# Patient Record
Sex: MALE | Race: WHITE | NOT HISPANIC OR LATINO | Employment: FULL TIME | URBAN - METROPOLITAN AREA
[De-identification: names, ages, dates, MRNs, and addresses within clinical notes are randomized per-mention and may not be internally consistent; named-entity substitution may affect disease eponyms.]

---

## 2024-04-22 ENCOUNTER — TRANSCRIBE ORDERS (OUTPATIENT)
Dept: ADMINISTRATIVE | Facility: HOSPITAL | Age: 54
End: 2024-04-22
Payer: COMMERCIAL

## 2024-04-22 DIAGNOSIS — E78.2 MIXED HYPERLIPIDEMIA: Primary | ICD-10-CM

## 2024-06-04 ENCOUNTER — HOSPITAL ENCOUNTER (OUTPATIENT)
Dept: CT IMAGING | Facility: HOSPITAL | Age: 54
Discharge: HOME | End: 2024-06-04

## 2024-06-04 DIAGNOSIS — E78.2 MIXED HYPERLIPIDEMIA: ICD-10-CM

## 2024-06-04 PROCEDURE — 75571 CT HRT W/O DYE W/CA TEST: CPT

## 2024-06-22 NOTE — PROGRESS NOTES
Cardiology Office New Patient Visit      Primary Care Provider:  Provider, No Known    Reason for Visit:     Eval Abnormal Coronary Calcium Scan      Subjective       History of Present Illness       Heladio Clark is a 54 y.o. male seen in cardiology clinic today for evaluation of abnormal coronary calcium scan.    Coronary calcium scan 2024 showed a total score 473.7: 281.7 LAD, 139.2 Lcx, and 52.8 RCA.  He is also noted with a 4.4 cm mid ascending thoracic aneurysm.  Recents labs 2024 as follows: Na 139, K 4.1, Cr 1.04, LFTs WNL, Hgb 14.4, Plt 296.  FLP: , , HDL 40, .    Patient tells me that coronary calcium scan testing was prompted by elevated lipids.  He denies any chest pain, shortness of breath, or dizziness.  He is active and has been able to perform activities such as walking the golf course recently.  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  Patient is unaware of any family history of aortic aneurysm or genetic conditions such as Marfan syndrome, Duke's, or Erler's Danlos.  His father did have an MI in his 40s with PCI and  of cancer at age 50.      ASSESSMENT/PLAN:        Diagnoses and all orders for this visit:    1. Coronary artery disease involving native coronary artery of native heart without angina pectoris (Primary)  -     Stress Test With Myocardial Perfusion One Day; Future  -     ECG 12 Lead    2. Aneurysm of ascending aorta without rupture  -     Adult Transthoracic Echo Complete W/ Cont if Necessary Per Protocol; Future    3. Heart murmur  -     Adult Transthoracic Echo Complete W/ Cont if Necessary Per Protocol; Future    4. Mixed hyperlipidemia    Other orders  -     atorvastatin (LIPITOR) 40 MG tablet; Take 1 tablet by mouth Daily.  Dispense: 30 tablet; Refill: 11        MEDICAL DECISION MAKING:    Records reviewed as above.  Patient's coronary calcium score is high, in which he has a high risk of  "cardiac events in the next 5 years.  He is currently asymptomatic and without ischemic EKG changes.  Will proceed with exercise nuclear stress test.  He also has an ascending aortic aneurysm with a murmur on exam.  Will check a 2D echo to further outline structural anatomy, specifically of the AV.  He has already been started on aspirin therapy.  Will change his Zocor to atorvastatin to achieve high-dose potency.      RTC in 3 months.    Past Medical History:   Diagnosis Date    Hyperlipidemia        Past Surgical History:   Procedure Laterality Date    TONSILLECTOMY           Current Outpatient Medications:     aspirin 81 MG EC tablet, Take 1 tablet by mouth Daily., Disp: , Rfl:     atorvastatin (LIPITOR) 40 MG tablet, Take 1 tablet by mouth Daily., Disp: 30 tablet, Rfl: 11    Social History     Socioeconomic History    Marital status: Single   Tobacco Use    Smoking status: Never    Smokeless tobacco: Never   Vaping Use    Vaping status: Never Used   Substance and Sexual Activity    Alcohol use: Never    Drug use: Never    Sexual activity: Defer     Never smoked.  No illicit drug use.  Occasional alcohol consumption.    Family History   Problem Relation Age of Onset    Heart disease Father      Patient is unaware of any family history of aortic aneurysm or genetic conditions such as Marfan syndrome, Duke's, or Erler's Danlos.  His father did have an MI in his 40s with PCI and  of cancer at age 50.    The following portions of the patient's history were reviewed and updated as appropriate: allergies, current medications, past family history, past medical history, past social history, past surgical history and problem list.    ROS    /79   Pulse 60   Ht 175.3 cm (69\")   Wt 105 kg (232 lb)   SpO2 95%   BMI 34.26 kg/m² .    Body mass index is 34.26 kg/m².    Objective     Physical Exam    General: Well-developed in NAD.  Neuro: AAOx3. No gross deficits.  HEENT: no xanthelasmas, no carotid bruit " bilaterally.  CV: S1S2 RRR. 2/6 systolic murmurs loudest in aortic area.  Resp: Breathing is unlabored. Lungs CTA throughout.  GI: BS+. Abdomen soft and NTTP.  Ext: Pedal pulses are palpable. Extremities are nonedematous.  MS: moves all extremities, no weakness.  Skin: warm, dry.  Psych: calm and cooperative.    Procedures    EKG shows normal sinus rhythm.  No prior EKGs are available for comparison.

## 2024-06-24 ENCOUNTER — OFFICE VISIT (OUTPATIENT)
Dept: CARDIOLOGY | Facility: CLINIC | Age: 54
End: 2024-06-24
Payer: COMMERCIAL

## 2024-06-24 VITALS
DIASTOLIC BLOOD PRESSURE: 79 MMHG | HEART RATE: 60 BPM | WEIGHT: 232 LBS | HEIGHT: 69 IN | OXYGEN SATURATION: 95 % | SYSTOLIC BLOOD PRESSURE: 132 MMHG | BODY MASS INDEX: 34.36 KG/M2

## 2024-06-24 DIAGNOSIS — I71.21 ANEURYSM OF ASCENDING AORTA WITHOUT RUPTURE: ICD-10-CM

## 2024-06-24 DIAGNOSIS — E78.2 MIXED HYPERLIPIDEMIA: ICD-10-CM

## 2024-06-24 DIAGNOSIS — I25.10 CORONARY ARTERY DISEASE INVOLVING NATIVE CORONARY ARTERY OF NATIVE HEART WITHOUT ANGINA PECTORIS: Primary | ICD-10-CM

## 2024-06-24 DIAGNOSIS — R01.1 HEART MURMUR: ICD-10-CM

## 2024-06-24 PROBLEM — E78.5 HLD (HYPERLIPIDEMIA): Status: ACTIVE | Noted: 2024-06-24

## 2024-06-24 PROCEDURE — 93000 ELECTROCARDIOGRAM COMPLETE: CPT | Performed by: NURSE PRACTITIONER

## 2024-06-24 PROCEDURE — 99214 OFFICE O/P EST MOD 30 MIN: CPT | Performed by: NURSE PRACTITIONER

## 2024-06-24 RX ORDER — ASPIRIN 81 MG/1
81 TABLET ORAL DAILY
COMMUNITY

## 2024-06-24 RX ORDER — SIMVASTATIN 40 MG
80 TABLET ORAL DAILY
COMMUNITY
Start: 2024-04-19 | End: 2024-06-24

## 2024-06-24 RX ORDER — ATORVASTATIN CALCIUM 40 MG/1
40 TABLET, FILM COATED ORAL DAILY
Qty: 30 TABLET | Refills: 11 | Status: SHIPPED | OUTPATIENT
Start: 2024-06-24

## 2024-07-11 ENCOUNTER — HOSPITAL ENCOUNTER (OUTPATIENT)
Dept: CARDIOLOGY | Facility: HOSPITAL | Age: 54
Discharge: HOME | End: 2024-07-11
Payer: COMMERCIAL

## 2024-07-11 ENCOUNTER — HOSPITAL ENCOUNTER (OUTPATIENT)
Dept: CARDIOLOGY | Facility: HOSPITAL | Age: 54
Discharge: HOME | End: 2024-07-11
Admitting: NURSE PRACTITIONER
Payer: COMMERCIAL

## 2024-07-11 VITALS
DIASTOLIC BLOOD PRESSURE: 79 MMHG | SYSTOLIC BLOOD PRESSURE: 132 MMHG | WEIGHT: 232 LBS | BODY MASS INDEX: 34.36 KG/M2 | HEIGHT: 69 IN

## 2024-07-11 DIAGNOSIS — R01.1 HEART MURMUR: ICD-10-CM

## 2024-07-11 DIAGNOSIS — I71.21 ANEURYSM OF ASCENDING AORTA WITHOUT RUPTURE: ICD-10-CM

## 2024-07-11 DIAGNOSIS — I25.10 CORONARY ARTERY DISEASE INVOLVING NATIVE CORONARY ARTERY OF NATIVE HEART WITHOUT ANGINA PECTORIS: ICD-10-CM

## 2024-07-11 LAB
BH CV ECHO LEFT VENTRICLE GLOBAL LONGITUDINAL STRAIN: 17.6 %
BH CV ECHO MEAS - ACS: 1.76 CM
BH CV ECHO MEAS - AI P1/2T: 849.6 MSEC
BH CV ECHO MEAS - AO MAX PG: 8.7 MMHG
BH CV ECHO MEAS - AO MEAN PG: 4.5 MMHG
BH CV ECHO MEAS - AO ROOT DIAM: 3.3 CM
BH CV ECHO MEAS - AO V2 MAX: 147.8 CM/SEC
BH CV ECHO MEAS - AO V2 VTI: 36.8 CM
BH CV ECHO MEAS - AVA(I,D): 2.07 CM2
BH CV ECHO MEAS - EDV(CUBED): 65 ML
BH CV ECHO MEAS - EDV(MOD-SP4): 95.5 ML
BH CV ECHO MEAS - EF(MOD-BP): 59 %
BH CV ECHO MEAS - EF(MOD-SP4): 59.3 %
BH CV ECHO MEAS - ESV(CUBED): 24.1 ML
BH CV ECHO MEAS - ESV(MOD-SP4): 38.8 ML
BH CV ECHO MEAS - FS: 28.2 %
BH CV ECHO MEAS - IVS/LVPW: 0.9 CM
BH CV ECHO MEAS - IVSD: 1.16 CM
BH CV ECHO MEAS - LA DIMENSION: 4.4 CM
BH CV ECHO MEAS - LV DIASTOLIC VOL/BSA (35-75): 43.4 CM2
BH CV ECHO MEAS - LV MASS(C)D: 170.6 GRAMS
BH CV ECHO MEAS - LV MAX PG: 3.8 MMHG
BH CV ECHO MEAS - LV MEAN PG: 2.5 MMHG
BH CV ECHO MEAS - LV SYSTOLIC VOL/BSA (12-30): 17.7 CM2
BH CV ECHO MEAS - LV V1 MAX: 97.7 CM/SEC
BH CV ECHO MEAS - LV V1 VTI: 24.8 CM
BH CV ECHO MEAS - LVIDD: 4 CM
BH CV ECHO MEAS - LVIDS: 2.9 CM
BH CV ECHO MEAS - LVOT AREA: 3.1 CM2
BH CV ECHO MEAS - LVOT DIAM: 1.97 CM
BH CV ECHO MEAS - LVPWD: 1.28 CM
BH CV ECHO MEAS - MR MAX PG: 23.3 MMHG
BH CV ECHO MEAS - MR MAX VEL: 241.5 CM/SEC
BH CV ECHO MEAS - MV A MAX VEL: 59.4 CM/SEC
BH CV ECHO MEAS - MV DEC SLOPE: 454.8 CM/SEC2
BH CV ECHO MEAS - MV DEC TIME: 0.19 SEC
BH CV ECHO MEAS - MV E MAX VEL: 88 CM/SEC
BH CV ECHO MEAS - MV E/A: 1.48
BH CV ECHO MEAS - MV MAX PG: 3.9 MMHG
BH CV ECHO MEAS - MV MEAN PG: 1.13 MMHG
BH CV ECHO MEAS - MV V2 VTI: 33.6 CM
BH CV ECHO MEAS - MVA(VTI): 2.26 CM2
BH CV ECHO MEAS - PA ACC TIME: 0.17 SEC
BH CV ECHO MEAS - PA V2 MAX: 93.7 CM/SEC
BH CV ECHO MEAS - PULM A REVS DUR: 0.15 SEC
BH CV ECHO MEAS - PULM A REVS VEL: 24.9 CM/SEC
BH CV ECHO MEAS - PULM DIAS VEL: 43 CM/SEC
BH CV ECHO MEAS - PULM S/D: 1.27
BH CV ECHO MEAS - PULM SYS VEL: 54.8 CM/SEC
BH CV ECHO MEAS - RAP SYSTOLE: 3 MMHG
BH CV ECHO MEAS - RV MAX PG: 2.42 MMHG
BH CV ECHO MEAS - RV V1 MAX: 77.8 CM/SEC
BH CV ECHO MEAS - RV V1 VTI: 17.1 CM
BH CV ECHO MEAS - RVDD: 3.2 CM
BH CV ECHO MEAS - RVSP: 19.3 MMHG
BH CV ECHO MEAS - SV(LVOT): 76.1 ML
BH CV ECHO MEAS - SV(MOD-SP4): 56.7 ML
BH CV ECHO MEAS - SVI(LVOT): 34.6 ML/M2
BH CV ECHO MEAS - SVI(MOD-SP4): 25.8 ML/M2
BH CV ECHO MEAS - TR MAX PG: 16.3 MMHG
BH CV ECHO MEAS - TR MAX VEL: 202 CM/SEC
BH CV REST NUCLEAR ISOTOPE DOSE: 11.7 MCI
BH CV STRESS BP STAGE 1: NORMAL
BH CV STRESS BP STAGE 2: NORMAL
BH CV STRESS DURATION MIN STAGE 1: 3
BH CV STRESS DURATION MIN STAGE 2: 3
BH CV STRESS DURATION MIN STAGE 3: 3
BH CV STRESS DURATION MIN STAGE 4: 0
BH CV STRESS DURATION SEC STAGE 1: 0
BH CV STRESS DURATION SEC STAGE 2: 0
BH CV STRESS DURATION SEC STAGE 3: 0
BH CV STRESS DURATION SEC STAGE 4: 21
BH CV STRESS GRADE STAGE 1: 10
BH CV STRESS GRADE STAGE 2: 12
BH CV STRESS GRADE STAGE 3: 14
BH CV STRESS GRADE STAGE 4: 16
BH CV STRESS HR STAGE 1: 92
BH CV STRESS HR STAGE 2: 111
BH CV STRESS HR STAGE 3: 136
BH CV STRESS HR STAGE 4: 142
BH CV STRESS METS STAGE 1: 5
BH CV STRESS METS STAGE 2: 7.5
BH CV STRESS METS STAGE 3: 10
BH CV STRESS METS STAGE 4: 13.5
BH CV STRESS NUCLEAR ISOTOPE DOSE: 36.1 MCI
BH CV STRESS PROTOCOL 1: NORMAL
BH CV STRESS RECOVERY BP: NORMAL MMHG
BH CV STRESS RECOVERY HR: 78 BPM
BH CV STRESS SPEED STAGE 1: 1.7
BH CV STRESS SPEED STAGE 2: 2.5
BH CV STRESS SPEED STAGE 3: 3.4
BH CV STRESS SPEED STAGE 4: 4.2
BH CV STRESS STAGE 1: 1
BH CV STRESS STAGE 2: 2
BH CV STRESS STAGE 3: 3
BH CV STRESS STAGE 4: 4
LV EF NUC BP: 59 %
MAXIMAL PREDICTED HEART RATE: 167 BPM
PERCENT MAX PREDICTED HR: 86.23 %
STRESS BASELINE BP: NORMAL MMHG
STRESS BASELINE HR: 50 BPM
STRESS PERCENT HR: 101 %
STRESS POST ESTIMATED WORKLOAD: 11.2 METS
STRESS POST EXERCISE DUR MIN: 9 MIN
STRESS POST EXERCISE DUR SEC: 21 SEC
STRESS POST PEAK BP: NORMAL MMHG
STRESS POST PEAK HR: 144 BPM
STRESS TARGET HR: 142 BPM

## 2024-07-11 PROCEDURE — 0 TECHNETIUM SESTAMIBI: Performed by: NURSE PRACTITIONER

## 2024-07-11 PROCEDURE — 93306 TTE W/DOPPLER COMPLETE: CPT

## 2024-07-11 PROCEDURE — 93356 MYOCRD STRAIN IMG SPCKL TRCK: CPT | Performed by: INTERNAL MEDICINE

## 2024-07-11 PROCEDURE — 93356 MYOCRD STRAIN IMG SPCKL TRCK: CPT

## 2024-07-11 PROCEDURE — 78452 HT MUSCLE IMAGE SPECT MULT: CPT

## 2024-07-11 PROCEDURE — 93306 TTE W/DOPPLER COMPLETE: CPT | Performed by: INTERNAL MEDICINE

## 2024-07-11 PROCEDURE — 93017 CV STRESS TEST TRACING ONLY: CPT

## 2024-07-11 PROCEDURE — A9500 TC99M SESTAMIBI: HCPCS | Performed by: NURSE PRACTITIONER

## 2024-07-11 RX ADMIN — TECHNETIUM TC 99M SESTAMIBI 1 DOSE: 1 INJECTION INTRAVENOUS at 08:26

## 2024-07-11 RX ADMIN — TECHNETIUM TC 99M SESTAMIBI 1 DOSE: 1 INJECTION INTRAVENOUS at 09:48

## 2024-07-20 ENCOUNTER — TELEPHONE (OUTPATIENT)
Dept: CARDIOLOGY | Facility: CLINIC | Age: 54
End: 2024-07-20
Payer: COMMERCIAL

## 2024-07-20 ENCOUNTER — PREP FOR SURGERY (OUTPATIENT)
Dept: OTHER | Facility: HOSPITAL | Age: 54
End: 2024-07-20
Payer: COMMERCIAL

## 2024-07-20 DIAGNOSIS — R94.39 ABNORMAL NUCLEAR STRESS TEST: Primary | ICD-10-CM

## 2024-07-20 RX ORDER — NITROGLYCERIN 0.4 MG/1
TABLET SUBLINGUAL
Qty: 25 TABLET | Refills: 2 | Status: SHIPPED | OUTPATIENT
Start: 2024-07-20

## 2024-07-20 NOTE — TELEPHONE ENCOUNTER
"Nuclear stress testing abnormal.  Discussed case with Dr. John.  Will proceed with left heart catheterization.  I have discussed with patient and he verbalizes understanding and agreeable to proceed.  PRN nitroglycerin prescribed and I have discussed the \"rule of 3\" of use with patient and when to go to the ER.  "

## 2024-07-22 PROBLEM — R94.39 ABNORMAL NUCLEAR STRESS TEST: Status: ACTIVE | Noted: 2024-07-20

## 2024-07-31 ENCOUNTER — HOSPITAL ENCOUNTER (OUTPATIENT)
Dept: CARDIOLOGY | Facility: HOSPITAL | Age: 54
Discharge: HOME | End: 2024-07-31
Payer: COMMERCIAL

## 2024-07-31 ENCOUNTER — LAB (OUTPATIENT)
Dept: LAB | Facility: HOSPITAL | Age: 54
End: 2024-07-31
Payer: COMMERCIAL

## 2024-07-31 DIAGNOSIS — R94.39 ABNORMAL NUCLEAR STRESS TEST: ICD-10-CM

## 2024-07-31 LAB
ANION GAP SERPL CALCULATED.3IONS-SCNC: 8.6 MMOL/L (ref 5–15)
APTT PPP: 30.8 SECONDS (ref 24–31)
BUN SERPL-MCNC: 12 MG/DL (ref 6–20)
BUN/CREAT SERPL: 12.5 (ref 7–25)
CALCIUM SPEC-SCNC: 9.2 MG/DL (ref 8.6–10.5)
CHLORIDE SERPL-SCNC: 101 MMOL/L (ref 98–107)
CO2 SERPL-SCNC: 27.4 MMOL/L (ref 22–29)
CREAT SERPL-MCNC: 0.96 MG/DL (ref 0.76–1.27)
DEPRECATED RDW RBC AUTO: 39.2 FL (ref 37–54)
EGFRCR SERPLBLD CKD-EPI 2021: 94.5 ML/MIN/1.73
ERYTHROCYTE [DISTWIDTH] IN BLOOD BY AUTOMATED COUNT: 12.5 % (ref 12.3–15.4)
GLUCOSE SERPL-MCNC: 98 MG/DL (ref 65–99)
HCT VFR BLD AUTO: 44.6 % (ref 37.5–51)
HGB BLD-MCNC: 14.7 G/DL (ref 13–17.7)
INR PPP: 1 (ref 0.93–1.1)
MCH RBC QN AUTO: 28.4 PG (ref 26.6–33)
MCHC RBC AUTO-ENTMCNC: 33 G/DL (ref 31.5–35.7)
MCV RBC AUTO: 86.1 FL (ref 79–97)
PLATELET # BLD AUTO: 300 10*3/MM3 (ref 140–450)
PMV BLD AUTO: 9.1 FL (ref 6–12)
POTASSIUM SERPL-SCNC: 4.2 MMOL/L (ref 3.5–5.2)
PROTHROMBIN TIME: 10.9 SECONDS (ref 9.6–11.7)
RBC # BLD AUTO: 5.18 10*6/MM3 (ref 4.14–5.8)
SODIUM SERPL-SCNC: 137 MMOL/L (ref 136–145)
WBC NRBC COR # BLD AUTO: 8 10*3/MM3 (ref 3.4–10.8)

## 2024-07-31 PROCEDURE — 93005 ELECTROCARDIOGRAM TRACING: CPT | Performed by: NURSE PRACTITIONER

## 2024-07-31 PROCEDURE — 36415 COLL VENOUS BLD VENIPUNCTURE: CPT

## 2024-07-31 PROCEDURE — 85610 PROTHROMBIN TIME: CPT

## 2024-07-31 PROCEDURE — 80048 BASIC METABOLIC PNL TOTAL CA: CPT

## 2024-07-31 PROCEDURE — 85027 COMPLETE CBC AUTOMATED: CPT

## 2024-07-31 PROCEDURE — 85730 THROMBOPLASTIN TIME PARTIAL: CPT

## 2024-08-01 LAB
QT INTERVAL: 421 MS
QTC INTERVAL: 413 MS

## 2024-08-02 ENCOUNTER — HOSPITAL ENCOUNTER (OUTPATIENT)
Facility: HOSPITAL | Age: 54
Discharge: HOME | End: 2024-08-02
Attending: INTERNAL MEDICINE | Admitting: INTERNAL MEDICINE
Payer: COMMERCIAL

## 2024-08-02 ENCOUNTER — PREP FOR SURGERY (OUTPATIENT)
Dept: OTHER | Facility: HOSPITAL | Age: 54
End: 2024-08-02
Payer: COMMERCIAL

## 2024-08-02 ENCOUNTER — APPOINTMENT (OUTPATIENT)
Dept: CARDIOLOGY | Facility: HOSPITAL | Age: 54
End: 2024-08-02
Payer: COMMERCIAL

## 2024-08-02 VITALS
RESPIRATION RATE: 16 BRPM | WEIGHT: 225.97 LBS | DIASTOLIC BLOOD PRESSURE: 77 MMHG | HEART RATE: 61 BPM | SYSTOLIC BLOOD PRESSURE: 140 MMHG | BODY MASS INDEX: 33.47 KG/M2 | OXYGEN SATURATION: 97 % | TEMPERATURE: 97.9 F | HEIGHT: 69 IN

## 2024-08-02 DIAGNOSIS — I71.21 ANEURYSM OF ASCENDING AORTA WITHOUT RUPTURE: ICD-10-CM

## 2024-08-02 DIAGNOSIS — I25.10 CORONARY ARTERY DISEASE INVOLVING NATIVE CORONARY ARTERY OF NATIVE HEART WITHOUT ANGINA PECTORIS: Primary | ICD-10-CM

## 2024-08-02 DIAGNOSIS — R94.39 ABNORMAL NUCLEAR STRESS TEST: ICD-10-CM

## 2024-08-02 LAB
BH CV XLRA MEAS - DIST GSV CALF DIST LEFT: 0.21 CM
BH CV XLRA MEAS - DIST GSV CALF DIST RIGHT: 0.3 CM
BH CV XLRA MEAS - DIST GSV THIGH DIST LEFT: 0.26 CM
BH CV XLRA MEAS - DIST GSV THIGH DIST RIGHT: 0.22 CM
BH CV XLRA MEAS - MID GSV CALF LEFT: 0.3 CM
BH CV XLRA MEAS - MID GSV CALF RIGHT: 0.29 CM
BH CV XLRA MEAS - MID GSV THIGH  LEFT: 0.42 CM
BH CV XLRA MEAS - MID GSV THIGH  RIGHT: 0.23 CM
BH CV XLRA MEAS - PROX GSV CALF DIST LEFT: 0.32 CM
BH CV XLRA MEAS - PROX GSV CALF DIST RIGHT: 0.34 CM
BH CV XLRA MEAS - PROX GSV THIGH  LEFT: 0.33 CM
BH CV XLRA MEAS - PROX GSV THIGH  RIGHT: 0.43 CM
BH CV XLRA MEAS LEFT CAROTID BULB PSV: -62.3 CM/SEC
BH CV XLRA MEAS LEFT DIST CCA EDV: 25.9 CM/SEC
BH CV XLRA MEAS LEFT DIST CCA PSV: 97.4 CM/SEC
BH CV XLRA MEAS LEFT DIST ICA EDV: -40.6 CM/SEC
BH CV XLRA MEAS LEFT DIST ICA PSV: -83.4 CM/SEC
BH CV XLRA MEAS LEFT ICA/CCA RATIO: -0.86
BH CV XLRA MEAS LEFT PROX CCA EDV: 17.3 CM/SEC
BH CV XLRA MEAS LEFT PROX CCA PSV: 92.7 CM/SEC
BH CV XLRA MEAS LEFT PROX ECA PSV: -79.2 CM/SEC
BH CV XLRA MEAS LEFT PROX ICA EDV: -24.5 CM/SEC
BH CV XLRA MEAS LEFT PROX ICA PSV: -68 CM/SEC
BH CV XLRA MEAS LEFT PROX SCLA PSV: 160 CM/SEC
BH CV XLRA MEAS LEFT VERTEBRAL A PSV: -46.6 CM/SEC
BH CV XLRA MEAS RIGHT CAROTID BULB PSV: -39.5 CM/SEC
BH CV XLRA MEAS RIGHT DIST CCA EDV: 16.2 CM/SEC
BH CV XLRA MEAS RIGHT DIST CCA PSV: 71.4 CM/SEC
BH CV XLRA MEAS RIGHT DIST ICA EDV: -22.5 CM/SEC
BH CV XLRA MEAS RIGHT DIST ICA PSV: -75.7 CM/SEC
BH CV XLRA MEAS RIGHT ICA/CCA RATIO: -1.02
BH CV XLRA MEAS RIGHT PROX CCA EDV: 19.3 CM/SEC
BH CV XLRA MEAS RIGHT PROX CCA PSV: 85.1 CM/SEC
BH CV XLRA MEAS RIGHT PROX ECA PSV: -97.5 CM/SEC
BH CV XLRA MEAS RIGHT PROX ICA EDV: -25.6 CM/SEC
BH CV XLRA MEAS RIGHT PROX ICA PSV: -86.5 CM/SEC
BH CV XLRA MEAS RIGHT PROX SCLA PSV: 145 CM/SEC
BH CV XLRA MEAS RIGHT VERTEBRAL A PSV: -40.8 CM/SEC

## 2024-08-02 PROCEDURE — 25010000002 LIDOCAINE 1 % SOLUTION: Performed by: INTERNAL MEDICINE

## 2024-08-02 PROCEDURE — C1894 INTRO/SHEATH, NON-LASER: HCPCS | Performed by: INTERNAL MEDICINE

## 2024-08-02 PROCEDURE — 99153 MOD SED SAME PHYS/QHP EA: CPT | Performed by: INTERNAL MEDICINE

## 2024-08-02 PROCEDURE — S0260 H&P FOR SURGERY: HCPCS | Performed by: INTERNAL MEDICINE

## 2024-08-02 PROCEDURE — C1769 GUIDE WIRE: HCPCS | Performed by: INTERNAL MEDICINE

## 2024-08-02 PROCEDURE — G0378 HOSPITAL OBSERVATION PER HR: HCPCS

## 2024-08-02 PROCEDURE — 25510000001 IOPAMIDOL PER 1 ML: Performed by: INTERNAL MEDICINE

## 2024-08-02 PROCEDURE — 25010000002 FENTANYL CITRATE (PF) 100 MCG/2ML SOLUTION: Performed by: INTERNAL MEDICINE

## 2024-08-02 PROCEDURE — 93880 EXTRACRANIAL BILAT STUDY: CPT

## 2024-08-02 PROCEDURE — 25010000002 NICARDIPINE 2.5 MG/ML SOLUTION: Performed by: INTERNAL MEDICINE

## 2024-08-02 PROCEDURE — 25010000002 NITROGLYCERIN 5 MG/ML SOLUTION: Performed by: INTERNAL MEDICINE

## 2024-08-02 PROCEDURE — 25810000003 SODIUM CHLORIDE 0.9 % SOLUTION: Performed by: INTERNAL MEDICINE

## 2024-08-02 PROCEDURE — 93458 L HRT ARTERY/VENTRICLE ANGIO: CPT | Performed by: INTERNAL MEDICINE

## 2024-08-02 PROCEDURE — 25010000002 MIDAZOLAM PER 1 MG: Performed by: INTERNAL MEDICINE

## 2024-08-02 PROCEDURE — 93880 EXTRACRANIAL BILAT STUDY: CPT | Performed by: SURGERY

## 2024-08-02 PROCEDURE — 93970 EXTREMITY STUDY: CPT | Performed by: SURGERY

## 2024-08-02 PROCEDURE — 93970 EXTREMITY STUDY: CPT

## 2024-08-02 PROCEDURE — 99152 MOD SED SAME PHYS/QHP 5/>YRS: CPT | Performed by: INTERNAL MEDICINE

## 2024-08-02 PROCEDURE — 25010000002 HEPARIN (PORCINE) PER 1000 UNITS: Performed by: INTERNAL MEDICINE

## 2024-08-02 RX ORDER — MIDAZOLAM HYDROCHLORIDE 1 MG/ML
INJECTION INTRAMUSCULAR; INTRAVENOUS
Status: DISCONTINUED | OUTPATIENT
Start: 2024-08-02 | End: 2024-08-02 | Stop reason: HOSPADM

## 2024-08-02 RX ORDER — HEPARIN SODIUM 1000 [USP'U]/ML
INJECTION, SOLUTION INTRAVENOUS; SUBCUTANEOUS
Status: DISCONTINUED | OUTPATIENT
Start: 2024-08-02 | End: 2024-08-02 | Stop reason: HOSPADM

## 2024-08-02 RX ORDER — ALPRAZOLAM 0.25 MG/1
0.25 TABLET ORAL ONCE
OUTPATIENT
Start: 2024-08-02 | End: 2024-08-02

## 2024-08-02 RX ORDER — CHLORHEXIDINE GLUCONATE ORAL RINSE 1.2 MG/ML
15 SOLUTION DENTAL ONCE
OUTPATIENT
Start: 2024-08-02 | End: 2024-08-02

## 2024-08-02 RX ORDER — ATORVASTATIN CALCIUM 40 MG/1
40 TABLET, FILM COATED ORAL DAILY
Status: DISCONTINUED | OUTPATIENT
Start: 2024-08-03 | End: 2024-08-02 | Stop reason: HOSPADM

## 2024-08-02 RX ORDER — IBUPROFEN 600 MG/1
1 TABLET ORAL
OUTPATIENT
Start: 2024-08-02

## 2024-08-02 RX ORDER — CHLORHEXIDINE GLUCONATE ORAL RINSE 1.2 MG/ML
15 SOLUTION DENTAL EVERY 12 HOURS
OUTPATIENT
Start: 2024-08-02 | End: 2024-08-03

## 2024-08-02 RX ORDER — ISOSORBIDE MONONITRATE 30 MG/1
30 TABLET, EXTENDED RELEASE ORAL EVERY MORNING
Qty: 30 TABLET | Refills: 0 | Status: ON HOLD | OUTPATIENT
Start: 2024-08-02

## 2024-08-02 RX ORDER — SODIUM CHLORIDE 9 MG/ML
30 INJECTION, SOLUTION INTRAVENOUS CONTINUOUS PRN
OUTPATIENT
Start: 2024-08-02

## 2024-08-02 RX ORDER — NICOTINE POLACRILEX 4 MG
15 LOZENGE BUCCAL
OUTPATIENT
Start: 2024-08-02

## 2024-08-02 RX ORDER — NITROGLYCERIN 0.4 MG/1
0.4 TABLET SUBLINGUAL
Status: DISCONTINUED | OUTPATIENT
Start: 2024-08-02 | End: 2024-08-02 | Stop reason: SDUPTHER

## 2024-08-02 RX ORDER — LIDOCAINE HYDROCHLORIDE 10 MG/ML
INJECTION, SOLUTION INFILTRATION; PERINEURAL
Status: DISCONTINUED | OUTPATIENT
Start: 2024-08-02 | End: 2024-08-02 | Stop reason: HOSPADM

## 2024-08-02 RX ORDER — NICARDIPINE HYDROCHLORIDE 2.5 MG/ML
INJECTION INTRAVENOUS
Status: DISCONTINUED | OUTPATIENT
Start: 2024-08-02 | End: 2024-08-02 | Stop reason: HOSPADM

## 2024-08-02 RX ORDER — SODIUM CHLORIDE 9 MG/ML
40 INJECTION, SOLUTION INTRAVENOUS AS NEEDED
OUTPATIENT
Start: 2024-08-02

## 2024-08-02 RX ORDER — SODIUM CHLORIDE 0.9 % (FLUSH) 0.9 %
3 SYRINGE (ML) INJECTION EVERY 12 HOURS SCHEDULED
OUTPATIENT
Start: 2024-08-02

## 2024-08-02 RX ORDER — SODIUM CHLORIDE 0.9 % (FLUSH) 0.9 %
3-10 SYRINGE (ML) INJECTION AS NEEDED
OUTPATIENT
Start: 2024-08-02

## 2024-08-02 RX ORDER — CHLORHEXIDINE GLUCONATE 500 MG/1
CLOTH TOPICAL EVERY 12 HOURS PRN
OUTPATIENT
Start: 2024-08-02

## 2024-08-02 RX ORDER — NITROGLYCERIN 5 MG/ML
INJECTION, SOLUTION INTRAVENOUS
Status: DISCONTINUED | OUTPATIENT
Start: 2024-08-02 | End: 2024-08-02 | Stop reason: HOSPADM

## 2024-08-02 RX ORDER — ACETAMINOPHEN 325 MG/1
650 TABLET ORAL EVERY 4 HOURS PRN
OUTPATIENT
Start: 2024-08-02

## 2024-08-02 RX ORDER — ACETAMINOPHEN 325 MG/1
650 TABLET ORAL EVERY 4 HOURS PRN
Status: DISCONTINUED | OUTPATIENT
Start: 2024-08-02 | End: 2024-08-02 | Stop reason: HOSPADM

## 2024-08-02 RX ORDER — SODIUM CHLORIDE 0.9 % (FLUSH) 0.9 %
30 SYRINGE (ML) INJECTION ONCE AS NEEDED
OUTPATIENT
Start: 2024-08-02

## 2024-08-02 RX ORDER — DEXTROSE MONOHYDRATE 25 G/50ML
10-50 INJECTION, SOLUTION INTRAVENOUS
OUTPATIENT
Start: 2024-08-02

## 2024-08-02 RX ORDER — ONDANSETRON 2 MG/ML
4 INJECTION INTRAMUSCULAR; INTRAVENOUS EVERY 6 HOURS PRN
Status: DISCONTINUED | OUTPATIENT
Start: 2024-08-02 | End: 2024-08-02 | Stop reason: HOSPADM

## 2024-08-02 RX ORDER — NITROGLYCERIN 0.4 MG/1
0.4 TABLET SUBLINGUAL
Status: DISCONTINUED | OUTPATIENT
Start: 2024-08-02 | End: 2024-08-02 | Stop reason: HOSPADM

## 2024-08-02 RX ORDER — ASPIRIN 81 MG/1
81 TABLET ORAL DAILY
Status: DISCONTINUED | OUTPATIENT
Start: 2024-08-03 | End: 2024-08-02 | Stop reason: HOSPADM

## 2024-08-02 RX ORDER — FENTANYL CITRATE 50 UG/ML
INJECTION, SOLUTION INTRAMUSCULAR; INTRAVENOUS
Status: DISCONTINUED | OUTPATIENT
Start: 2024-08-02 | End: 2024-08-02 | Stop reason: HOSPADM

## 2024-08-02 RX ORDER — NITROGLYCERIN 0.4 MG/1
0.4 TABLET SUBLINGUAL
OUTPATIENT
Start: 2024-08-02

## 2024-08-02 RX ORDER — SODIUM CHLORIDE 9 MG/ML
INJECTION, SOLUTION INTRAVENOUS
Status: COMPLETED | OUTPATIENT
Start: 2024-08-02 | End: 2024-08-02

## 2024-08-02 RX ORDER — ONDANSETRON 4 MG/1
4 TABLET, ORALLY DISINTEGRATING ORAL EVERY 6 HOURS PRN
Status: DISCONTINUED | OUTPATIENT
Start: 2024-08-02 | End: 2024-08-02 | Stop reason: HOSPADM

## 2024-08-02 NOTE — Clinical Note
Hemostasis started on the right radial artery. R-Band was used in achieving hemostasis. Radial compression device applied to vessel. Hemostasis achieved successfully. Closure device additional comment: Tr band 13 cc

## 2024-08-02 NOTE — DISCHARGE INSTRUCTIONS
Post Cath Instructions  Drink plenty of fluids for the next 24 hours.  This helps to eliminate the dye used in your procedure through urination.  You may resume a normal diet; however, try to avoid foods that would cause gas or constipation.    Sedative medication given to you during your catheterization may decrease your judgement and reaction time for up to 24-48 hours.  Therefore:  DO NOT drive or operate hazardous machinery for 24 hours.   DO NOT consume alcoholic beverages for 24 hours.  DO NOT make any important/legal decisions for 24 hours.   Have someone stay with you for at least 24 hours.    For the next 48 hours (to allow proper healing and prevent bleeding):  Avoid excessive bending at wound site  Avoid straining (anything that would tense up muscles around the affected puncture site)  Avoid lifting objects greater than 5 pounds, pushing, or pulling for 5 days  For Arm Cases:  No flexing at the puncture site, such as hammering, golfing, bowling, or swinging any objects    Keep the puncture site clean and dry.  After 24 hours, remove the dressing and replace it with a Band-Aid for at least one additional day.  Gently clean the site with mild soap and water.  No scrubbing/rubbing, and lightly pat the area dry.  Showers are acceptable; however, avoid submerging in water (tub baths, hot tubs, swimming pools, dishwater, etc…) for at least one week.  The site should be completely healed before resuming these activities to reduce the risk of infection.  Check the site often.  Watch for signs and symptoms of infection and notify your physician if any of the following occur:  Bleeding or an increase in swelling at the puncture site  Fever  Increased soreness around puncture site  Foul odor or significant drainage from the puncture site  Swelling, redness, or warmth at the puncture site  **A bruise or small “pea sized” lump under the skin at the puncture site is not unusual.  This should disappear within 3-4  weeks.**  CONTACT YOUR PHYSICIAN OR CALL 911 IF YOU EXPERIENCE ANY OF THE FOLLOWING:  Increased angina (chest pain) or frequent sensations of pressure, burning, pain, or other discomfort in the chest, arm, jaws, or stomach  Lightheadedness, dizziness, faint feeling, sweating, or difficulty breathing  Odd sensation changes like numbness, tingling, coldness, or pain in the arm or leg in which the catheter was inserted  Limb in which the catheter was inserted becomes pale/bluish in color    IMPORTANT:  Although this occurs very rarely, if you should develop bright red or excessive bleeding, feel a “pop” inside at the insertion site, or notice a sudden increase in swelling larger than a walnut, you should call 911.  Hold continuous firm pressure to the access site until emergency personnel arrive.  It is best if someone else can do this for you.

## 2024-08-02 NOTE — H&P
[facility] CARDIOLOGY    AubreejovanniDilcia foreman APRN    CHIEF COMPLAINT:     Eval Abnormal Coronary Calcium Scan     HISTORY OF PRESENT ILLNESS:    54 y.o. male seen in cardiology clinic today for evaluation of abnormal coronary calcium scan.     Coronary calcium scan 2024 showed a total score 473.7: 281.7 LAD, 139.2 Lcx, and 52.8 RCA.  He is also noted with a 4.4 cm mid ascending thoracic aneurysm.  Recents labs 2024 as follows: Na 139, K 4.1, Cr 1.04, LFTs WNL, Hgb 14.4, Plt 296.  FLP: , , HDL 40, .     Patient tells me that coronary calcium scan testing was prompted by elevated lipids.  He denies any chest pain, shortness of breath, or dizziness.  He is active and has been able to perform activities such as walking the golf course recently.  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  Patient is unaware of any family history of aortic aneurysm or genetic conditions such as Marfan syndrome, Duke's, or Erler's Danlos.  His father did have an MI in his 40s with PCI and  of cancer at age 50.        ASSESSMENT/PLAN:           Diagnoses and all orders for this visit:     1. Coronary artery disease involving native coronary artery of native heart without angina pectoris (Primary)  -     Stress Test With Myocardial Perfusion One Day; Future  -     ECG 12 Lead     2. Aneurysm of ascending aorta without rupture  -     Adult Transthoracic Echo Complete W/ Cont if Necessary Per Protocol; Future     3. Heart murmur  -     Adult Transthoracic Echo Complete W/ Cont if Necessary Per Protocol; Future     4. Mixed hyperlipidemia     Other orders  -     atorvastatin (LIPITOR) 40 MG tablet; Take 1 tablet by mouth Daily.  Dispense: 30 tablet; Refill: 11        Interpretation Summary         Exercise myocardial perfusion study with a hypertensive response and also patient noted to have a somewhat nondiagnostic EKG changes with upsloping ST  segment depression with exercise.  Myocardial perfusion study shows small apical perfusion defect at rest at peak stress after the exercise there is small to moderate size severe intensity perfusion defect with  small reversible ischemia.    Left ventricular ejection fraction is normal (Calculated EF = 59%).    Findings consistent with an indeterminate ECG stress test.    Impressions are consistent with an intermediate risk study.    Clinical correlation is recommended           Plan to proceed with cardiac catheterization for definite diagnosis and treatment options  Risk benefits and alternatives reviewed and discussed with patient            Past Medical History:   Diagnosis Date    Hyperlipidemia      Past Surgical History:   Procedure Laterality Date    TONSILLECTOMY       Family History   Problem Relation Age of Onset    Heart disease Father      Social History     Tobacco Use    Smoking status: Never    Smokeless tobacco: Never   Vaping Use    Vaping status: Never Used   Substance Use Topics    Alcohol use: Yes     Comment: socially    Drug use: Never     Medications Prior to Admission   Medication Sig Dispense Refill Last Dose    aspirin 81 MG EC tablet Take 1 tablet by mouth Daily.   8/2/2024    atorvastatin (LIPITOR) 40 MG tablet Take 1 tablet by mouth Daily. 30 tablet 11 8/2/2024    nitroglycerin (NITROSTAT) 0.4 MG SL tablet 1 under the tongue as needed for angina, may repeat q5mins for up three doses 25 tablet 2      Allergies:  Nuts and Penicillins      There is no immunization history on file for this patient.        REVIEW OF SYSTEMS:   Review of Systems   Constitutional: Negative for chills, decreased appetite and malaise/fatigue.   HENT:  Negative for congestion and nosebleeds.    Eyes:  Negative for blurred vision and double vision.   Cardiovascular:  Negative for chest pain, dyspnea on exertion, irregular heartbeat, leg swelling, near-syncope, orthopnea and palpitations.   Respiratory:  Negative  "for cough and shortness of breath.    Hematologic/Lymphatic: Negative for adenopathy. Does not bruise/bleed easily.   Skin:  Negative for color change and rash.   Musculoskeletal:  Negative for back pain and joint pain.   Gastrointestinal:  Negative for bloating, abdominal pain, hematemesis and hematochezia.   Genitourinary:  Negative for flank pain and hematuria.   Neurological:  Negative for dizziness and focal weakness.   Psychiatric/Behavioral:  Negative for altered mental status. The patient does not have insomnia.        Medication Review:  Scheduled Meds:  Continuous Infusions:No current facility-administered medications for this encounter.    PRN Meds:.    Vital Signs  Visit Vitals  /86 (BP Location: Right arm, Patient Position: Sitting)   Pulse 55   Temp 97.9 °F (36.6 °C) (Oral)   Resp 15   Ht 174.6 cm (68.75\")   Wt 102 kg (225 lb 15.5 oz)   SpO2 98%   BMI 33.61 kg/m²       Flowsheet Rows      Flowsheet Row First Filed Value   Admission Height 174.6 cm (68.75\") Documented at 08/02/2024 0730   Admission Weight 102 kg (225 lb 15.5 oz) Documented at 08/02/2024 0730             Physical Exam:  Constitutional:       Appearance: Well-developed.   Eyes:      Conjunctiva/sclera: Conjunctivae normal.      Pupils: Pupils are equal, round, and reactive to light.   HENT:      Head: Normocephalic and atraumatic.   Neck:      Thyroid: No thyromegaly.   Pulmonary:      Effort: Pulmonary effort is normal.      Breath sounds: Normal breath sounds.   Cardiovascular:      Normal rate. Regular rhythm.   Pulses:     Intact distal pulses.   Edema:     Peripheral edema absent.   Abdominal:      General: Bowel sounds are normal.      Palpations: Abdomen is soft.   Musculoskeletal:      Cervical back: Normal range of motion and neck supple. Skin:     General: Skin is warm.   Neurological:      Mental Status: Alert and oriented to person, place, and time.         Results Review:    I reviewed the patient's new clinical " results.  Lab Results (most recent)       None            Imaging Results (Most Recent)       None          reviewed    ECG/EMG Results (most recent)       None          reviewed      Assessment & Plan       Abnormal nuclear stress test          Wilmer John MD  08/02/24  07:52 EDT

## 2024-08-02 NOTE — CONSULTS
Patient Care Team:  Dilcia Spaulding APRN as PCP - General (Family Medicine)  Referring Provider:  Dr. John  Reason for consultation:  MV CAD    Chief complaint:  dyspnea    Subjective     History of Present Illness:  55 y/o gentleman was seen by Dr. John after an abnormal coronary calcium score following elevated lipid panel.  His total score was 473 with LAD distrubution of 139.  He has a family hx of premature CAD with his father having an MI/ CABG in his 40s.  Additional findings on the coronary calcium score was ascending aortic ectasia at 4.5 cm.  He denies any family hx of aneurysms/ dissections or genetic connective tissue disorders.  He is active.  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  He is a .  He underwent stress testing with small to moderate perfusion defect.  He presented today for an elective heart cath and was found to have MV CAD with normal EF.  Dr. Domingo was asked to evaluate pt for surgical revascularization.    Review of Systems   Constitutional:  Positive for fatigue.   Respiratory:  Positive for shortness of breath.    Cardiovascular:  Negative for chest pain, palpitations and leg swelling.   Neurological:  Negative for dizziness and light-headedness.        Past Medical History:   Diagnosis Date    Hyperlipidemia      Past Surgical History:   Procedure Laterality Date    TONSILLECTOMY       Family History   Problem Relation Age of Onset    Heart disease Father      Social History     Tobacco Use    Smoking status: Never    Smokeless tobacco: Never   Vaping Use    Vaping status: Never Used   Substance Use Topics    Alcohol use: Yes     Comment: socially    Drug use: Never     Medications Prior to Admission   Medication Sig Dispense Refill Last Dose    aspirin 81 MG EC tablet Take 1 tablet by mouth Daily.   8/2/2024    atorvastatin (LIPITOR) 40 MG tablet Take 1 tablet by mouth Daily. 30 tablet 11 8/2/2024     "nitroglycerin (NITROSTAT) 0.4 MG SL tablet 1 under the tongue as needed for angina, may repeat q5mins for up three doses 25 tablet 2        Allergies:  Nuts and Penicillins    Objective      Vital Signs  Temp:  [97.9 °F (36.6 °C)] 97.9 °F (36.6 °C)  Heart Rate:  [54-57] 55  Resp:  [10-20] 10  BP: (124-146)/(73-86) 124/73    Flowsheet Rows      Flowsheet Row First Filed Value   Admission Height 174.6 cm (68.75\") Documented at 08/02/2024 0730   Admission Weight 102 kg (225 lb 15.5 oz) Documented at 08/02/2024 0730          174.6 cm (68.75\")    Physical Exam  Vitals and nursing note reviewed.   Constitutional:       General: He is awake.      Appearance: Normal appearance. He is well-developed and well-groomed.      Comments: Seen in [facility] with mom and brother present   HENT:      Head: Normocephalic and atraumatic.      Nose: Nose normal.      Mouth/Throat:      Lips: Pink.      Mouth: Mucous membranes are moist.      Pharynx: Uvula midline.   Eyes:      General: Lids are normal. No scleral icterus.     Extraocular Movements: Extraocular movements intact.      Conjunctiva/sclera: Conjunctivae normal.      Pupils: Pupils are equal, round, and reactive to light.   Neck:      Thyroid: No thyroid mass or thyromegaly.      Vascular: Normal carotid pulses. No carotid bruit, hepatojugular reflux or JVD.      Trachea: Trachea normal.   Cardiovascular:      Rate and Rhythm: Regular rhythm. Bradycardia present.      Pulses:           Carotid pulses are 2+ on the right side and 2+ on the left side.       Radial pulses are 2+ on the right side and 2+ on the left side.        Femoral pulses are 2+ on the right side and 2+ on the left side.       Popliteal pulses are 2+ on the right side and 2+ on the left side.        Dorsalis pedis pulses are 2+ on the right side and 2+ on the left side.        Posterior tibial pulses are 2+ on the right side and 2+ on the left side.      Heart sounds: Normal heart sounds. No murmur heard.     " Comments: Tele:  SB 50s  Right radial cath site with pressure band  Pulmonary:      Effort: Pulmonary effort is normal.      Breath sounds: Normal breath sounds.   Chest:      Comments: 98% 2L  Abdominal:      General: Abdomen is protuberant. Bowel sounds are normal. There is no distension.      Palpations: Abdomen is soft.      Tenderness: There is no abdominal tenderness.   Musculoskeletal:      Cervical back: Neck supple.      Comments: Gait steady and strong without use of assistive devices   Lymphadenopathy:      Cervical: No cervical adenopathy.      Upper Body:      Right upper body: No supraclavicular adenopathy.      Left upper body: No supraclavicular adenopathy.   Skin:     General: Skin is warm and dry.      Capillary Refill: Capillary refill takes less than 2 seconds.      Findings: No erythema or rash.      Nails: There is no clubbing.   Neurological:      Mental Status: He is alert and oriented to person, place, and time.      GCS: GCS eye subscore is 4. GCS verbal subscore is 5. GCS motor subscore is 6.   Psychiatric:         Attention and Perception: Attention and perception normal.         Mood and Affect: Mood and affect normal.         Speech: Speech normal.         Behavior: Behavior normal. Behavior is cooperative.         Thought Content: Thought content normal.         Cognition and Memory: Cognition and memory normal.         Judgment: Judgment normal.       Results Review:   Lab Results (last 24 hours)       ** No results found for the last 24 hours. **            Cardiac cath per Dr. John 8/2/2024  Findings:  Hemodynamics Central artery pressure systolic 104 diastolic 72 with a mean pressure of 86 mmHg  LV end-diastolic pressure of 19 mmHg  There was no gradient across the aortic valve on the pullback of the pigtail catheter   Left Main Large-caliber vessel angiographically bifurcates into left anterior descending and left circumflex arteries   RCA Nondominant small caliber  vessel  Right coronary artery is a nondominant vessel provides only RV marginal branches  Right coronary artery has a mid long segment angiographic 90% stenosis   LAD Large-caliber vessel  LAD has proximal angiographic 20 to 30% stenosis  Mid LAD after the second diagonal branch has angiographic area of calcified 99% stenosis and this lesion involves the ostium of the second diagonal branch.  Mid LAD right after the lesion has a long segment intramyocardial bridging  First diagonal branch of the LAD is a moderate caliber vessel has a mid focal area of angiographic 80% stenosis  Second diagonal branch of the LAD is a moderate caliber vessel with ostial angiographic 70% stenosis and mid long segment angiographic 70% stenosis   Circ Large-caliber vessel dominant vessel  Left circumflex artery has a mid focal area of angiographic 30% stenosis  First marginal branch of left circumflex artery is a moderate caliber vessel has ostial angiographic 30 to 40% stenosis  Second marginal branch of left circumflex artery is a large-caliber vessel has a long segment proximal mid angiographic 90% stenosis  Third marginal branch of the left circumflex artery is a left PDA system angiographically normal  Fourth marginal branch of left circumflex artery works as left PLB system angiographically normal    LV Normal LV systolic function normal wall motion estimate LV ejection fraction of 60%   Coronary Dominance Left circumflex artery        Impressions:  Severe multivessel coronary artery disease  Severe stenosis involving the mid LAD after the second diagonal branch involving the ostium of the diagonal branch  Severe stenosis involving a moderate-sized first diagonal branch and moderate-sized second diagonal branch  Severe stenosis involving the marginal branch of left circumflex artery  Severe stenosis involving the nondominant right coronary artery  Normal LV systolic function normal wall motion     Recommendations:  Options for PCI  versus surgical revascularization reviewed and discussed with patient and family    Transthoracic echo:  Adult Transthoracic Echo Complete W/ Cont if Necessary Per Protocol  Interpretation Summary    Left ventricular systolic function is normal. Calculated left ventricular EF = 59% Left ventricular ejection fraction appears to be 56 - 60%.    Left ventricular wall thickness is consistent with mild concentric hypertrophy.    Left ventricular diastolic function was normal.    Estimated right ventricular systolic pressure from tricuspid regurgitation is normal (<35 mmHg).    Moderate dilation of the ascending aorta is present.    Ascending aortic aneurysm measuring 4.1 cm    Stress Test With Myocardial Perfusion One Day  Interpretation Summary    Exercise myocardial perfusion study with a hypertensive response and also patient noted to have a somewhat nondiagnostic EKG changes with upsloping ST segment depression with exercise.  Myocardial perfusion study shows small apical perfusion defect at rest at peak stress after the exercise there is small to moderate size severe intensity perfusion defect with  small reversible ischemia.    Left ventricular ejection fraction is normal (Calculated EF = 59%).    Findings consistent with an indeterminate ECG stress test.    Impressions are consistent with an intermediate risk study.    Clinical correlation is recommended         Assessment & Plan       Abnormal nuclear stress test    CAD (coronary artery disease)      Assessment & Plan    - MV CAD, EF 60% (cath)--surgical evaluation in progress  - Ascending aortic aneurysm, 4.5 x 4.4 cm with mild AI--per echo  - Mixed HLD--statin  - GUANAKITO--does not have CPAP yet  - Family hx of premature CAD--father MI in his 40s/ CABG    Plans for CABG/ ascending aortic aneurysm repair.  D/w pt/family.  D/w Dr. John.  Check carotid duplex/ vein mapping before dc.  Plans for DORON on Monday and surgery on Thursday, 8/8.    Thank you for allowing  us to participate in the care of this patient.      Fadia Lance, [place]  08/02/24  10:24 EDT    **all problems new to this examiner  **EKG and CXR independently reviewed and interpreted

## 2024-08-05 ENCOUNTER — HOSPITAL ENCOUNTER (OUTPATIENT)
Dept: CARDIOLOGY | Facility: HOSPITAL | Age: 54
Discharge: HOME | End: 2024-08-05
Payer: COMMERCIAL

## 2024-08-05 ENCOUNTER — APPOINTMENT (OUTPATIENT)
Dept: RESPIRATORY THERAPY | Facility: HOSPITAL | Age: 54
End: 2024-08-05
Payer: COMMERCIAL

## 2024-08-05 ENCOUNTER — PRE-ADMISSION TESTING (OUTPATIENT)
Dept: PREADMISSION TESTING | Facility: HOSPITAL | Age: 54
End: 2024-08-05
Payer: COMMERCIAL

## 2024-08-05 ENCOUNTER — HOSPITAL ENCOUNTER (OUTPATIENT)
Dept: CT IMAGING | Facility: HOSPITAL | Age: 54
Discharge: HOME | End: 2024-08-05
Payer: COMMERCIAL

## 2024-08-05 ENCOUNTER — HOSPITAL ENCOUNTER (OUTPATIENT)
Dept: GENERAL RADIOLOGY | Facility: HOSPITAL | Age: 54
Discharge: HOME | End: 2024-08-05
Payer: COMMERCIAL

## 2024-08-05 ENCOUNTER — LAB (OUTPATIENT)
Dept: LAB | Facility: HOSPITAL | Age: 54
End: 2024-08-05
Payer: COMMERCIAL

## 2024-08-05 VITALS
SYSTOLIC BLOOD PRESSURE: 124 MMHG | HEIGHT: 69 IN | RESPIRATION RATE: 8 BRPM | WEIGHT: 228.6 LBS | BODY MASS INDEX: 33.86 KG/M2 | HEART RATE: 52 BPM | DIASTOLIC BLOOD PRESSURE: 73 MMHG | TEMPERATURE: 97.5 F | OXYGEN SATURATION: 96 %

## 2024-08-05 DIAGNOSIS — I25.10 CORONARY ARTERY DISEASE INVOLVING NATIVE CORONARY ARTERY OF NATIVE HEART WITHOUT ANGINA PECTORIS: ICD-10-CM

## 2024-08-05 DIAGNOSIS — I71.21 ANEURYSM OF ASCENDING AORTA WITHOUT RUPTURE: ICD-10-CM

## 2024-08-05 LAB
ABO GROUP BLD: NORMAL
ALBUMIN SERPL-MCNC: 3.9 G/DL (ref 3.5–5.2)
ALBUMIN/GLOB SERPL: 1.4 G/DL
ALP SERPL-CCNC: 92 U/L (ref 39–117)
ALT SERPL W P-5'-P-CCNC: 22 U/L (ref 1–41)
ANION GAP SERPL CALCULATED.3IONS-SCNC: 10 MMOL/L (ref 5–15)
APTT PPP: 32.4 SECONDS (ref 24–31)
ARTERIAL PATENCY WRIST A: POSITIVE
AST SERPL-CCNC: 16 U/L (ref 1–40)
ATMOSPHERIC PRESS: ABNORMAL MM[HG]
BACTERIA UR QL AUTO: ABNORMAL /HPF
BASE EXCESS BLDA CALC-SCNC: 1.8 MMOL/L (ref 0–3)
BASOPHILS # BLD AUTO: 0.05 10*3/MM3 (ref 0–0.2)
BASOPHILS NFR BLD AUTO: 0.5 % (ref 0–1.5)
BDY SITE: ABNORMAL
BILIRUB SERPL-MCNC: 0.4 MG/DL (ref 0–1.2)
BILIRUB UR QL STRIP: NEGATIVE
BLD GP AB SCN SERPL QL: NEGATIVE
BUN SERPL-MCNC: 10 MG/DL (ref 6–20)
BUN/CREAT SERPL: 11.1 (ref 7–25)
CALCIUM SPEC-SCNC: 8.9 MG/DL (ref 8.6–10.5)
CHLORIDE SERPL-SCNC: 106 MMOL/L (ref 98–107)
CHOLEST SERPL-MCNC: 152 MG/DL (ref 0–200)
CLARITY UR: CLEAR
CLOSE TME COLL+ADP + EPINEP PNL BLD: 96 % (ref 86–100)
CO2 BLDA-SCNC: 27.4 MMOL/L (ref 22–29)
CO2 SERPL-SCNC: 24 MMOL/L (ref 22–29)
COLOR UR: ABNORMAL
CREAT BLDA-MCNC: 1 MG/DL (ref 0.6–1.3)
CREAT SERPL-MCNC: 0.9 MG/DL (ref 0.76–1.27)
DEPRECATED RDW RBC AUTO: 42.5 FL (ref 37–54)
EGFRCR SERPLBLD CKD-EPI 2021: 102.1 ML/MIN/1.73
EGFRCR SERPLBLD CKD-EPI 2021: 90 ML/MIN/1.73
EOSINOPHIL # BLD AUTO: 0.39 10*3/MM3 (ref 0–0.4)
EOSINOPHIL NFR BLD AUTO: 3.9 % (ref 0.3–6.2)
ERYTHROCYTE [DISTWIDTH] IN BLOOD BY AUTOMATED COUNT: 13 % (ref 12.3–15.4)
GLOBULIN UR ELPH-MCNC: 2.7 GM/DL
GLUCOSE SERPL-MCNC: 124 MG/DL (ref 65–99)
GLUCOSE UR STRIP-MCNC: NEGATIVE MG/DL
HBA1C MFR BLD: 6.23 % (ref 4.8–5.6)
HCO3 BLDA-SCNC: 26.2 MMOL/L (ref 21–28)
HCT VFR BLD AUTO: 41.6 % (ref 37.5–51)
HDLC SERPL-MCNC: 35 MG/DL (ref 40–60)
HEMODILUTION: NO
HGB BLD-MCNC: 13.4 G/DL (ref 13–17.7)
HGB UR QL STRIP.AUTO: NEGATIVE
HYALINE CASTS UR QL AUTO: ABNORMAL /LPF
IMM GRANULOCYTES # BLD AUTO: 0.03 10*3/MM3 (ref 0–0.05)
IMM GRANULOCYTES NFR BLD AUTO: 0.3 % (ref 0–0.5)
INHALED O2 CONCENTRATION: 21 %
INR PPP: 0.98 (ref 0.93–1.1)
KETONES UR QL STRIP: NEGATIVE
LDLC SERPL CALC-MCNC: 89 MG/DL (ref 0–100)
LDLC/HDLC SERPL: 2.43 {RATIO}
LEUKOCYTE ESTERASE UR QL STRIP.AUTO: ABNORMAL
LYMPHOCYTES # BLD AUTO: 1.82 10*3/MM3 (ref 0.7–3.1)
LYMPHOCYTES NFR BLD AUTO: 18 % (ref 19.6–45.3)
MCH RBC QN AUTO: 28.5 PG (ref 26.6–33)
MCHC RBC AUTO-ENTMCNC: 32.2 G/DL (ref 31.5–35.7)
MCV RBC AUTO: 88.3 FL (ref 79–97)
MODALITY: ABNORMAL
MONOCYTES # BLD AUTO: 0.74 10*3/MM3 (ref 0.1–0.9)
MONOCYTES NFR BLD AUTO: 7.3 % (ref 5–12)
MRSA DNA SPEC QL NAA+PROBE: NORMAL
NEUTROPHILS NFR BLD AUTO: 7.08 10*3/MM3 (ref 1.7–7)
NEUTROPHILS NFR BLD AUTO: 70 % (ref 42.7–76)
NITRITE UR QL STRIP: NEGATIVE
NRBC BLD AUTO-RTO: 0 /100 WBC (ref 0–0.2)
PCO2 BLDA: 39.4 MM HG (ref 35–48)
PH BLDA: 7.43 PH UNITS (ref 7.35–7.45)
PH UR STRIP.AUTO: 5.5 [PH] (ref 5–8)
PLATELET # BLD AUTO: 268 10*3/MM3 (ref 140–450)
PMV BLD AUTO: 9 FL (ref 6–12)
PO2 BLD: 348 MM[HG] (ref 0–500)
PO2 BLDA: 73.1 MM HG (ref 83–108)
POTASSIUM SERPL-SCNC: 3.7 MMOL/L (ref 3.5–5.2)
PROT SERPL-MCNC: 6.6 G/DL (ref 6–8.5)
PROT UR QL STRIP: NEGATIVE
PROTHROMBIN TIME: 10.7 SECONDS (ref 9.6–11.7)
RBC # BLD AUTO: 4.71 10*6/MM3 (ref 4.14–5.8)
RBC # UR STRIP: ABNORMAL /HPF
REF LAB TEST METHOD: ABNORMAL
RH BLD: POSITIVE
SAO2 % BLDCOA: 94.9 % (ref 94–98)
SARS-COV-2 RNA RESP QL NAA+PROBE: NOT DETECTED
SODIUM SERPL-SCNC: 140 MMOL/L (ref 136–145)
SP GR UR STRIP: 1.03 (ref 1–1.03)
SQUAMOUS #/AREA URNS HPF: ABNORMAL /HPF
T&S EXPIRATION DATE: NORMAL
TRIGL SERPL-MCNC: 160 MG/DL (ref 0–150)
UROBILINOGEN UR QL STRIP: ABNORMAL
VLDLC SERPL-MCNC: 28 MG/DL (ref 5–40)
WBC # UR STRIP: ABNORMAL /HPF
WBC NRBC COR # BLD AUTO: 10.11 10*3/MM3 (ref 3.4–10.8)

## 2024-08-05 PROCEDURE — 86900 BLOOD TYPING SEROLOGIC ABO: CPT

## 2024-08-05 PROCEDURE — 85610 PROTHROMBIN TIME: CPT

## 2024-08-05 PROCEDURE — 36415 COLL VENOUS BLD VENIPUNCTURE: CPT

## 2024-08-05 PROCEDURE — 36600 WITHDRAWAL OF ARTERIAL BLOOD: CPT

## 2024-08-05 PROCEDURE — 86923 COMPATIBILITY TEST ELECTRIC: CPT

## 2024-08-05 PROCEDURE — 86901 BLOOD TYPING SEROLOGIC RH(D): CPT

## 2024-08-05 PROCEDURE — 85576 BLOOD PLATELET AGGREGATION: CPT

## 2024-08-05 PROCEDURE — 83036 HEMOGLOBIN GLYCOSYLATED A1C: CPT

## 2024-08-05 PROCEDURE — 86850 RBC ANTIBODY SCREEN: CPT

## 2024-08-05 PROCEDURE — 82803 BLOOD GASES ANY COMBINATION: CPT

## 2024-08-05 PROCEDURE — 82565 ASSAY OF CREATININE: CPT

## 2024-08-05 PROCEDURE — 85730 THROMBOPLASTIN TIME PARTIAL: CPT

## 2024-08-05 PROCEDURE — 25510000001 IOPAMIDOL PER 1 ML: Performed by: NURSE PRACTITIONER

## 2024-08-05 PROCEDURE — 80053 COMPREHEN METABOLIC PANEL: CPT

## 2024-08-05 PROCEDURE — 87635 SARS-COV-2 COVID-19 AMP PRB: CPT

## 2024-08-05 PROCEDURE — 85025 COMPLETE CBC W/AUTO DIFF WBC: CPT

## 2024-08-05 PROCEDURE — 81001 URINALYSIS AUTO W/SCOPE: CPT

## 2024-08-05 PROCEDURE — 80061 LIPID PANEL: CPT

## 2024-08-05 PROCEDURE — 87641 MR-STAPH DNA AMP PROBE: CPT

## 2024-08-05 PROCEDURE — 71275 CT ANGIOGRAPHY CHEST: CPT

## 2024-08-05 PROCEDURE — 71046 X-RAY EXAM CHEST 2 VIEWS: CPT

## 2024-08-05 RX ADMIN — IOPAMIDOL 100 ML: 755 INJECTION, SOLUTION INTRAVENOUS at 09:19

## 2024-08-05 NOTE — H&P (VIEW-ONLY)
Patient Care Team:  Dilcia Spaulding APRN as PCP - General (Family Medicine)  Referring Provider:  Dr. John  Reason for consultation:  MV CAD    Chief complaint:  dyspnea    Subjective     History of Present Illness:  53 y/o gentleman was seen by Dr. John after an abnormal coronary calcium score following elevated lipid panel.  His total score was 473 with LAD distrubution of 139.  He has a family hx of premature CAD with his father having an MI/ CABG in his 40s.  Additional findings on the coronary calcium score was ascending aortic ectasia at 4.5 cm.  He denies any family hx of aneurysms/ dissections or genetic connective tissue disorders.  He is active.  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  He is a .  He underwent stress testing with small to moderate perfusion defect.  He presented today for an elective heart cath and was found to have MV CAD with normal EF.  Dr. Domingo was asked to evaluate pt for surgical revascularization.    Pt presents today for DORON.    Review of Systems   Constitutional:  Positive for fatigue.   Respiratory:  Positive for shortness of breath.    Cardiovascular:  Negative for chest pain, palpitations and leg swelling.   Neurological:  Negative for dizziness and light-headedness.        Past Medical History:   Diagnosis Date    Hyperlipidemia      Past Surgical History:   Procedure Laterality Date    TONSILLECTOMY       Family History   Problem Relation Age of Onset    Heart disease Father      Social History     Tobacco Use    Smoking status: Never    Smokeless tobacco: Never   Vaping Use    Vaping status: Never Used   Substance Use Topics    Alcohol use: Yes     Comment: socially    Drug use: Never     No medications prior to admission.       Allergies:  Nuts and Penicillins    Objective      Vital Signs  Temp:  [97.5 °F (36.4 °C)] 97.5 °F (36.4 °C)  Heart Rate:  [52] 52  Resp:  [8] 8  BP: (124)/(73)  "124/73    Flowsheet Rows      Flowsheet Row First Filed Value   Admission Height 174.6 cm (68.75\") Documented at 08/02/2024 0730   Admission Weight 102 kg (225 lb 15.5 oz) Documented at 08/02/2024 0730          174.6 cm (68.75\")    Physical Exam  Vitals and nursing note reviewed.   Constitutional:       General: He is awake.      Appearance: Normal appearance. He is well-developed and well-groomed.      Comments: Seen in [facility] with mom and brother present   HENT:      Head: Normocephalic and atraumatic.      Nose: Nose normal.      Mouth/Throat:      Lips: Pink.      Mouth: Mucous membranes are moist.      Pharynx: Uvula midline.   Eyes:      General: Lids are normal. No scleral icterus.     Extraocular Movements: Extraocular movements intact.      Conjunctiva/sclera: Conjunctivae normal.      Pupils: Pupils are equal, round, and reactive to light.   Neck:      Thyroid: No thyroid mass or thyromegaly.      Vascular: Normal carotid pulses. No carotid bruit, hepatojugular reflux or JVD.      Trachea: Trachea normal.   Cardiovascular:      Rate and Rhythm: Regular rhythm. Bradycardia present.      Pulses:           Carotid pulses are 2+ on the right side and 2+ on the left side.       Radial pulses are 2+ on the right side and 2+ on the left side.        Femoral pulses are 2+ on the right side and 2+ on the left side.       Popliteal pulses are 2+ on the right side and 2+ on the left side.        Dorsalis pedis pulses are 2+ on the right side and 2+ on the left side.        Posterior tibial pulses are 2+ on the right side and 2+ on the left side.      Heart sounds: Normal heart sounds. No murmur heard.     Comments: Tele:  SB 50s  Right radial cath site with pressure band  Pulmonary:      Effort: Pulmonary effort is normal.      Breath sounds: Normal breath sounds.   Chest:      Comments: 98% 2L  Abdominal:      General: Abdomen is protuberant. Bowel sounds are normal. There is no distension.      Palpations: Abdomen is " soft.      Tenderness: There is no abdominal tenderness.   Musculoskeletal:      Cervical back: Neck supple.      Comments: Gait steady and strong without use of assistive devices   Lymphadenopathy:      Cervical: No cervical adenopathy.      Upper Body:      Right upper body: No supraclavicular adenopathy.      Left upper body: No supraclavicular adenopathy.   Skin:     General: Skin is warm and dry.      Capillary Refill: Capillary refill takes less than 2 seconds.      Findings: No erythema or rash.      Nails: There is no clubbing.   Neurological:      Mental Status: He is alert and oriented to person, place, and time.      GCS: GCS eye subscore is 4. GCS verbal subscore is 5. GCS motor subscore is 6.   Psychiatric:         Attention and Perception: Attention and perception normal.         Mood and Affect: Mood and affect normal.         Speech: Speech normal.         Behavior: Behavior normal. Behavior is cooperative.         Thought Content: Thought content normal.         Cognition and Memory: Cognition and memory normal.         Judgment: Judgment normal.         Results Review:   Lab Results (last 24 hours)       ** No results found for the last 24 hours. **            Cardiac cath per Dr. John 8/2/2024  Findings:  Hemodynamics Central artery pressure systolic 104 diastolic 72 with a mean pressure of 86 mmHg  LV end-diastolic pressure of 19 mmHg  There was no gradient across the aortic valve on the pullback of the pigtail catheter   Left Main Large-caliber vessel angiographically bifurcates into left anterior descending and left circumflex arteries   RCA Nondominant small caliber vessel  Right coronary artery is a nondominant vessel provides only RV marginal branches  Right coronary artery has a mid long segment angiographic 90% stenosis   LAD Large-caliber vessel  LAD has proximal angiographic 20 to 30% stenosis  Mid LAD after the second diagonal branch has angiographic area of calcified 99% stenosis  and this lesion involves the ostium of the second diagonal branch.  Mid LAD right after the lesion has a long segment intramyocardial bridging  First diagonal branch of the LAD is a moderate caliber vessel has a mid focal area of angiographic 80% stenosis  Second diagonal branch of the LAD is a moderate caliber vessel with ostial angiographic 70% stenosis and mid long segment angiographic 70% stenosis   Circ Large-caliber vessel dominant vessel  Left circumflex artery has a mid focal area of angiographic 30% stenosis  First marginal branch of left circumflex artery is a moderate caliber vessel has ostial angiographic 30 to 40% stenosis  Second marginal branch of left circumflex artery is a large-caliber vessel has a long segment proximal mid angiographic 90% stenosis  Third marginal branch of the left circumflex artery is a left PDA system angiographically normal  Fourth marginal branch of left circumflex artery works as left PLB system angiographically normal    LV Normal LV systolic function normal wall motion estimate LV ejection fraction of 60%   Coronary Dominance Left circumflex artery        Impressions:  Severe multivessel coronary artery disease  Severe stenosis involving the mid LAD after the second diagonal branch involving the ostium of the diagonal branch  Severe stenosis involving a moderate-sized first diagonal branch and moderate-sized second diagonal branch  Severe stenosis involving the marginal branch of left circumflex artery  Severe stenosis involving the nondominant right coronary artery  Normal LV systolic function normal wall motion     Recommendations:  Options for PCI versus surgical revascularization reviewed and discussed with patient and family    Transthoracic echo:  Adult Transthoracic Echo Complete W/ Cont if Necessary Per Protocol  Interpretation Summary    Left ventricular systolic function is normal. Calculated left ventricular EF = 59% Left ventricular ejection fraction appears to  be 56 - 60%.    Left ventricular wall thickness is consistent with mild concentric hypertrophy.    Left ventricular diastolic function was normal.    Estimated right ventricular systolic pressure from tricuspid regurgitation is normal (<35 mmHg).    Moderate dilation of the ascending aorta is present.    Ascending aortic aneurysm measuring 4.1 cm    Stress Test With Myocardial Perfusion One Day  Interpretation Summary    Exercise myocardial perfusion study with a hypertensive response and also patient noted to have a somewhat nondiagnostic EKG changes with upsloping ST segment depression with exercise.  Myocardial perfusion study shows small apical perfusion defect at rest at peak stress after the exercise there is small to moderate size severe intensity perfusion defect with  small reversible ischemia.    Left ventricular ejection fraction is normal (Calculated EF = 59%).    Findings consistent with an indeterminate ECG stress test.    Impressions are consistent with an intermediate risk study.    Clinical correlation is recommended         Assessment & Plan       Abnormal nuclear stress test    CAD (coronary artery disease)      Assessment & Plan    - MV CAD, EF 60% (cath)--surgical evaluation in progress  - Ascending aortic aneurysm, 4.5 x 4.4 cm with mild AI--per echo, CTA 8/5 asc 4.2 cm  - Mixed HLD--statin  - GUANAKITO--does not have CPAP yet  - Family hx of premature CAD--father MI in his 40s/ CABG  - Hyperglycemia--preop A1c 6.23    Plans for CABG/ ascending aortic aneurysm repair.  D/w pt/family.  D/w Dr. John.  Check carotid duplex/ vein mapping before dc.  Plans for DORON on Monday and surgery on Thursday, 8/8.    Addendum:  Pt seen in [facility] with his wife.  CTA chest reviewed and ascending aorta measured 4.2 cm.  Dr. Domingo reviewed CTA.  No indication for ascending aortic repair.  Neuromonitoring cancelled.  Dr. Domingo d/w pt and wife.  Plans for CABG on Thursday 8/8.    Thank you for allowing  us to participate in the care of this patient.      Fadia Lance, [place]  08/05/24  13:04 EDT    **all problems new to this examiner  **EKG and CXR independently reviewed and interpreted

## 2024-08-05 NOTE — PAT
Emergency Contact:  Johnna moon- [phone]    Password:  [email or web address]    5MW results:   4.2, 3.7, 3.9 seconds

## 2024-08-07 ENCOUNTER — ANESTHESIA EVENT (OUTPATIENT)
Dept: PERIOP | Facility: HOSPITAL | Age: 54
End: 2024-08-07
Payer: COMMERCIAL

## 2024-08-07 NOTE — ANESTHESIA PREPROCEDURE EVALUATION
Anesthesia Evaluation     Patient summary reviewed and Nursing notes reviewed   NPO Solid Status: > 8 hours  NPO Liquid Status: > 8 hours           Airway   Mallampati: II  TM distance: >3 FB  Neck ROM: full  No difficulty expected  Dental - normal exam     Pulmonary - normal exam   Cardiovascular - normal exam    ECG reviewed    (+) valvular problems/murmurs, CAD, hyperlipidemia      Neuro/Psych  GI/Hepatic/Renal/Endo      Musculoskeletal     Abdominal  - normal exam    Bowel sounds: normal.   Substance History      OB/GYN          Other        ROS/Med Hx Other: SB      IMPRESSION:  1. Borderline aneurysm of the aortic root annulus (4.1 cm), and mid ascending aortic segment (4.2 cm).  2. No acute chest findings.  3. Coronary artery calcifications are present.        Severe multivessel coronary artery disease  Severe stenosis involving the mid LAD after the second diagonal branch involving the ostium of the diagonal branch  Severe stenosis involving a moderate-sized first diagonal branch and moderate-sized second diagonal branch  Severe stenosis involving the marginal branch of left circumflex artery  Severe stenosis involving the nondominant right coronary artery  Normal LV systolic function normal wall motion        Echocardiogram Findings    Left Ventricle Left ventricular systolic function is normal. Calculated left ventricular EF = 59% Left ventricular ejection fraction appears to be 56 - 60%.   Septal wall motion is normal. Normal global longitudinal LV strain (GLS) = 17.6%. Left ventricle strain data was reviewed by the physician and found to be accurate. Normal left ventricular cavity size noted. Left ventricular wall thickness is consistent with mild concentric hypertrophy. Left ventricular diastolic function was normal.  Right Ventricle Normal right ventricular cavity size and systolic function noted.  Left Atrium Normal left atrial cavity size noted.  Right Atrium Normal right atrial cavity size noted.  The inferior vena cava is normally sized. Normal IVC inspiratory collapse of greater than 50% noted.  Aortic Valve The aortic valve is grossly normal in structure. The aortic valve appears trileaflet. Mild aortic valve regurgitation is present. No hemodynamically significant aortic valve stenosis is present.  Mitral Valve The mitral valve is grossly normal in structure. Trace mitral valve regurgitation is present. No significant mitral valve stenosis is present.  Tricuspid Valve The tricuspid valve is grossly normal in structure. Trace tricuspid valve regurgitation is present. Estimated right ventricular systolic pressure from tricuspid regurgitation is normal (<35 mmHg). No evidence of significant tricuspid valve stenosis is present.  Pulmonic Valve The pulmonic valve is not well visualized. There is trace pulmonic valve regurgitation present. There is no pulmonic valve stenosis present.  Greater Vessels No dilation of the aortic root is present. Moderate dilation of the ascending aorta is present.  Pericardium The pericardium is normal. There is no evidence of pericardial effusion                        Anesthesia Plan    ASA 4     general, [place], CVL and PAC     intravenous induction     Anesthetic plan, risks, benefits, and alternatives have been provided, discussed and informed consent has been obtained with: patient.        CODE STATUS:

## 2024-08-08 ENCOUNTER — OFFICE VISIT (OUTPATIENT)
Dept: PERIOP | Facility: HOSPITAL | Age: 54
DRG: 236 | End: 2024-08-08
Payer: COMMERCIAL

## 2024-08-08 ENCOUNTER — HOSPITAL ENCOUNTER (INPATIENT)
Facility: HOSPITAL | Age: 54
LOS: 5 days | Discharge: HOME | DRG: 236 | End: 2024-08-13
Payer: COMMERCIAL

## 2024-08-08 ENCOUNTER — APPOINTMENT (OUTPATIENT)
Dept: GENERAL RADIOLOGY | Facility: HOSPITAL | Age: 54
DRG: 236 | End: 2024-08-08
Payer: COMMERCIAL

## 2024-08-08 ENCOUNTER — ANESTHESIA (OUTPATIENT)
Dept: PERIOP | Facility: HOSPITAL | Age: 54
End: 2024-08-08
Payer: COMMERCIAL

## 2024-08-08 DIAGNOSIS — I71.21 ANEURYSM OF ASCENDING AORTA WITHOUT RUPTURE: ICD-10-CM

## 2024-08-08 DIAGNOSIS — Z95.1 S/P CABG X 3: Primary | ICD-10-CM

## 2024-08-08 DIAGNOSIS — I25.10 CORONARY ARTERY DISEASE INVOLVING NATIVE CORONARY ARTERY OF NATIVE HEART WITHOUT ANGINA PECTORIS: ICD-10-CM

## 2024-08-08 LAB
ACT BLD: 146 SECONDS (ref 89–137)
ACT BLD: 152 SECONDS (ref 89–137)
ACT BLD: 299 SECONDS (ref 89–137)
ACT BLD: 330 SECONDS (ref 89–137)
ACT BLD: 366 SECONDS (ref 89–137)
ALBUMIN SERPL-MCNC: 3.4 G/DL (ref 3.5–5.2)
ANION GAP SERPL CALCULATED.3IONS-SCNC: 5.8 MMOL/L (ref 5–15)
APTT PPP: 31.9 SECONDS (ref 24–31)
ARTERIAL PATENCY WRIST A: ABNORMAL
ATMOSPHERIC PRESS: ABNORMAL MM[HG]
BASE DEFICIT: ABNORMAL
BASE EXCESS BLDA CALC-SCNC: -1.6 MMOL/L (ref 0–3)
BASE EXCESS BLDA CALC-SCNC: -1.8 MMOL/L (ref 0–3)
BASE EXCESS BLDA CALC-SCNC: -1.9 MMOL/L (ref 0–3)
BASE EXCESS BLDA CALC-SCNC: -2 MMOL/L (ref 0–3)
BASE EXCESS BLDA CALC-SCNC: -2.5 MMOL/L (ref 0–3)
BASE EXCESS BLDA CALC-SCNC: -3.4 MMOL/L (ref 0–3)
BASE EXCESS BLDA CALC-SCNC: -3.5 MMOL/L (ref 0–3)
BASE EXCESS BLDA CALC-SCNC: <0 MMOL/L (ref 0–3)
BASOPHILS # BLD AUTO: 0.05 10*3/MM3 (ref 0–0.2)
BASOPHILS NFR BLD AUTO: 0.4 % (ref 0–1.5)
BDY SITE: ABNORMAL
BH BB BLOOD EXPIRATION DATE: NORMAL
BH BB BLOOD EXPIRATION DATE: NORMAL
BH BB BLOOD TYPE BARCODE: 6200
BH BB BLOOD TYPE BARCODE: 6200
BH BB DISPENSE STATUS: NORMAL
BH BB DISPENSE STATUS: NORMAL
BH BB PRODUCT CODE: NORMAL
BH BB PRODUCT CODE: NORMAL
BH BB UNIT NUMBER: NORMAL
BH BB UNIT NUMBER: NORMAL
BODY TEMPERATURE: 94.1
BUN SERPL-MCNC: 9 MG/DL (ref 6–20)
BUN/CREAT SERPL: 11.4 (ref 7–25)
CA-I BLDA-SCNC: 1.13 MMOL/L (ref 1.15–1.33)
CA-I BLDA-SCNC: 1.13 MMOL/L (ref 1.15–1.33)
CA-I BLDA-SCNC: 1.15 MMOL/L (ref 1.15–1.33)
CA-I BLDA-SCNC: 1.16 MMOL/L (ref 1.12–1.32)
CA-I BLDA-SCNC: 1.17 MMOL/L (ref 1.12–1.32)
CA-I BLDA-SCNC: 1.17 MMOL/L (ref 1.15–1.33)
CA-I BLDA-SCNC: 1.18 MMOL/L (ref 1.12–1.32)
CA-I BLDA-SCNC: 1.18 MMOL/L (ref 1.12–1.32)
CA-I BLDA-SCNC: 1.18 MMOL/L (ref 1.15–1.33)
CA-I BLDA-SCNC: 1.21 MMOL/L (ref 1.15–1.33)
CA-I BLDA-SCNC: 1.21 MMOL/L (ref 1.15–1.33)
CA-I SERPL ISE-MCNC: 1.07 MMOL/L (ref 1.15–1.25)
CALCIUM SPEC-SCNC: 7.6 MG/DL (ref 8.6–10.5)
CHLORIDE SERPL-SCNC: 108 MMOL/L (ref 98–107)
CO2 BLDA-SCNC: 26 MMOL/L (ref 23–27)
CO2 BLDA-SCNC: 27 MMOL/L (ref 23–27)
CO2 BLDA-SCNC: 27 MMOL/L (ref 23–27)
CO2 BLDA-SCNC: 29 MMOL/L (ref 23–27)
CO2 SERPL-SCNC: 24.2 MMOL/L (ref 22–29)
CREAT BLDA-MCNC: 0.73 MG/DL (ref 0.6–1.3)
CREAT BLDA-MCNC: 0.77 MG/DL (ref 0.6–1.3)
CREAT SERPL-MCNC: 0.79 MG/DL (ref 0.76–1.27)
CROSSMATCH INTERPRETATION: NORMAL
CROSSMATCH INTERPRETATION: NORMAL
D-LACTATE SERPL-SCNC: 1.4 MMOL/L (ref 0.2–2)
D-LACTATE SERPL-SCNC: 1.5 MMOL/L (ref 0.2–2)
DEPRECATED RDW RBC AUTO: 40.6 FL (ref 37–54)
EGFRCR SERPLBLD CKD-EPI 2021: 106.2 ML/MIN/1.73
EGFRCR SERPLBLD CKD-EPI 2021: 107.1 ML/MIN/1.73
EGFRCR SERPLBLD CKD-EPI 2021: 108.8 ML/MIN/1.73
EOSINOPHIL # BLD AUTO: 0.34 10*3/MM3 (ref 0–0.4)
EOSINOPHIL NFR BLD AUTO: 2.4 % (ref 0.3–6.2)
ERYTHROCYTE [DISTWIDTH] IN BLOOD BY AUTOMATED COUNT: 12.6 % (ref 12.3–15.4)
GLUCOSE BLDC GLUCOMTR-MCNC: 114 MG/DL (ref 70–105)
GLUCOSE BLDC GLUCOMTR-MCNC: 119 MG/DL (ref 70–105)
GLUCOSE BLDC GLUCOMTR-MCNC: 126 MG/DL (ref 74–100)
GLUCOSE BLDC GLUCOMTR-MCNC: 126 MG/DL (ref 74–100)
GLUCOSE BLDC GLUCOMTR-MCNC: 128 MG/DL (ref 74–100)
GLUCOSE BLDC GLUCOMTR-MCNC: 128 MG/DL (ref 74–100)
GLUCOSE BLDC GLUCOMTR-MCNC: 129 MG/DL (ref 74–100)
GLUCOSE BLDC GLUCOMTR-MCNC: 129 MG/DL (ref 74–100)
GLUCOSE BLDC GLUCOMTR-MCNC: 133 MG/DL (ref 70–105)
GLUCOSE BLDC GLUCOMTR-MCNC: 134 MG/DL (ref 74–100)
GLUCOSE BLDC GLUCOMTR-MCNC: 134 MG/DL (ref 74–100)
GLUCOSE BLDC GLUCOMTR-MCNC: 135 MG/DL (ref 70–105)
GLUCOSE BLDC GLUCOMTR-MCNC: 135 MG/DL (ref 74–100)
GLUCOSE BLDC GLUCOMTR-MCNC: 135 MG/DL (ref 74–100)
GLUCOSE BLDC GLUCOMTR-MCNC: 138 MG/DL (ref 70–105)
GLUCOSE BLDC GLUCOMTR-MCNC: 138 MG/DL (ref 74–100)
GLUCOSE BLDC GLUCOMTR-MCNC: 138 MG/DL (ref 74–100)
GLUCOSE BLDC GLUCOMTR-MCNC: 139 MG/DL (ref 74–100)
GLUCOSE BLDC GLUCOMTR-MCNC: 139 MG/DL (ref 74–100)
GLUCOSE BLDC GLUCOMTR-MCNC: 155 MG/DL (ref 70–105)
GLUCOSE BLDC GLUCOMTR-MCNC: 164 MG/DL (ref 70–105)
GLUCOSE SERPL-MCNC: 129 MG/DL (ref 65–99)
HCO3 BLDA-SCNC: 21.5 MMOL/L (ref 21–28)
HCO3 BLDA-SCNC: 22.6 MMOL/L (ref 21–28)
HCO3 BLDA-SCNC: 23.7 MMOL/L (ref 21–28)
HCO3 BLDA-SCNC: 23.9 MMOL/L (ref 21–28)
HCO3 BLDA-SCNC: 24 MMOL/L (ref 21–28)
HCO3 BLDA-SCNC: 24.2 MMOL/L (ref 21–28)
HCO3 BLDA-SCNC: 24.9 MMOL/L (ref 21–28)
HCO3 BLDA-SCNC: 24.9 MMOL/L (ref 22–26)
HCO3 BLDA-SCNC: 25 MMOL/L (ref 22–26)
HCO3 BLDA-SCNC: 25.6 MMOL/L (ref 22–26)
HCO3 BLDA-SCNC: 26.9 MMOL/L (ref 22–26)
HCT VFR BLD AUTO: 39.3 % (ref 37.5–51)
HCT VFR BLDA CALC: 35 % (ref 38–51)
HCT VFR BLDA CALC: 36 % (ref 38–51)
HCT VFR BLDA CALC: 37 % (ref 38–51)
HCT VFR BLDA CALC: 40 % (ref 38–51)
HCT VFR BLDA CALC: 41 % (ref 38–51)
HCT VFR BLDA CALC: 42 % (ref 38–51)
HEMODILUTION: NO
HEMODILUTION: YES
HGB BLD-MCNC: 12.6 G/DL (ref 13–17.7)
HGB BLDA-MCNC: 11.9 G/DL (ref 12–17)
HGB BLDA-MCNC: 12.2 G/DL (ref 12–17)
HGB BLDA-MCNC: 12.6 G/DL (ref 12–17)
HGB BLDA-MCNC: 13.7 G/DL (ref 12–17)
HGB BLDA-MCNC: 13.9 G/DL (ref 12–17)
HGB BLDA-MCNC: 13.9 G/DL (ref 12–17)
HGB BLDA-MCNC: 14.1 G/DL (ref 12–17)
HGB BLDA-MCNC: 14.1 G/DL (ref 12–17)
HGB BLDA-MCNC: 14.4 G/DL (ref 12–17)
IMM GRANULOCYTES # BLD AUTO: 0.12 10*3/MM3 (ref 0–0.05)
IMM GRANULOCYTES NFR BLD AUTO: 0.8 % (ref 0–0.5)
INHALED O2 CONCENTRATION: 36 %
INHALED O2 CONCENTRATION: 40 %
INHALED O2 CONCENTRATION: 50 %
INHALED O2 CONCENTRATION: 70 %
INR PPP: 1.1 (ref 0.93–1.1)
LYMPHOCYTES # BLD AUTO: 1.31 10*3/MM3 (ref 0.7–3.1)
LYMPHOCYTES NFR BLD AUTO: 9.3 % (ref 19.6–45.3)
MCH RBC QN AUTO: 28.2 PG (ref 26.6–33)
MCHC RBC AUTO-ENTMCNC: 32.1 G/DL (ref 31.5–35.7)
MCV RBC AUTO: 87.9 FL (ref 79–97)
MODALITY: ABNORMAL
MONOCYTES # BLD AUTO: 0.71 10*3/MM3 (ref 0.1–0.9)
MONOCYTES NFR BLD AUTO: 5 % (ref 5–12)
NEUTROPHILS NFR BLD AUTO: 11.59 10*3/MM3 (ref 1.7–7)
NEUTROPHILS NFR BLD AUTO: 82.1 % (ref 42.7–76)
NRBC BLD AUTO-RTO: 0 /100 WBC (ref 0–0.2)
PCO2 BLDA: 37.8 MM HG (ref 35–48)
PCO2 BLDA: 42.4 MM HG (ref 35–48)
PCO2 BLDA: 43.2 MM HG (ref 35–48)
PCO2 BLDA: 43.6 MM HG (ref 35–48)
PCO2 BLDA: 44.1 MM HG (ref 35–48)
PCO2 BLDA: 44.3 MM HG (ref 35–48)
PCO2 BLDA: 50.7 MM HG (ref 35–45)
PCO2 BLDA: 52.8 MM HG (ref 35–48)
PCO2 BLDA: 59.7 MM HG (ref 35–45)
PCO2 BLDA: 61.6 MM HG (ref 35–45)
PCO2 BLDA: 67.9 MM HG (ref 35–45)
PCO2 TEMP ADJ BLD: 38.7 MM HG (ref 35–48)
PEEP RESPIRATORY: 5 CM[H2O]
PEEP RESPIRATORY: 8 CM[H2O]
PEEP RESPIRATORY: 8 CM[H2O]
PH BLDA: 7.21 PH UNITS (ref 7.35–7.45)
PH BLDA: 7.23 PH UNITS (ref 7.35–7.45)
PH BLDA: 7.23 PH UNITS (ref 7.35–7.45)
PH BLDA: 7.28 PH UNITS (ref 7.35–7.45)
PH BLDA: 7.3 PH UNITS (ref 7.35–7.45)
PH BLDA: 7.33 PH UNITS (ref 7.35–7.45)
PH BLDA: 7.34 PH UNITS (ref 7.35–7.45)
PH BLDA: 7.34 PH UNITS (ref 7.35–7.45)
PH BLDA: 7.35 PH UNITS (ref 7.35–7.45)
PH BLDA: 7.36 PH UNITS (ref 7.35–7.45)
PH BLDA: 7.36 PH UNITS (ref 7.35–7.45)
PH, TEMP CORRECTED: 7.36 PH UNITS (ref 7.35–7.45)
PHOSPHATE SERPL-MCNC: 2.5 MG/DL (ref 2.5–4.5)
PLATELET # BLD AUTO: 214 10*3/MM3 (ref 140–450)
PMV BLD AUTO: 9 FL (ref 6–12)
PO2 BLD: 124 MM[HG] (ref 0–500)
PO2 BLD: 188 MM[HG] (ref 0–500)
PO2 BLD: 236 MM[HG] (ref 0–500)
PO2 BLD: 261 MM[HG] (ref 0–500)
PO2 BLD: 269 MM[HG] (ref 0–500)
PO2 BLD: 308 MM[HG] (ref 0–500)
PO2 BLD: 323 MM[HG] (ref 0–500)
PO2 BLDA: 104.4 MM HG (ref 83–108)
PO2 BLDA: 107.5 MM HG (ref 83–108)
PO2 BLDA: 110.7 MM HG (ref 83–108)
PO2 BLDA: 129.1 MM HG (ref 83–108)
PO2 BLDA: 207 MM HG (ref 80–105)
PO2 BLDA: 354 MM HG (ref 80–105)
PO2 BLDA: 64 MM HG (ref 80–105)
PO2 BLDA: 86.9 MM HG (ref 83–108)
PO2 BLDA: 94.1 MM HG (ref 83–108)
PO2 BLDA: 94.5 MM HG (ref 83–108)
PO2 BLDA: 96 MM HG (ref 80–105)
PO2 TEMP ADJ BLD: 74.1 MM HG (ref 83–108)
POTASSIUM BLDA-SCNC: 4 MMOL/L (ref 3.5–4.5)
POTASSIUM BLDA-SCNC: 4 MMOL/L (ref 3.5–4.9)
POTASSIUM BLDA-SCNC: 4.1 MMOL/L (ref 3.5–4.9)
POTASSIUM BLDA-SCNC: 4.2 MMOL/L (ref 3.5–4.5)
POTASSIUM BLDA-SCNC: 4.3 MMOL/L (ref 3.5–4.5)
POTASSIUM BLDA-SCNC: 4.3 MMOL/L (ref 3.5–4.5)
POTASSIUM BLDA-SCNC: 4.3 MMOL/L (ref 3.5–4.9)
POTASSIUM BLDA-SCNC: 4.4 MMOL/L (ref 3.5–4.5)
POTASSIUM BLDA-SCNC: 4.4 MMOL/L (ref 3.5–4.5)
POTASSIUM BLDA-SCNC: 4.4 MMOL/L (ref 3.5–4.9)
POTASSIUM BLDA-SCNC: 4.5 MMOL/L (ref 3.5–4.5)
POTASSIUM SERPL-SCNC: 4.1 MMOL/L (ref 3.5–5.2)
PROTHROMBIN TIME: 11.9 SECONDS (ref 9.6–11.7)
PSV: 10 CMH2O
PSV: 5 CMH2O
RBC # BLD AUTO: 4.47 10*6/MM3 (ref 4.14–5.8)
RESPIRATORY RATE: 12
SAO2 % BLDCOA: 100 % (ref 95–98)
SAO2 % BLDCOA: 100 % (ref 95–98)
SAO2 % BLDCOA: 86 % (ref 95–98)
SAO2 % BLDCOA: 95.8 % (ref 94–98)
SAO2 % BLDCOA: 96 % (ref 95–98)
SAO2 % BLDCOA: 96.9 % (ref 94–98)
SAO2 % BLDCOA: 97.1 % (ref 94–98)
SAO2 % BLDCOA: 97.1 % (ref 94–98)
SAO2 % BLDCOA: 97.9 % (ref 94–98)
SAO2 % BLDCOA: 98 % (ref 94–98)
SAO2 % BLDCOA: 98.8 % (ref 94–98)
SODIUM BLD-SCNC: 139 MMOL/L (ref 138–146)
SODIUM BLD-SCNC: 140 MMOL/L (ref 138–146)
SODIUM BLD-SCNC: 141 MMOL/L (ref 138–146)
SODIUM BLD-SCNC: 142 MMOL/L (ref 138–146)
SODIUM SERPL-SCNC: 138 MMOL/L (ref 136–145)
UNIT  ABO: NORMAL
UNIT  ABO: NORMAL
UNIT  RH: NORMAL
UNIT  RH: NORMAL
VENTILATOR MODE: ABNORMAL
VENTILATOR MODE: AC
VT ON VENT VENT: 700 ML
WBC NRBC COR # BLD AUTO: 14.12 10*3/MM3 (ref 3.4–10.8)

## 2024-08-08 PROCEDURE — 25010000002 PAPAVERINE PER 60 MG

## 2024-08-08 PROCEDURE — 94799 UNLISTED PULMONARY SVC/PX: CPT

## 2024-08-08 PROCEDURE — C1713 ANCHOR/SCREW BN/BN,TIS/BN: HCPCS

## 2024-08-08 PROCEDURE — 82947 ASSAY GLUCOSE BLOOD QUANT: CPT

## 2024-08-08 PROCEDURE — 82803 BLOOD GASES ANY COMBINATION: CPT | Performed by: NURSE PRACTITIONER

## 2024-08-08 PROCEDURE — 33535 CABG ARTERIAL THREE: CPT

## 2024-08-08 PROCEDURE — 25010000002 NICARDIPINE 2.5 MG/ML SOLUTION: Performed by: ANESTHESIOLOGY

## 2024-08-08 PROCEDURE — 84295 ASSAY OF SERUM SODIUM: CPT

## 2024-08-08 PROCEDURE — C1751 CATH, INF, PER/CENT/MIDLINE: HCPCS | Performed by: ANESTHESIOLOGY

## 2024-08-08 PROCEDURE — 25010000002 PHENYLEPHRINE 10 MG/ML SOLUTION: Performed by: ANESTHESIOLOGY

## 2024-08-08 PROCEDURE — C1751 CATH, INF, PER/CENT/MIDLINE: HCPCS

## 2024-08-08 PROCEDURE — 84132 ASSAY OF SERUM POTASSIUM: CPT

## 2024-08-08 PROCEDURE — 85730 THROMBOPLASTIN TIME PARTIAL: CPT | Performed by: NURSE PRACTITIONER

## 2024-08-08 PROCEDURE — 85018 HEMOGLOBIN: CPT

## 2024-08-08 PROCEDURE — 93005 ELECTROCARDIOGRAM TRACING: CPT | Performed by: NURSE PRACTITIONER

## 2024-08-08 PROCEDURE — C1887 CATHETER, GUIDING: HCPCS

## 2024-08-08 PROCEDURE — 25010000002 PROTAMINE SULFATE PER 10 MG: Performed by: ANESTHESIOLOGY

## 2024-08-08 PROCEDURE — 99222 1ST HOSP IP/OBS MODERATE 55: CPT | Performed by: INTERNAL MEDICINE

## 2024-08-08 PROCEDURE — 02100Z9 BYPASS CORONARY ARTERY, ONE ARTERY FROM LEFT INTERNAL MAMMARY, OPEN APPROACH: ICD-10-PCS

## 2024-08-08 PROCEDURE — 85014 HEMATOCRIT: CPT

## 2024-08-08 PROCEDURE — C1729 CATH, DRAINAGE: HCPCS

## 2024-08-08 PROCEDURE — 85347 COAGULATION TIME ACTIVATED: CPT

## 2024-08-08 PROCEDURE — 25010000002 CEFAZOLIN PER 500 MG: Performed by: NURSE PRACTITIONER

## 2024-08-08 PROCEDURE — 25010000002 MIDAZOLAM PER 1 MG: Performed by: ANESTHESIOLOGY

## 2024-08-08 PROCEDURE — 82330 ASSAY OF CALCIUM: CPT | Performed by: NURSE PRACTITIONER

## 2024-08-08 PROCEDURE — 25010000002 HEPARIN (PORCINE) PER 1000 UNITS: Performed by: ANESTHESIOLOGY

## 2024-08-08 PROCEDURE — 85610 PROTHROMBIN TIME: CPT | Performed by: NURSE PRACTITIONER

## 2024-08-08 PROCEDURE — 71045 X-RAY EXAM CHEST 1 VIEW: CPT

## 2024-08-08 PROCEDURE — 80069 RENAL FUNCTION PANEL: CPT | Performed by: NURSE PRACTITIONER

## 2024-08-08 PROCEDURE — 85025 COMPLETE CBC W/AUTO DIFF WBC: CPT | Performed by: NURSE PRACTITIONER

## 2024-08-08 PROCEDURE — 82948 REAGENT STRIP/BLOOD GLUCOSE: CPT

## 2024-08-08 PROCEDURE — 25010000002 MORPHINE PER 10 MG: Performed by: NURSE PRACTITIONER

## 2024-08-08 PROCEDURE — 25010000002 CEFAZOLIN PER 500 MG: Performed by: ANESTHESIOLOGY

## 2024-08-08 PROCEDURE — 25810000003 SODIUM CHLORIDE 0.9 % SOLUTION: Performed by: ANESTHESIOLOGY

## 2024-08-08 PROCEDURE — 25010000002 CALCIUM GLUCONATE 2-0.675 GM/100ML-% SOLUTION: Performed by: NURSE PRACTITIONER

## 2024-08-08 PROCEDURE — 25010000002 ACETAMINOPHEN 10 MG/ML SOLUTION: Performed by: NURSE PRACTITIONER

## 2024-08-08 PROCEDURE — 25010000002 ONDANSETRON PER 1 MG: Performed by: NURSE PRACTITIONER

## 2024-08-08 PROCEDURE — 94761 N-INVAS EAR/PLS OXIMETRY MLT: CPT

## 2024-08-08 PROCEDURE — 93010 ELECTROCARDIOGRAM REPORT: CPT | Performed by: INTERNAL MEDICINE

## 2024-08-08 PROCEDURE — P9041 ALBUMIN (HUMAN),5%, 50ML: HCPCS | Performed by: NURSE PRACTITIONER

## 2024-08-08 PROCEDURE — 93318 ECHO TRANSESOPHAGEAL INTRAOP: CPT | Performed by: ANESTHESIOLOGY

## 2024-08-08 PROCEDURE — 25010000002 ACETAMINOPHEN 10 MG/ML SOLUTION: Performed by: ANESTHESIOLOGY

## 2024-08-08 PROCEDURE — 25010000002 ONDANSETRON PER 1 MG: Performed by: ANESTHESIOLOGY

## 2024-08-08 PROCEDURE — 82565 ASSAY OF CREATININE: CPT

## 2024-08-08 PROCEDURE — 94002 VENT MGMT INPAT INIT DAY: CPT

## 2024-08-08 PROCEDURE — 25010000002 FENTANYL CITRATE (PF) 250 MCG/5ML SOLUTION: Performed by: ANESTHESIOLOGY

## 2024-08-08 PROCEDURE — 33535 CABG ARTERIAL THREE: CPT | Performed by: PHYSICIAN ASSISTANT

## 2024-08-08 PROCEDURE — 02110Z8 BYPASS CORONARY ARTERY, TWO ARTERIES FROM RIGHT INTERNAL MAMMARY, OPEN APPROACH: ICD-10-PCS

## 2024-08-08 PROCEDURE — 25010000002 ALBUMIN HUMAN 5% PER 50 ML: Performed by: NURSE PRACTITIONER

## 2024-08-08 PROCEDURE — 25010000002 HYDRALAZINE PER 20 MG

## 2024-08-08 PROCEDURE — 82803 BLOOD GASES ANY COMBINATION: CPT

## 2024-08-08 PROCEDURE — 80051 ELECTROLYTE PANEL: CPT

## 2024-08-08 PROCEDURE — 25010000002 HEPARIN (PORCINE) PER 1000 UNITS

## 2024-08-08 PROCEDURE — 25010000002 MAGNESIUM SULFATE IN D5W 1G/100ML (PREMIX) 1-5 GM/100ML-% SOLUTION: Performed by: NURSE PRACTITIONER

## 2024-08-08 PROCEDURE — 74018 RADEX ABDOMEN 1 VIEW: CPT

## 2024-08-08 PROCEDURE — 82330 ASSAY OF CALCIUM: CPT

## 2024-08-08 PROCEDURE — 83605 ASSAY OF LACTIC ACID: CPT

## 2024-08-08 RX ORDER — ALPRAZOLAM 0.25 MG/1
0.25 TABLET ORAL ONCE
Status: DISCONTINUED | OUTPATIENT
Start: 2024-08-08 | End: 2024-08-08 | Stop reason: HOSPADM

## 2024-08-08 RX ORDER — CHLORHEXIDINE GLUCONATE 500 MG/1
CLOTH TOPICAL EVERY 12 HOURS PRN
Status: DISCONTINUED | OUTPATIENT
Start: 2024-08-08 | End: 2024-08-08

## 2024-08-08 RX ORDER — CALCIUM GLUCONATE 20 MG/ML
2000 INJECTION, SOLUTION INTRAVENOUS ONCE
Status: COMPLETED | OUTPATIENT
Start: 2024-08-08 | End: 2024-08-08

## 2024-08-08 RX ORDER — NITROGLYCERIN 20 MG/100ML
5-50 INJECTION INTRAVENOUS CONTINUOUS PRN
Status: DISCONTINUED | OUTPATIENT
Start: 2024-08-08 | End: 2024-08-09

## 2024-08-08 RX ORDER — ONDANSETRON 2 MG/ML
INJECTION INTRAMUSCULAR; INTRAVENOUS AS NEEDED
Status: DISCONTINUED | OUTPATIENT
Start: 2024-08-08 | End: 2024-08-08 | Stop reason: SURG

## 2024-08-08 RX ORDER — ASPIRIN 325 MG
325 TABLET ORAL ONCE
Status: COMPLETED | OUTPATIENT
Start: 2024-08-08 | End: 2024-08-08

## 2024-08-08 RX ORDER — HEPARIN SODIUM 1000 [USP'U]/ML
INJECTION, SOLUTION INTRAVENOUS; SUBCUTANEOUS AS NEEDED
Status: DISCONTINUED | OUTPATIENT
Start: 2024-08-08 | End: 2024-08-08 | Stop reason: SURG

## 2024-08-08 RX ORDER — NICARDIPINE HYDROCHLORIDE 2.5 MG/ML
INJECTION INTRAVENOUS AS NEEDED
Status: DISCONTINUED | OUTPATIENT
Start: 2024-08-08 | End: 2024-08-08 | Stop reason: SURG

## 2024-08-08 RX ORDER — ENOXAPARIN SODIUM 100 MG/ML
40 INJECTION SUBCUTANEOUS DAILY
Status: DISCONTINUED | OUTPATIENT
Start: 2024-08-09 | End: 2024-08-13 | Stop reason: HOSPADM

## 2024-08-08 RX ORDER — ACETAMINOPHEN 325 MG/1
650 TABLET ORAL EVERY 4 HOURS PRN
Status: DISCONTINUED | OUTPATIENT
Start: 2024-08-08 | End: 2024-08-08 | Stop reason: HOSPADM

## 2024-08-08 RX ORDER — ALBUMIN, HUMAN INJ 5% 5 %
12.5 SOLUTION INTRAVENOUS AS NEEDED
Status: DISCONTINUED | OUTPATIENT
Start: 2024-08-08 | End: 2024-08-09

## 2024-08-08 RX ORDER — SODIUM CHLORIDE 0.9 % (FLUSH) 0.9 %
3-10 SYRINGE (ML) INJECTION AS NEEDED
Status: DISCONTINUED | OUTPATIENT
Start: 2024-08-08 | End: 2024-08-08 | Stop reason: HOSPADM

## 2024-08-08 RX ORDER — MEPERIDINE HYDROCHLORIDE 25 MG/ML
25 INJECTION INTRAMUSCULAR; INTRAVENOUS; SUBCUTANEOUS ONCE AS NEEDED
Status: DISCONTINUED | OUTPATIENT
Start: 2024-08-08 | End: 2024-08-13 | Stop reason: HOSPADM

## 2024-08-08 RX ORDER — FENTANYL CITRATE 50 UG/ML
INJECTION, SOLUTION INTRAMUSCULAR; INTRAVENOUS AS NEEDED
Status: DISCONTINUED | OUTPATIENT
Start: 2024-08-08 | End: 2024-08-08 | Stop reason: SURG

## 2024-08-08 RX ORDER — ACETAMINOPHEN 10 MG/ML
1000 INJECTION, SOLUTION INTRAVENOUS EVERY 8 HOURS
Status: COMPLETED | OUTPATIENT
Start: 2024-08-08 | End: 2024-08-09

## 2024-08-08 RX ORDER — SODIUM CHLORIDE 0.9 % (FLUSH) 0.9 %
3 SYRINGE (ML) INJECTION EVERY 12 HOURS SCHEDULED
Status: DISCONTINUED | OUTPATIENT
Start: 2024-08-08 | End: 2024-08-08 | Stop reason: HOSPADM

## 2024-08-08 RX ORDER — IBUPROFEN 600 MG/1
1 TABLET ORAL
Status: DISCONTINUED | OUTPATIENT
Start: 2024-08-08 | End: 2024-08-08 | Stop reason: HOSPADM

## 2024-08-08 RX ORDER — CHLORHEXIDINE GLUCONATE 500 MG/1
CLOTH TOPICAL EVERY 12 HOURS PRN
Status: DISCONTINUED | OUTPATIENT
Start: 2024-08-08 | End: 2024-08-08 | Stop reason: HOSPADM

## 2024-08-08 RX ORDER — SODIUM CHLORIDE 0.9 % (FLUSH) 0.9 %
30 SYRINGE (ML) INJECTION ONCE AS NEEDED
Status: DISCONTINUED | OUTPATIENT
Start: 2024-08-08 | End: 2024-08-08 | Stop reason: HOSPADM

## 2024-08-08 RX ORDER — PANTOPRAZOLE SODIUM 40 MG/1
40 TABLET, DELAYED RELEASE ORAL
Status: DISCONTINUED | OUTPATIENT
Start: 2024-08-09 | End: 2024-08-13 | Stop reason: HOSPADM

## 2024-08-08 RX ORDER — IBUPROFEN 600 MG/1
1 TABLET ORAL
Status: ACTIVE | OUTPATIENT
Start: 2024-08-08 | End: 2024-08-11

## 2024-08-08 RX ORDER — ACETAMINOPHEN 160 MG/5ML
650 SOLUTION ORAL EVERY 4 HOURS PRN
Status: DISCONTINUED | OUTPATIENT
Start: 2024-08-09 | End: 2024-08-13 | Stop reason: HOSPADM

## 2024-08-08 RX ORDER — HYDRALAZINE HYDROCHLORIDE 20 MG/ML
20 INJECTION INTRAMUSCULAR; INTRAVENOUS EVERY 4 HOURS PRN
Status: DISCONTINUED | OUTPATIENT
Start: 2024-08-08 | End: 2024-08-13 | Stop reason: HOSPADM

## 2024-08-08 RX ORDER — NICOTINE POLACRILEX 4 MG
15 LOZENGE BUCCAL
Status: ACTIVE | OUTPATIENT
Start: 2024-08-08 | End: 2024-08-11

## 2024-08-08 RX ORDER — PHENYLEPHRINE HYDROCHLORIDE 10 MG/ML
INJECTION INTRAVENOUS AS NEEDED
Status: DISCONTINUED | OUTPATIENT
Start: 2024-08-08 | End: 2024-08-08 | Stop reason: SURG

## 2024-08-08 RX ORDER — ONDANSETRON 2 MG/ML
4 INJECTION INTRAMUSCULAR; INTRAVENOUS EVERY 6 HOURS PRN
Status: DISCONTINUED | OUTPATIENT
Start: 2024-08-08 | End: 2024-08-13 | Stop reason: HOSPADM

## 2024-08-08 RX ORDER — ACETAMINOPHEN 10 MG/ML
INJECTION, SOLUTION INTRAVENOUS AS NEEDED
Status: DISCONTINUED | OUTPATIENT
Start: 2024-08-08 | End: 2024-08-08 | Stop reason: SURG

## 2024-08-08 RX ORDER — AMOXICILLIN 250 MG
2 CAPSULE ORAL 2 TIMES DAILY
Status: DISCONTINUED | OUTPATIENT
Start: 2024-08-08 | End: 2024-08-13 | Stop reason: HOSPADM

## 2024-08-08 RX ORDER — CHLORHEXIDINE GLUCONATE ORAL RINSE 1.2 MG/ML
15 SOLUTION DENTAL EVERY 12 HOURS
Status: DISCONTINUED | OUTPATIENT
Start: 2024-08-08 | End: 2024-08-13 | Stop reason: HOSPADM

## 2024-08-08 RX ORDER — NOREPINEPHRINE BITARTRATE 0.03 MG/ML
.02-.06 INJECTION, SOLUTION INTRAVENOUS CONTINUOUS PRN
Status: DISCONTINUED | OUTPATIENT
Start: 2024-08-08 | End: 2024-08-09

## 2024-08-08 RX ORDER — NITROGLYCERIN 0.4 MG/1
0.4 TABLET SUBLINGUAL
Status: DISCONTINUED | OUTPATIENT
Start: 2024-08-08 | End: 2024-08-08 | Stop reason: HOSPADM

## 2024-08-08 RX ORDER — SODIUM CHLORIDE 9 MG/ML
30 INJECTION, SOLUTION INTRAVENOUS CONTINUOUS PRN
Status: DISCONTINUED | OUTPATIENT
Start: 2024-08-08 | End: 2024-08-08

## 2024-08-08 RX ORDER — NOREPINEPHRINE BITARTRATE 0.03 MG/ML
INJECTION, SOLUTION INTRAVENOUS CONTINUOUS PRN
Status: DISCONTINUED | OUTPATIENT
Start: 2024-08-08 | End: 2024-08-08 | Stop reason: SURG

## 2024-08-08 RX ORDER — MAGNESIUM SULFATE 1 G/100ML
1 INJECTION INTRAVENOUS EVERY 8 HOURS
Status: DISCONTINUED | OUTPATIENT
Start: 2024-08-08 | End: 2024-08-09 | Stop reason: ALTCHOICE

## 2024-08-08 RX ORDER — ASPIRIN 81 MG/1
81 TABLET ORAL DAILY
Status: DISCONTINUED | OUTPATIENT
Start: 2024-08-09 | End: 2024-08-13 | Stop reason: HOSPADM

## 2024-08-08 RX ORDER — MAGNESIUM HYDROXIDE 1200 MG/15ML
LIQUID ORAL AS NEEDED
Status: DISCONTINUED | OUTPATIENT
Start: 2024-08-08 | End: 2024-08-08 | Stop reason: HOSPADM

## 2024-08-08 RX ORDER — VASOPRESSIN IN DEXTROSE 5 % 20/100 ML
.02-.1 PLASTIC BAG, INJECTION (ML) INTRAVENOUS AS NEEDED
Status: DISCONTINUED | OUTPATIENT
Start: 2024-08-08 | End: 2024-08-09

## 2024-08-08 RX ORDER — CHLORHEXIDINE GLUCONATE ORAL RINSE 1.2 MG/ML
15 SOLUTION DENTAL EVERY 12 HOURS
Status: DISCONTINUED | OUTPATIENT
Start: 2024-08-08 | End: 2024-08-08

## 2024-08-08 RX ORDER — SODIUM CHLORIDE 9 MG/ML
30 INJECTION, SOLUTION INTRAVENOUS CONTINUOUS
Status: DISPENSED | OUTPATIENT
Start: 2024-08-08 | End: 2024-08-11

## 2024-08-08 RX ORDER — EPHEDRINE SULFATE 5 MG/ML
INJECTION INTRAVENOUS AS NEEDED
Status: DISCONTINUED | OUTPATIENT
Start: 2024-08-08 | End: 2024-08-08 | Stop reason: SURG

## 2024-08-08 RX ORDER — VECURONIUM BROMIDE 1 MG/ML
INJECTION, POWDER, LYOPHILIZED, FOR SOLUTION INTRAVENOUS AS NEEDED
Status: DISCONTINUED | OUTPATIENT
Start: 2024-08-08 | End: 2024-08-08 | Stop reason: SURG

## 2024-08-08 RX ORDER — PANTOPRAZOLE SODIUM 40 MG/10ML
40 INJECTION, POWDER, LYOPHILIZED, FOR SOLUTION INTRAVENOUS ONCE
Status: COMPLETED | OUTPATIENT
Start: 2024-08-08 | End: 2024-08-08

## 2024-08-08 RX ORDER — DEXTROSE MONOHYDRATE 25 G/50ML
10-50 INJECTION, SOLUTION INTRAVENOUS
Status: DISCONTINUED | OUTPATIENT
Start: 2024-08-08 | End: 2024-08-08 | Stop reason: HOSPADM

## 2024-08-08 RX ORDER — CHLORHEXIDINE GLUCONATE ORAL RINSE 1.2 MG/ML
15 SOLUTION DENTAL ONCE
Status: DISCONTINUED | OUTPATIENT
Start: 2024-08-08 | End: 2024-08-08 | Stop reason: HOSPADM

## 2024-08-08 RX ORDER — NITROGLYCERIN 0.4 MG/1
0.4 TABLET SUBLINGUAL
Status: DISCONTINUED | OUTPATIENT
Start: 2024-08-08 | End: 2024-08-13 | Stop reason: HOSPADM

## 2024-08-08 RX ORDER — ACETAMINOPHEN 650 MG/1
650 SUPPOSITORY RECTAL EVERY 4 HOURS PRN
Status: DISCONTINUED | OUTPATIENT
Start: 2024-08-09 | End: 2024-08-13 | Stop reason: HOSPADM

## 2024-08-08 RX ORDER — NICOTINE POLACRILEX 4 MG
15 LOZENGE BUCCAL
Status: DISCONTINUED | OUTPATIENT
Start: 2024-08-08 | End: 2024-08-08 | Stop reason: HOSPADM

## 2024-08-08 RX ORDER — SODIUM CHLORIDE 9 MG/ML
INJECTION, SOLUTION INTRAVENOUS CONTINUOUS PRN
Status: DISCONTINUED | OUTPATIENT
Start: 2024-08-08 | End: 2024-08-08 | Stop reason: SURG

## 2024-08-08 RX ORDER — DEXMEDETOMIDINE HYDROCHLORIDE 4 UG/ML
.2-1.5 INJECTION, SOLUTION INTRAVENOUS
Status: DISCONTINUED | OUTPATIENT
Start: 2024-08-08 | End: 2024-08-09

## 2024-08-08 RX ORDER — DEXTROSE MONOHYDRATE 25 G/50ML
10-50 INJECTION, SOLUTION INTRAVENOUS
Status: ACTIVE | OUTPATIENT
Start: 2024-08-08 | End: 2024-08-11

## 2024-08-08 RX ORDER — MORPHINE SULFATE 2 MG/ML
2 INJECTION, SOLUTION INTRAMUSCULAR; INTRAVENOUS
Status: DISPENSED | OUTPATIENT
Start: 2024-08-08 | End: 2024-08-11

## 2024-08-08 RX ORDER — HYDROCODONE BITARTRATE AND ACETAMINOPHEN 5; 325 MG/1; MG/1
1 TABLET ORAL EVERY 4 HOURS PRN
Status: DISCONTINUED | OUTPATIENT
Start: 2024-08-08 | End: 2024-08-13 | Stop reason: HOSPADM

## 2024-08-08 RX ORDER — SODIUM CHLORIDE 9 MG/ML
40 INJECTION, SOLUTION INTRAVENOUS AS NEEDED
Status: DISCONTINUED | OUTPATIENT
Start: 2024-08-08 | End: 2024-08-08 | Stop reason: HOSPADM

## 2024-08-08 RX ORDER — MIDAZOLAM HYDROCHLORIDE 1 MG/ML
INJECTION INTRAMUSCULAR; INTRAVENOUS AS NEEDED
Status: DISCONTINUED | OUTPATIENT
Start: 2024-08-08 | End: 2024-08-08 | Stop reason: SURG

## 2024-08-08 RX ORDER — ACETAMINOPHEN 325 MG/1
650 TABLET ORAL EVERY 4 HOURS PRN
Status: DISCONTINUED | OUTPATIENT
Start: 2024-08-09 | End: 2024-08-13 | Stop reason: HOSPADM

## 2024-08-08 RX ORDER — ATORVASTATIN CALCIUM 40 MG/1
40 TABLET, FILM COATED ORAL NIGHTLY
Status: DISCONTINUED | OUTPATIENT
Start: 2024-08-09 | End: 2024-08-13 | Stop reason: HOSPADM

## 2024-08-08 RX ORDER — NALOXONE HCL 0.4 MG/ML
0.4 VIAL (ML) INJECTION
Status: DISCONTINUED | OUTPATIENT
Start: 2024-08-08 | End: 2024-08-13 | Stop reason: HOSPADM

## 2024-08-08 RX ORDER — POLYETHYLENE GLYCOL 3350 17 G/17G
17 POWDER, FOR SOLUTION ORAL 2 TIMES DAILY
Status: DISCONTINUED | OUTPATIENT
Start: 2024-08-08 | End: 2024-08-13 | Stop reason: HOSPADM

## 2024-08-08 RX ADMIN — HEPARIN SODIUM 15000 UNITS: 1000 INJECTION INTRAVENOUS; SUBCUTANEOUS at 09:14

## 2024-08-08 RX ADMIN — CHLORHEXIDINE GLUCONATE, 0.12% ORAL RINSE 15 ML: 1.2 SOLUTION DENTAL at 21:41

## 2024-08-08 RX ADMIN — VECURONIUM BROMIDE 10 MG: 10 INJECTION, POWDER, LYOPHILIZED, FOR SOLUTION INTRAVENOUS at 07:30

## 2024-08-08 RX ADMIN — ALBUMIN (HUMAN) 12.5 G: 12.5 INJECTION, SOLUTION INTRAVENOUS at 23:45

## 2024-08-08 RX ADMIN — MIDAZOLAM 5 MG: 1 INJECTION INTRAMUSCULAR; INTRAVENOUS at 06:58

## 2024-08-08 RX ADMIN — HYDRALAZINE HYDROCHLORIDE 20 MG: 20 INJECTION INTRAMUSCULAR; INTRAVENOUS at 19:51

## 2024-08-08 RX ADMIN — CEFAZOLIN 2 G: 2 INJECTION, POWDER, LYOPHILIZED, FOR SOLUTION INTRAVENOUS at 07:38

## 2024-08-08 RX ADMIN — Medication 12.5 MG: at 06:06

## 2024-08-08 RX ADMIN — HEPARIN SODIUM 5000 UNITS: 1000 INJECTION INTRAVENOUS; SUBCUTANEOUS at 10:04

## 2024-08-08 RX ADMIN — INSULIN HUMAN 3 UNITS/HR: 1 INJECTION, SOLUTION INTRAVENOUS at 07:55

## 2024-08-08 RX ADMIN — FENTANYL CITRATE 500 MCG: 50 INJECTION, SOLUTION INTRAMUSCULAR; INTRAVENOUS at 07:55

## 2024-08-08 RX ADMIN — PANTOPRAZOLE SODIUM 40 MG: 40 INJECTION, POWDER, FOR SOLUTION INTRAVENOUS at 17:05

## 2024-08-08 RX ADMIN — FENTANYL CITRATE 250 MCG: 50 INJECTION, SOLUTION INTRAMUSCULAR; INTRAVENOUS at 09:01

## 2024-08-08 RX ADMIN — NICARDIPINE HYDROCHLORIDE 1 MG: 25 INJECTION INTRAVENOUS at 08:10

## 2024-08-08 RX ADMIN — SODIUM CHLORIDE 2 G: 900 INJECTION INTRAVENOUS at 17:48

## 2024-08-08 RX ADMIN — PHENYLEPHRINE HYDROCHLORIDE 150 MCG: 10 INJECTION INTRAVENOUS at 08:16

## 2024-08-08 RX ADMIN — MORPHINE SULFATE 2 MG: 2 INJECTION, SOLUTION INTRAMUSCULAR; INTRAVENOUS at 20:18

## 2024-08-08 RX ADMIN — VECURONIUM BROMIDE 6 MG: 10 INJECTION, POWDER, LYOPHILIZED, FOR SOLUTION INTRAVENOUS at 08:58

## 2024-08-08 RX ADMIN — VECURONIUM BROMIDE 4 MG: 10 INJECTION, POWDER, LYOPHILIZED, FOR SOLUTION INTRAVENOUS at 08:19

## 2024-08-08 RX ADMIN — MIDAZOLAM 5 MG: 1 INJECTION INTRAMUSCULAR; INTRAVENOUS at 07:30

## 2024-08-08 RX ADMIN — SENNOSIDES AND DOCUSATE SODIUM 2 TABLET: 50; 8.6 TABLET ORAL at 21:41

## 2024-08-08 RX ADMIN — MAGNESIUM SULFATE IN DEXTROSE 1 G: 10 INJECTION, SOLUTION INTRAVENOUS at 19:01

## 2024-08-08 RX ADMIN — Medication 25 MG: at 07:30

## 2024-08-08 RX ADMIN — VECURONIUM BROMIDE 2 MG: 10 INJECTION, POWDER, LYOPHILIZED, FOR SOLUTION INTRAVENOUS at 10:09

## 2024-08-08 RX ADMIN — ACETAMINOPHEN 1000 MG: 1000 INJECTION INTRAVENOUS at 17:30

## 2024-08-08 RX ADMIN — ALBUMIN (HUMAN) 12.5 G: 12.5 INJECTION, SOLUTION INTRAVENOUS at 14:29

## 2024-08-08 RX ADMIN — CALCIUM GLUCONATE 2000 MG: 20 INJECTION, SOLUTION INTRAVENOUS at 15:31

## 2024-08-08 RX ADMIN — FENTANYL CITRATE 250 MCG: 50 INJECTION, SOLUTION INTRAMUSCULAR; INTRAVENOUS at 11:01

## 2024-08-08 RX ADMIN — PHENYLEPHRINE HYDROCHLORIDE 100 MCG: 10 INJECTION INTRAVENOUS at 09:46

## 2024-08-08 RX ADMIN — ACETAMINOPHEN 1000 MG: 1000 INJECTION INTRAVENOUS at 10:44

## 2024-08-08 RX ADMIN — ASPIRIN 325 MG ORAL TABLET 325 MG: 325 PILL ORAL at 17:05

## 2024-08-08 RX ADMIN — HYDROCODONE BITARTRATE AND ACETAMINOPHEN 1 TABLET: 5; 325 TABLET ORAL at 13:38

## 2024-08-08 RX ADMIN — EPHEDRINE SULFATE 10 MG: 5 INJECTION INTRAVENOUS at 07:37

## 2024-08-08 RX ADMIN — PROTAMINE SULFATE 200 MG: 10 INJECTION, SOLUTION INTRAVENOUS at 10:36

## 2024-08-08 RX ADMIN — Medication 0.02 MCG/KG/MIN: at 10:46

## 2024-08-08 RX ADMIN — Medication 25 MG: at 06:58

## 2024-08-08 RX ADMIN — HYDROCODONE BITARTRATE AND ACETAMINOPHEN 1 TABLET: 5; 325 TABLET ORAL at 19:37

## 2024-08-08 RX ADMIN — VECURONIUM BROMIDE 3 MG: 10 INJECTION, POWDER, LYOPHILIZED, FOR SOLUTION INTRAVENOUS at 09:32

## 2024-08-08 RX ADMIN — ONDANSETRON 4 MG: 2 INJECTION INTRAMUSCULAR; INTRAVENOUS at 10:44

## 2024-08-08 RX ADMIN — POLYETHYLENE GLYCOL 3350 17 G: 17 POWDER, FOR SOLUTION ORAL at 21:41

## 2024-08-08 RX ADMIN — SODIUM CHLORIDE: 9 INJECTION, SOLUTION INTRAVENOUS at 06:52

## 2024-08-08 RX ADMIN — MORPHINE SULFATE 2 MG: 2 INJECTION, SOLUTION INTRAMUSCULAR; INTRAVENOUS at 18:31

## 2024-08-08 RX ADMIN — MUPIROCIN 1 APPLICATION: 20 OINTMENT TOPICAL at 21:41

## 2024-08-08 RX ADMIN — ONDANSETRON 4 MG: 2 INJECTION INTRAMUSCULAR; INTRAVENOUS at 19:40

## 2024-08-08 RX ADMIN — CEFAZOLIN 2 G: 2 INJECTION, POWDER, LYOPHILIZED, FOR SOLUTION INTRAVENOUS at 10:38

## 2024-08-08 RX ADMIN — DEXMEDETOMIDINE HYDROCHLORIDE 0.5 MCG/KG/HR: 100 INJECTION, SOLUTION INTRAVENOUS at 06:58

## 2024-08-08 RX ADMIN — ALBUMIN (HUMAN) 12.5 G: 12.5 INJECTION, SOLUTION INTRAVENOUS at 21:41

## 2024-08-08 RX ADMIN — FENTANYL CITRATE 250 MCG: 50 INJECTION, SOLUTION INTRAMUSCULAR; INTRAVENOUS at 07:30

## 2024-08-08 NOTE — ANESTHESIA PROCEDURE NOTES
Arterial Line      Patient reassessed immediately prior to procedure    Start time: 8/8/2024 6:56 AM  Stop Time:8/8/2024 7:01 AM       Line placed for hemodynamic monitoring and ABGs/Labs/ISTAT.  Performed By   Anesthesiologist: Mitchel Agustin MD   Preanesthetic Checklist  Completed: patient identified, IV checked, site marked, risks and benefits discussed, surgical consent, monitors and equipment checked, pre-op evaluation and timeout performed  Arterial Line Prep    Sterile Tech: cap, gloves, sterile barriers, gown and mask  Prep: ChloraPrep  Patient monitoring: EKG, continuous pulse oximetry and blood pressure monitoring  Arterial Line Procedure   Laterality:left  Location:  radial artery  Catheter size: 20 G   Guidance: ultrasound guided and palpation technique  Number of attempts: 1  Successful placement: yes Images: still images not obtained  Post Assessment   Dressing Type: wrist guard applied, secured with tape and occlusive dressing applied.   Complications no  Circ/Move/Sens Assessment: normal and unchanged.   Patient Tolerance: patient tolerated the procedure well with no apparent complications  Additional Notes  Sterile seldinger technique, tolerated well

## 2024-08-08 NOTE — ANESTHESIA PROCEDURE NOTES
Central Line      Patient reassessed immediately prior to procedure    Start time: 8/8/2024 7:05 AM  Stop Time:8/8/2024 7:21 AM  Staff  Anesthesiologist: Mitchel Agustin MD  Preanesthetic Checklist  Completed: patient identified, IV checked, site marked, risks and benefits discussed, surgical consent, monitors and equipment checked, pre-op evaluation and timeout performed  Central Line Prep  Sterile Tech:gloves, cap, gown, mask and sterile barriers  Prep: chloraprep  Patient monitoring: blood pressure monitoring, continuous pulse oximetry and EKG  Central Line Procedure  Laterality:right  Location:internal jugular  Catheter Type:Cordis  Catheter Size:9 Fr  Guidance:ultrasound guided, landmark technique and palpation technique  PROCEDURE NOTE/ULTRASOUND INTERPRETATION.  Using ultrasound guidance the potential vascular sites for insertion of the catheter were visualized to determine the patency of the vessel to be used for vascular access.  After selecting the appropriate site for insertion, the needle was visualized under ultrasound being inserted into the internal jugular vein, followed by ultrasound confirmation of wire and catheter placement. There were no abnormalities seen on ultrasound; an image was taken; and the patient tolerated the procedure with no complications. Images: still images not obtained  Assessment  Post procedure:biopatch applied, line sutured and occlusive dressing applied  Assessement:blood return through all ports, free fluid flow, chest x-ray ordered and Tee Test  Complications:no  Patient Tolerance:patient tolerated the procedure well with no apparent complications  Additional Notes  Sterile prep, drape, gown and gloves.  Negative tee negative carotid, no difficulties.  Tolerated well.

## 2024-08-08 NOTE — CONSULTS
Cardiology Consult Note      REQUESTING PHYSICIAN    Salvatore Domingo, *    PATIENT IDENTIFICATION  Name: Heladio Clark  Age: 54 y.o.  Sex: male  :  1970  MRN: [id number]             REASON FOR CONSULTATION:  54-year-old male who was evaluated in our office by Dr. John for abnormal coronary calcium scan: total score 473.7: 281.7 LAD, 139.2 Lcx, and 52.8 RCA. He was also noted to have a 4.4 cm mid ascending thoracic aneurysm.  Exercise myocardial perfusion study was performed with hypertensive response and nondiagnostic EKG changes with upsloping ST segment depression with exercise.  SPECT imaging showed small apical perfusion defect at rest and peak stress with small-moderate-sized severe intensity perfusion defect and small reversible ischemia.  Heart catheterization was subsequently performed 24:    Impressions:   Severe multivessel coronary artery disease  Severe stenosis involving the mid LAD after the second diagonal branch involving the ostium of the diagonal branch  Severe stenosis involving a moderate-sized first diagonal branch and moderate-sized second diagonal branch  Severe stenosis involving the marginal branch of left circumflex artery  Severe stenosis involving the nondominant right coronary artery  Normal LV systolic function normal wall motion    Cardiothoracic surgery team was consulted and patient was deemed an appropriate candidate for surgical revascularization.  CTA prior to surgery identified ascending aortic aneurysm at only 4.2 cm.    CC:  Severe multivessel coronary artery disease    HISTORY OF PRESENT ILLNESS:   Patient presented to [facility] for elective coronary artery bypass grafting.  He is seen immediately postop and remains intubated/sedated.  Nursing at bedside.  Rhythm paced, underlying sinus bradycardia as low as 38.  Drips: Dex 0.5 mcg/kg/h.  Blood pressure 97/61      REVIEW OF SYSTEMS:  Review of systems could not be obtained due to    patient sedation status.    OBJECTIVE   Diagnostics reviewed and unremarkable    ASSESSMENT  Severe multivessel coronary artery disease status post CABG  Ascending aortic aneurysm-4.2 cm per CTA 8/5/2024  Mixed hyperlipidemia  Obstructive sleep apnea  Significant paternal family history of premature coronary disease  Hyperglycemia    PLAN  Continue current CTS postop protocol  Extubate as tolerated  Monitor underlying rhythm closely-currently sinus bradycardia  Continue CV supportive care  Monitor and replete electrolytes, monitor hemodynamics closely      CHF Guideline Directed Medical Therapy  Beta Blocker:   ARNI/ACE/ARB:   SGLT 2 inhibitors:   Diuretics:   Aldosterone Antagonist:   Vasodilators & Nitrates:     Vital Signs  Visit Vitals  /84 (BP Location: Right arm, Patient Position: Lying)   Pulse 80   Temp 93.6 °F (34.2 °C)   Resp 12   Wt 105 kg (231 lb 6.4 oz)   SpO2 93%   BMI 34.17 kg/m²     Oxygen Therapy  SpO2: 93 %  Pulse Oximetry Type: Continuous  Device (Oxygen Therapy): ventilator  Oxygen Concentration (%): 70  Flowsheet Rows      Flowsheet Row First Filed Value   Admission Height --   Admission Weight 105 kg (231 lb 6.4 oz) Documented at 08/08/2024 0500          Intake & Output (last 3 days)         08/05 0701  08/06 0700 08/06 0701  08/07 0700 08/07 0701  08/08 0700 08/08 0701  08/09 0700    I.V. (mL/kg)    1500 (14.3)    Blood    135    Total Intake(mL/kg)    1635 (15.6)    Urine (mL/kg/hr)    640 (1.1)    Blood    150    Chest Tube    25    Total Output    815    Net    +820                  Lines, Drains & Airways       Active LDAs       Name Placement date Placement time Site Days    Pulmonary Artery Catheter - Triple Lumen 08/08/24 Right Internal jugular 08/08/24  0843  created via procedure documentation  -- less than 1    CVC Double Lumen 08/08/24 Right Internal jugular 08/08/24  0705  created via procedure documentation  Internal jugular  less than 1    Peripheral IV 08/08/24  Distal;Posterior;Right Forearm 08/08/24  --  Forearm  less than 1    Chest Tube Mediastinal 08/08/24  --  Mediastinal  less than 1    Urethral Catheter Temperature probe 16 Fr. 08/08/24  --  -- less than 1    Y Chest Tube 1 and 2 Right Pleural 28 Fr. Left Pleural 28 Fr. 08/08/24  --  -- less than 1    ETT  08/08/24  0733  created via procedure documentation  -- less than 1    Arterial Line 08/08/24 Left Radial 08/08/24  0656  created via procedure documentation  Radial  less than 1    Pacer Wires --  --  Atrial and Ventricular  --                    MEDICAL HISTORY    Past Medical History:   Diagnosis Date    Hyperlipidemia         SURGICAL HISTORY    Past Surgical History:   Procedure Laterality Date    CARDIAC CATHETERIZATION N/A 8/2/2024    Procedure: Left Heart Cath possible PCI;  Surgeon: Wilmer John MD;  Location: [facility] CATH INVASIVE LOC[facility];  Service: Cardiovascular;  Laterality: N/A;    TONSILLECTOMY          FAMILY HISTORY    Family History   Problem Relation Age of Onset    Heart disease Father        SOCIAL HISTORY    Social History     Tobacco Use    Smoking status: Never    Smokeless tobacco: Never   Substance Use Topics    Alcohol use: Yes     Comment: socially        ALLERGIES    Allergies   Allergen Reactions    Nuts Anaphylaxis    Penicillins Hives              /84 (BP Location: Right arm, Patient Position: Lying)   Pulse 80   Temp 93.6 °F (34.2 °C)   Resp 12   Wt 105 kg (231 lb 6.4 oz)   SpO2 93%   BMI 34.17 kg/m²   Intake/Output last 3 shifts:  No intake/output data recorded.  Intake/Output this shift:  I/O this shift:  In: 1635 [I.V.:1500; Blood:135]  Out: 815 [Urine:640; Blood:150; Chest Tube:25]    PHYSICAL EXAM:    General: Well-developed, well-nourished 54-year-old male who is sedated and intubated postoperatively following CABG, appears stated age  Head:  Normocephalic, atraumatic, mucous membranes moist, ETT tube noted  Eyes:  Conjunctivae/corneas clear, pupils  equal and reactive  Neck:  IJ right  Lungs: Clear and mechanical to auscultation bilaterally, no wheezes, rhonchi or rales are noted  Chest wall: Dressing dry and intact  Heart::  Regular rate and rhythm, S1 and S2 normal, no murmur, rub or gallop  Abdomen: Soft, nontender, nondistended, bowel sounds active  Extremities: No cyanosis, clubbing, or edema   Pulses: 2+ and symmetric all extremities  Skin:  No rashes or lesions  Neuro/psych: Sedated      Scheduled Meds:      acetaminophen, 1,000 mg, Intravenous, Q8H  [START ON 8/9/2024] aspirin, 81 mg, Oral, Daily  aspirin, 325 mg, Oral, Once  [START ON 8/9/2024] atorvastatin, 40 mg, Oral, Nightly  ceFAZolin, 2 g, Intravenous, Q8H  chlorhexidine, 15 mL, Mouth/Throat, Q12H  [START ON 8/9/2024] enoxaparin, 40 mg, Subcutaneous, Daily  magnesium sulfate, 1 g, Intravenous, Q8H  mupirocin, , Each Nare, BID  [START ON 8/9/2024] pantoprazole, 40 mg, Oral, Q AM  pantoprazole, 40 mg, Intravenous, Once  polyethylene glycol, 17 g, Oral, BID  senna-docusate sodium, 2 tablet, Oral, BID        Continuous Infusions:    dexmedetomidine, 0.2-1.5 mcg/kg/hr  insulin, 0-100 Units/hr  niCARdipine, 5-15 mg/hr  nitroglycerin, 5-50 mcg/min  norepinephrine, 0.02-0.06 mcg/kg/min  sodium chloride, 30 mL/hr        PRN Meds:      [START ON 8/9/2024] acetaminophen **OR** [START ON 8/9/2024] acetaminophen **OR** [START ON 8/9/2024] acetaminophen    albumin human    Calcium Replacement - Follow Nurse / BPA Driven Protocol    dextrose    dextrose    glucagon (human recombinant)    HYDROcodone-acetaminophen    Magnesium Cardiology Dose Replacement - Follow Nurse / BPA Driven Protocol    meperidine    Morphine **AND** naloxone    niCARdipine    nitroglycerin    nitroglycerin    norepinephrine    ondansetron    Phosphorus Replacement - Follow Nurse / BPA Driven Protocol    Potassium Replacement - Follow Nurse / BPA Driven Protocol    vasopressin        Results Review:     I reviewed the patient's new  clinical results.    CBC    Results from last 7 days   Lab Units 08/08/24  1219 08/08/24  1145 08/08/24  1051 08/08/24  1000 08/08/24  0921 08/08/24  0749 08/05/24  0822   WBC 10*3/mm3  --  14.12*  --   --   --   --  10.11   HEMOGLOBIN g/dL  --  12.6*  --   --   --   --  13.4   HEMOGLOBIN, POC g/dL 12.6  --  12.2 12.2 11.9* 12.2  --    PLATELETS 10*3/mm3  --  214  --   --   --   --  268     Cr Clearance Estimated Creatinine Clearance: 132.4 mL/min (by C-G formula based on SCr of 0.77 mg/dL).  Coag   Results from last 7 days   Lab Units 08/08/24  1145 08/05/24  0822   INR  1.10 0.98   APTT seconds 31.9* 32.4*     HbA1C   Lab Results   Component Value Date    HGBA1C 6.23 (H) 08/05/2024     Blood Glucose   Glucose   Date/Time Value Ref Range Status   08/08/2024 1219 138 (H) 74 - 100 mg/dL Final   08/08/2024 1219 138 (H) 74 - 100 mg/dL Final     Comment:     Serial Number: [id number]:  [id number]   08/08/2024 1051 114 (H) 70 - 105 mg/dL Final     Comment:     Serial Number: [id number]:  [id number]   08/08/2024 1000 138 (H) 70 - 105 mg/dL Final     Comment:     Serial Number: [id number]:  [id number]   08/08/2024 0921 155 (H) 70 - 105 mg/dL Final     Comment:     Serial Number: [id number]:  [id number]   08/08/2024 0749 133 (H) 70 - 105 mg/dL Final     Comment:     Serial Number: [id number]:  [id number]   08/08/2024 0603 119 (H) 70 - 105 mg/dL Final     Comment:     Serial Number: [id number]:  [id number]     Infection     CMP   Results from last 7 days   Lab Units 08/08/24  1219 08/08/24  1145 08/05/24  0902 08/05/24  0822   SODIUM mmol/L  --  138  --  140   POTASSIUM mmol/L  --  4.1  --  3.7   CHLORIDE mmol/L  --  108*  --  106   CO2 mmol/L  --  24.2  --  24.0   BUN mg/dL  --  9  --  10   CREATININE mg/dL 0.77 0.79 1.00 0.90   GLUCOSE mg/dL  --  129*  --  124*   ALBUMIN g/dL  --  3.4*  --  3.9   BILIRUBIN mg/dL  --   --   --  0.4   ALK PHOS U/L  --   --   --  92   AST (SGOT) U/L  --   --   --  16   ALT (SGPT) U/L  --    "--   --  22     ABG    Results from last 7 days   Lab Units 08/08/24  1219 08/08/24  1051 08/08/24  1000 08/08/24  0921 08/08/24  0749 08/05/24  0816   PH, ARTERIAL pH units 7.326* 7.230* 7.230* 7.210* 7.300* 7.430   PCO2, ARTERIAL mm Hg 43.2 59.7* 61.6* 67.9* 50.7* 39.4   PO2 ART mm Hg 86.9 96.0 207.0* 64.0* 354.0* 73.1*   O2 SATURATION ART % 95.8 96.0 100.0* 86.0* 100.0* 94.9   BASE EXCESS ART mmol/L -3.4* <0.0* <0.0* <0.0* <0.0* 1.8     UA    Results from last 7 days   Lab Units 08/05/24  0822   NITRITE UA  Negative   WBC UA /HPF 3-5*   BACTERIA UA /HPF None Seen   SQUAM EPITHEL UA /HPF 0-2     VERN  No results found for: \"POCMETH\", \"POCAMPHET\", \"POCBARBITUR\", \"POCBENZO\", \"POCCOCAINE\", \"POCOPIATES\", \"POCOXYCODO\", \"POCPHENCYC\", \"POCPROPOXY\", \"POCTHC\", \"POCTRICYC\"  Lysis Labs   Results from last 7 days   Lab Units 08/08/24  1219 08/08/24  1145 08/08/24  1051 08/08/24  1000 08/08/24  0921 08/08/24  0749 08/05/24  0902 08/05/24  0822   INR   --  1.10  --   --   --   --   --  0.98   APTT seconds  --  31.9*  --   --   --   --   --  32.4*   HEMOGLOBIN g/dL  --  12.6*  --   --   --   --   --  13.4   HEMOGLOBIN, POC g/dL 12.6  --  12.2 12.2 11.9* 12.2  --   --    PLATELETS 10*3/mm3  --  214  --   --   --   --   --  268   CREATININE mg/dL 0.77 0.79  --   --   --   --  1.00 0.90     Radiology(recent) XR Abdomen KUB    Result Date: 8/8/2024  Impression: Orogastric tube tip in the distal stomach. Electronically Signed: Suyapa Fitzgerald MD  8/8/2024 12:20 PM EDT  Workstation ID: [id number]    XR Chest 1 View    Result Date: 8/8/2024  Impression: 1. Support line and tube placements as described. No visible pneumothorax. 2. Bibasilar linear subsegmental atelectasis. Electronically Signed: Roxanne Schwarz MD  8/8/2024 12:20 PM EDT  Workstation ID: [id number]             X-rays, labs reviewed personally by physician.    ECG/EMG Results (most recent)       None              Medication Review:   I have reviewed the patient's current " medication list  Scheduled Meds:acetaminophen, 1,000 mg, Intravenous, Q8H  [START ON 8/9/2024] aspirin, 81 mg, Oral, Daily  aspirin, 325 mg, Oral, Once  [START ON 8/9/2024] atorvastatin, 40 mg, Oral, Nightly  ceFAZolin, 2 g, Intravenous, Q8H  chlorhexidine, 15 mL, Mouth/Throat, Q12H  [START ON 8/9/2024] enoxaparin, 40 mg, Subcutaneous, Daily  magnesium sulfate, 1 g, Intravenous, Q8H  mupirocin, , Each Nare, BID  [START ON 8/9/2024] pantoprazole, 40 mg, Oral, Q AM  pantoprazole, 40 mg, Intravenous, Once  polyethylene glycol, 17 g, Oral, BID  senna-docusate sodium, 2 tablet, Oral, BID      Continuous Infusions:dexmedetomidine, 0.2-1.5 mcg/kg/hr  insulin, 0-100 Units/hr  niCARdipine, 5-15 mg/hr  nitroglycerin, 5-50 mcg/min  norepinephrine, 0.02-0.06 mcg/kg/min  sodium chloride, 30 mL/hr      PRN Meds:.  [START ON 8/9/2024] acetaminophen **OR** [START ON 8/9/2024] acetaminophen **OR** [START ON 8/9/2024] acetaminophen    albumin human    Calcium Replacement - Follow Nurse / BPA Driven Protocol    dextrose    dextrose    glucagon (human recombinant)    HYDROcodone-acetaminophen    Magnesium Cardiology Dose Replacement - Follow Nurse / BPA Driven Protocol    meperidine    Morphine **AND** naloxone    niCARdipine    nitroglycerin    nitroglycerin    norepinephrine    ondansetron    Phosphorus Replacement - Follow Nurse / BPA Driven Protocol    Potassium Replacement - Follow Nurse / BPA Driven Protocol    vasopressin    Imaging:  Imaging Results (Last 72 Hours)       Procedure Component Value Units Date/Time    XR Abdomen KUB [[id number]] Collected: 08/08/24 1219     Updated: 08/08/24 1222    Narrative:      XR ABDOMEN KUB    Date of Exam: 8/8/2024 12:00 PM EDT    Indication: OG placement verification    Comparison: None available.    Findings:  Single supine image. There is considerable artifact over the abdomen. There is an orogastric tube with its tip in the distal stomach. Visualized bowel is nondilated.      Impression:  "     Impression:  Orogastric tube tip in the distal stomach.      Electronically Signed: Suyapa Fitzgerald MD    8/8/2024 12:20 PM EDT    Workstation ID: [id number]    XR Chest 1 View [[id number]] Collected: 08/08/24 1219     Updated: 08/08/24 1222    Narrative:      XR CHEST 1 VW    Date of Exam: 8/8/2024 12:00 PM EDT    Indication: Post-Op Check Line & Tube Placement    Comparison: CT chest 8/5/2024, PA and lateral chest 8/5/2024    Findings:  Median sternotomy changes are present. Heart size is normal. Linear subsegmental atelectasis is seen in a bibasilar distribution. No pleural effusion or pneumothorax is identified. Right presumed bibasilar chest tubes projecting over the diaphragmatic   margins.. NG tube extends below the diaphragm with the tip not included in the field-of-view. Right IJ approach [place]-Nia catheter tip extends to the main pulmonary artery level. ET tube tip projects approximately 3.8 cm above the level of the estelita.      Impression:      Impression:    1. Support line and tube placements as described. No visible pneumothorax.  2. Bibasilar linear subsegmental atelectasis.      Electronically Signed: Roxanne Schwarz MD    8/8/2024 12:20 PM EDT    Workstation ID: [id number]              ALEXANDRIA Mcwilliams  08/08/24  12:32 EDT       EMR Dragon/Transcription:   \"Dictated utilizing Dragon dictation\".                 Electronically signed by ALEXANDRIA Mcwilliams, 08/08/24, 12:32 PM EDT.  Copied text in this note has been reviewed by me and is accurate as of 08/08/24.    Cardiology attending:  Seen and examined.  Chart and labs reviewed. Independent interpretations of cardiac testing was performed. History and exam findings are verified with above changes noted.  Assessment and plan notated by APC after being formulated by attending consultant.  Note that greater than 50% of the time spent in care of the patient was provided by attending consultant.    Patient remains on the ventilator and is " waking up from sedation.  Plans to extubate later this evening.  Heart rate is sinus in 60s underlying paced rhythm currently.  Patient underwent three-vessel CABG earlier today.  We will continue to monitor patient's rhythm and rate.  Monitor renal function and electrolytes closely.  Extubate as per protocols.    Physical Exam:    General: Alert, cooperative, on ventilator  Head:  Normocephalic, atraumatic, mucous membranes moist.  Endotracheal tube noted  Eyes:  Conjunctivae/corneas clear, EOM's intact     Neck:  Supple,  no bruit  Lungs:           Course of mechanical  Chest wall: No tenderness  Heart::  Regular rate and rhythm, S1 and S2 normal, 1/6 holosystolic murmur.  No rub or gallop  Abdomen: Soft, nontender, nondistended bowel sounds active  Extremities: No cyanosis, clubbing, or edema  Pulses:  2+ and symmetric all extremities  Skin:  No rashes or lesions  Neuro/psych: Alert on ventilator.

## 2024-08-08 NOTE — ANESTHESIA PROCEDURE NOTES
Intra-Op Anesthesia JELANI    Procedure Performed: Intra-Op Anesthesia JELANI       Start Time:        End Time:      Preanesthesia Checklist:  Patient identified, IV assessed, risks and benefits discussed, monitors and equipment assessed, procedure being performed at surgeon's request and anesthesia consent obtained.    General Procedure Information  Diagnostic Indications for Echo:  assessment of surgical repair and hemodynamic monitoring  Location performed:  OR  Intubated  Bite block placed  Heart visualized  Probe Insertion:  Easy  Probe Type:  Biplane and multiplane  Modalities:  Color flow mapping, pulse wave Doppler and continuous wave Doppler    Echocardiographic and Doppler Measurements    Ventricles    Right Ventricle:  Cavity size normal.  Hypertrophy not present.  Thrombus not present.  Global function normal.  Ejection Fraction 60%.    Left Ventricle:  Cavity size normal.  Thrombus not present.  Global Function normal.  Ejection Fraction 60%.      Ventricular Regional Function:  1- Basal Anteroseptal:  normal  2- Basal Anterior:  normal  3- Basal Anterolateral:  normal  4- Basal Inferolateral:  normal  5- Basal Inferior:  normal  6- Basal Inferoseptal:  normal  7- Mid Anteroseptal:  normal  8- Mid Anterior:  normal  9- Mid Anterolateral:  normal  10- Mid Inferolateral:  normal  11- Mid Inferior:  normal  12- Mid Inferoseptal:  normal  13- Apical Anterior:  normal  14- Apical Lateral:  normal  15- Apical Inferior:  normal  16- Apical Septal:  normal  17- [place]:  normal      Valves    Aortic Valve:  Annulus normal.  Stenosis not present.  Regurgitation mild.  Leaflets normal.  Leaflet motions restricted.      Mitral Valve:  Annulus normal.  Stenosis not present.  Regurgitation absent.  Leaflets normal.  Leaflet motions normal.      Tricuspid Valve:  Annulus normal.  Stenosis not present.  Regurgitation absent.  Leaflets normal.  Leaflet motions normal.    Pulmonic Valve:  Annulus normal.  Stenosis not present.   Regurgitation absent.        Aorta    Ascending Aorta:  Size dilated.  Diameter 4.2 cm.  Dissection not present.  Plaque thickness less than 3 mm.  Mobile plaque not present.    Aortic Arch:  Size normal.  Dissection not present.  Plaque thickness less than 3 mm.  Mobile plaque not present.    Descending Aorta:  Size normal.  Dissection not present.  Plaque thickness less than 3 mm.  Mobile plaque not present.        Atria    Right Atrium:  Size normal.    Left Atrium:  Size normal.  Spontaneous echo contrast not present.  Thrombus not present.  Tumor not present.  Device not present.    Left atrial appendage normal.      Septa        Ventricular Septum:  Intra-ventricular septum morphology normal.          Other Findings  Pleural Effusion:  none  Pulmonary Arteries:  normal      Anesthesia Information  Performed Personally  Anesthesiologist:  Mitchel Agustin MD      Echocardiogram Comments:       JELANI placed without difficulty , lubricated , bite guard       Pre CABG JELANI  LV no WMA EF 60  RV nl SF   MV wnl  AV Mild AI, effacement of STJ asc aorta 4.2cm , nl caliber arch   TV trace Tr       S/p cabg   No change

## 2024-08-08 NOTE — OP NOTE
Operative Note    Date of Dictation: 08/08/24    Date of Procedure: 08/08/24    Referring Physician: Salvatore Domingo,*    Preoperative diagnosis: Multivessel CAD    Postoperative diagnosis: Same    Procedure:   CABG x 3 (LIMA to LAD, KIMO to Diagonal, KIMO to OM) off pump. Total arterial an aortic off pump CABG.    Surgeon: Salvatore Domingo MD     Assistants: ALICIA Gerber was responsible for performing the following activities: Cardiac Surgery First assist, Endoscopic Vein [place] for CABG, surgical wound closure and their skilled assistance was necessary for the success of this case.     Anesthesia: General endotracheal anesthesia and JELANI    Findings:  The coronaries had a diameter of 1.5 mm and were of good quality. The ascending aorta measures 42mm, so I decided to do the surgery off pump without any proximals in the ascending aorta to decrease the risk of dissection of complications related to the aorta.     Estimated Blood Loss:  100 mL of Cell Saver returned.    STS Data: The STS Risk score discussed with the patient and family.  Counseling was done regarding abuse of tobacco, alcohol and drugs as needed. They understand and wish to proceed. The antibiotics and b blockers were given in the STS required window.          Description of the procedure:     The patient was placed supine on the operative table. General anesthesia was given and lines placed. The patient was prepped and draped using the usual sterile technique. A median sternotomy was performed with a scalpel and the layers carried down to the sternum using the electrocautery. The sternum was split in the midline using a vertical oscillating saw. Hemostasis was achieved. The LIMA and KIMO were harvested skeletonized and prepared with papaverine. The KIMO was used as a free graft. It was of good quality. 300 units/kg of IV heparin was given to an ACT over 400. A Favaloro retractor was placed and the mediastinum exposed, the pericardium  was opened and the edges tacked to the wound. The free KIMO was sewn to the in situ LIMA as a T graft with 7-0 Prolene. The LIMA was sewn to the distal LAD with 7-0 Prolene off pump using a Maquet device. The KIMO was sewn to the first diagonal artery with 7-0 Prolene. The KIMO was sewn to the second obtuse marginal of the circunflex artery with 7-0 Prolene. All anastomoses were checked and had good flow and morphology. The left and right pleural space was suctioned. The matching dose of protamine was given. AV temporary wires and pleural and mediastinal chest tubes were placed and the wound sprayed with platelet rich plasma. The sternum was closed with single and double wires and soft tissue in layers of reabsorbable material. The wounds were covered with sterile dressings.       Specimen removed:  none    Complications:  none           Disposition: Cardiovascular recovery room           Condition: Critical but stable.

## 2024-08-08 NOTE — ANESTHESIA PROCEDURE NOTES
Central Line      Patient reassessed immediately prior to procedure    Staff  Anesthesiologist: Mitchel Agustin MD  Preanesthetic Checklist  Completed: patient identified, IV checked, site marked, risks and benefits discussed, surgical consent, monitors and equipment checked, pre-op evaluation and timeout performed  Central Line Prep  Sterile Tech:gloves, cap, gown, mask and sterile barriers  Prep: chloraprep  Patient monitoring: blood pressure monitoring, continuous pulse oximetry and EKG  Central Line Procedure  Laterality:right  Location:internal jugular  Catheter Type:[place]-Nia  Catheter Size:9 Fr  Guidance:landmark technique and palpation technique  Assessment  Post procedure:biopatch applied, line sutured and occlusive dressing applied  Assessement:blood return through all ports, free fluid flow, chest x-ray ordered and Tee Test  Complications:no  Patient Tolerance:patient tolerated the procedure well with no apparent complications  Additional Notes  Floated 1 attempt no wedge no resistance

## 2024-08-08 NOTE — ANESTHESIA POSTPROCEDURE EVALUATION
Patient: Heladio Clark    Procedure Summary       Date: 08/08/24 Room / Location: [facility] CVOR 01 / [facility] CVOR    Anesthesia Start: 0652 Anesthesia Stop: 1139    Procedure: CORONARY ARTERY BYPASS GRAFTING (Chest) Diagnosis:       Coronary artery disease involving native coronary artery of native heart without angina pectoris      Aneurysm of ascending aorta without rupture      (Coronary artery disease involving native coronary artery of native heart without angina pectoris [I25.10])      (Aneurysm of ascending aorta without rupture [I71.21])    Surgeons: Salvatore Domingo MD Provider: Mitchel Agustin MD    Anesthesia Type: general, Arianne, CVL, PAC ASA Status: 4            Anesthesia Type: general, Makanda, CVL, PAC    Vitals  Vitals Value Taken Time   BP     Temp     Pulse 80 08/08/24 1146   Resp     SpO2 93 % 08/08/24 1146   Vitals shown include unfiled device data.        Post Anesthesia Care and Evaluation    Patient location during evaluation: ICU  Patient participation: complete - patient cannot participate  Level of consciousness: obtunded/minimal responses  Pain scale: See nurse's notes for pain score.  Pain management: adequate    Airway patency: patent  Anesthetic complications: No anesthetic complications  PONV Status: none  Cardiovascular status: acceptable  Respiratory status: acceptable, ventilator and ETT  Hydration status: acceptable    Comments: Patient seen and examined postoperatively; vital signs stable; SpO2 greater than or equal to 90%; cardiopulmonary status stable; nausea/vomiting adequately controlled; pain adequately controlled; no apparent anesthesia complications; patient discharged from anesthesia care when discharge criteria were met

## 2024-08-09 ENCOUNTER — APPOINTMENT (OUTPATIENT)
Dept: GENERAL RADIOLOGY | Facility: HOSPITAL | Age: 54
DRG: 236 | End: 2024-08-09
Payer: COMMERCIAL

## 2024-08-09 LAB
ALBUMIN SERPL-MCNC: 3.7 G/DL (ref 3.5–5.2)
ANION GAP SERPL CALCULATED.3IONS-SCNC: 8.6 MMOL/L (ref 5–15)
BASOPHILS # BLD AUTO: 0.02 10*3/MM3 (ref 0–0.2)
BASOPHILS NFR BLD AUTO: 0.2 % (ref 0–1.5)
BUN SERPL-MCNC: 12 MG/DL (ref 6–20)
BUN/CREAT SERPL: 12.9 (ref 7–25)
CA-I SERPL ISE-MCNC: 1.09 MMOL/L (ref 1.15–1.25)
CALCIUM SPEC-SCNC: 8.2 MG/DL (ref 8.6–10.5)
CHLORIDE SERPL-SCNC: 105 MMOL/L (ref 98–107)
CO2 SERPL-SCNC: 23.4 MMOL/L (ref 22–29)
CREAT SERPL-MCNC: 0.93 MG/DL (ref 0.76–1.27)
DEPRECATED RDW RBC AUTO: 41.9 FL (ref 37–54)
EGFRCR SERPLBLD CKD-EPI 2021: 98.2 ML/MIN/1.73
EOSINOPHIL # BLD AUTO: 0 10*3/MM3 (ref 0–0.4)
EOSINOPHIL NFR BLD AUTO: 0 % (ref 0.3–6.2)
ERYTHROCYTE [DISTWIDTH] IN BLOOD BY AUTOMATED COUNT: 13 % (ref 12.3–15.4)
GLUCOSE BLDC GLUCOMTR-MCNC: 137 MG/DL (ref 70–105)
GLUCOSE BLDC GLUCOMTR-MCNC: 142 MG/DL (ref 70–105)
GLUCOSE BLDC GLUCOMTR-MCNC: 147 MG/DL (ref 70–105)
GLUCOSE BLDC GLUCOMTR-MCNC: 147 MG/DL (ref 70–105)
GLUCOSE BLDC GLUCOMTR-MCNC: 154 MG/DL (ref 70–105)
GLUCOSE BLDC GLUCOMTR-MCNC: 160 MG/DL (ref 70–105)
GLUCOSE BLDC GLUCOMTR-MCNC: 170 MG/DL (ref 70–105)
GLUCOSE BLDC GLUCOMTR-MCNC: 175 MG/DL (ref 70–105)
GLUCOSE SERPL-MCNC: 150 MG/DL (ref 65–99)
HCT VFR BLD AUTO: 37.5 % (ref 37.5–51)
HGB BLD-MCNC: 11.9 G/DL (ref 13–17.7)
IMM GRANULOCYTES # BLD AUTO: 0.06 10*3/MM3 (ref 0–0.05)
IMM GRANULOCYTES NFR BLD AUTO: 0.5 % (ref 0–0.5)
INR PPP: 1.09 (ref 0.93–1.1)
LYMPHOCYTES # BLD AUTO: 0.88 10*3/MM3 (ref 0.7–3.1)
LYMPHOCYTES NFR BLD AUTO: 7.1 % (ref 19.6–45.3)
MAGNESIUM SERPL-MCNC: 1.7 MG/DL (ref 1.6–2.6)
MAGNESIUM SERPL-MCNC: 2.1 MG/DL (ref 1.6–2.6)
MCH RBC QN AUTO: 28.1 PG (ref 26.6–33)
MCHC RBC AUTO-ENTMCNC: 31.7 G/DL (ref 31.5–35.7)
MCV RBC AUTO: 88.4 FL (ref 79–97)
MONOCYTES # BLD AUTO: 0.94 10*3/MM3 (ref 0.1–0.9)
MONOCYTES NFR BLD AUTO: 7.6 % (ref 5–12)
NEUTROPHILS NFR BLD AUTO: 10.49 10*3/MM3 (ref 1.7–7)
NEUTROPHILS NFR BLD AUTO: 84.6 % (ref 42.7–76)
NRBC BLD AUTO-RTO: 0 /100 WBC (ref 0–0.2)
PHOSPHATE SERPL-MCNC: 3.3 MG/DL (ref 2.5–4.5)
PLATELET # BLD AUTO: 236 10*3/MM3 (ref 140–450)
PMV BLD AUTO: 9 FL (ref 6–12)
POTASSIUM SERPL-SCNC: 4.2 MMOL/L (ref 3.5–5.2)
PROTHROMBIN TIME: 11.8 SECONDS (ref 9.6–11.7)
QT INTERVAL: 360 MS
QTC INTERVAL: 413 MS
RBC # BLD AUTO: 4.24 10*6/MM3 (ref 4.14–5.8)
SODIUM SERPL-SCNC: 137 MMOL/L (ref 136–145)
WBC NRBC COR # BLD AUTO: 12.39 10*3/MM3 (ref 3.4–10.8)

## 2024-08-09 PROCEDURE — 71045 X-RAY EXAM CHEST 1 VIEW: CPT

## 2024-08-09 PROCEDURE — 25010000002 CALCIUM GLUCONATE 2-0.675 GM/100ML-% SOLUTION: Performed by: NURSE PRACTITIONER

## 2024-08-09 PROCEDURE — 97166 OT EVAL MOD COMPLEX 45 MIN: CPT

## 2024-08-09 PROCEDURE — 25010000002 ACETAMINOPHEN 10 MG/ML SOLUTION: Performed by: NURSE PRACTITIONER

## 2024-08-09 PROCEDURE — 25010000002 CEFAZOLIN PER 500 MG: Performed by: NURSE PRACTITIONER

## 2024-08-09 PROCEDURE — 83735 ASSAY OF MAGNESIUM: CPT | Performed by: NURSE PRACTITIONER

## 2024-08-09 PROCEDURE — 25010000002 FUROSEMIDE PER 20 MG

## 2024-08-09 PROCEDURE — 94799 UNLISTED PULMONARY SVC/PX: CPT

## 2024-08-09 PROCEDURE — 25010000002 ENOXAPARIN PER 10 MG: Performed by: NURSE PRACTITIONER

## 2024-08-09 PROCEDURE — 25010000002 MAGNESIUM SULFATE 4 GM/100ML SOLUTION

## 2024-08-09 PROCEDURE — 80069 RENAL FUNCTION PANEL: CPT | Performed by: NURSE PRACTITIONER

## 2024-08-09 PROCEDURE — 83735 ASSAY OF MAGNESIUM: CPT

## 2024-08-09 PROCEDURE — 99232 SBSQ HOSP IP/OBS MODERATE 35: CPT | Performed by: INTERNAL MEDICINE

## 2024-08-09 PROCEDURE — 25010000002 MAGNESIUM SULFATE IN D5W 1G/100ML (PREMIX) 1-5 GM/100ML-% SOLUTION: Performed by: NURSE PRACTITIONER

## 2024-08-09 PROCEDURE — 85610 PROTHROMBIN TIME: CPT | Performed by: NURSE PRACTITIONER

## 2024-08-09 PROCEDURE — 25010000002 FUROSEMIDE PER 20 MG: Performed by: THORACIC SURGERY (CARDIOTHORACIC VASCULAR SURGERY)

## 2024-08-09 PROCEDURE — 82330 ASSAY OF CALCIUM: CPT | Performed by: NURSE PRACTITIONER

## 2024-08-09 PROCEDURE — 25010000002 MORPHINE PER 10 MG: Performed by: NURSE PRACTITIONER

## 2024-08-09 PROCEDURE — 93010 ELECTROCARDIOGRAM REPORT: CPT | Performed by: INTERNAL MEDICINE

## 2024-08-09 PROCEDURE — 93005 ELECTROCARDIOGRAM TRACING: CPT | Performed by: NURSE PRACTITIONER

## 2024-08-09 PROCEDURE — 97162 PT EVAL MOD COMPLEX 30 MIN: CPT

## 2024-08-09 PROCEDURE — 82948 REAGENT STRIP/BLOOD GLUCOSE: CPT

## 2024-08-09 PROCEDURE — 85025 COMPLETE CBC W/AUTO DIFF WBC: CPT | Performed by: NURSE PRACTITIONER

## 2024-08-09 RX ORDER — FUROSEMIDE 10 MG/ML
40 INJECTION INTRAMUSCULAR; INTRAVENOUS ONCE
Status: COMPLETED | OUTPATIENT
Start: 2024-08-09 | End: 2024-08-09

## 2024-08-09 RX ORDER — CYCLOBENZAPRINE HCL 10 MG
10 TABLET ORAL 3 TIMES DAILY PRN
Status: DISCONTINUED | OUTPATIENT
Start: 2024-08-09 | End: 2024-08-13 | Stop reason: HOSPADM

## 2024-08-09 RX ORDER — POTASSIUM CHLORIDE 20 MEQ/1
20 TABLET, EXTENDED RELEASE ORAL ONCE
Status: COMPLETED | OUTPATIENT
Start: 2024-08-09 | End: 2024-08-09

## 2024-08-09 RX ORDER — MAGNESIUM SULFATE HEPTAHYDRATE 40 MG/ML
4 INJECTION, SOLUTION INTRAVENOUS ONCE
Status: COMPLETED | OUTPATIENT
Start: 2024-08-09 | End: 2024-08-09

## 2024-08-09 RX ORDER — CALCIUM GLUCONATE 20 MG/ML
2000 INJECTION, SOLUTION INTRAVENOUS ONCE
Status: COMPLETED | OUTPATIENT
Start: 2024-08-09 | End: 2024-08-09

## 2024-08-09 RX ORDER — OXYCODONE HYDROCHLORIDE 5 MG/1
5 TABLET ORAL EVERY 4 HOURS PRN
Status: DISCONTINUED | OUTPATIENT
Start: 2024-08-09 | End: 2024-08-13 | Stop reason: HOSPADM

## 2024-08-09 RX ORDER — OXYCODONE HYDROCHLORIDE 5 MG/1
5 TABLET ORAL ONCE
Status: COMPLETED | OUTPATIENT
Start: 2024-08-09 | End: 2024-08-09

## 2024-08-09 RX ADMIN — SENNOSIDES AND DOCUSATE SODIUM 2 TABLET: 50; 8.6 TABLET ORAL at 08:01

## 2024-08-09 RX ADMIN — MUPIROCIN 1 APPLICATION: 20 OINTMENT TOPICAL at 20:11

## 2024-08-09 RX ADMIN — OXYCODONE 5 MG: 5 TABLET ORAL at 20:12

## 2024-08-09 RX ADMIN — ENOXAPARIN SODIUM 40 MG: 100 INJECTION SUBCUTANEOUS at 15:51

## 2024-08-09 RX ADMIN — SODIUM CHLORIDE 2 G: 900 INJECTION INTRAVENOUS at 18:08

## 2024-08-09 RX ADMIN — SODIUM CHLORIDE 2 G: 900 INJECTION INTRAVENOUS at 02:01

## 2024-08-09 RX ADMIN — MORPHINE SULFATE 2 MG: 2 INJECTION, SOLUTION INTRAMUSCULAR; INTRAVENOUS at 01:03

## 2024-08-09 RX ADMIN — CYCLOBENZAPRINE 10 MG: 10 TABLET, FILM COATED ORAL at 09:26

## 2024-08-09 RX ADMIN — ACETAMINOPHEN 1000 MG: 1000 INJECTION INTRAVENOUS at 02:01

## 2024-08-09 RX ADMIN — MORPHINE SULFATE 2 MG: 2 INJECTION, SOLUTION INTRAMUSCULAR; INTRAVENOUS at 08:00

## 2024-08-09 RX ADMIN — CHLORHEXIDINE GLUCONATE, 0.12% ORAL RINSE 15 ML: 1.2 SOLUTION DENTAL at 08:01

## 2024-08-09 RX ADMIN — SODIUM CHLORIDE 2 G: 900 INJECTION INTRAVENOUS at 10:44

## 2024-08-09 RX ADMIN — CALCIUM GLUCONATE 2000 MG: 20 INJECTION, SOLUTION INTRAVENOUS at 10:15

## 2024-08-09 RX ADMIN — ASPIRIN 81 MG: 81 TABLET, COATED ORAL at 08:01

## 2024-08-09 RX ADMIN — POTASSIUM CHLORIDE 20 MEQ: 1500 TABLET, EXTENDED RELEASE ORAL at 15:53

## 2024-08-09 RX ADMIN — MAGNESIUM SULFATE HEPTAHYDRATE 4 G: 40 INJECTION, SOLUTION INTRAVENOUS at 05:47

## 2024-08-09 RX ADMIN — MORPHINE SULFATE 2 MG: 2 INJECTION, SOLUTION INTRAMUSCULAR; INTRAVENOUS at 10:15

## 2024-08-09 RX ADMIN — MAGNESIUM SULFATE IN DEXTROSE 1 G: 10 INJECTION, SOLUTION INTRAVENOUS at 02:00

## 2024-08-09 RX ADMIN — OXYCODONE 5 MG: 5 TABLET ORAL at 15:54

## 2024-08-09 RX ADMIN — CYCLOBENZAPRINE 10 MG: 10 TABLET, FILM COATED ORAL at 14:17

## 2024-08-09 RX ADMIN — Medication 12.5 MG: at 09:26

## 2024-08-09 RX ADMIN — MUPIROCIN 1 APPLICATION: 20 OINTMENT TOPICAL at 08:00

## 2024-08-09 RX ADMIN — MORPHINE SULFATE 2 MG: 2 INJECTION, SOLUTION INTRAMUSCULAR; INTRAVENOUS at 05:48

## 2024-08-09 RX ADMIN — CHLORHEXIDINE GLUCONATE, 0.12% ORAL RINSE 15 ML: 1.2 SOLUTION DENTAL at 20:11

## 2024-08-09 RX ADMIN — SENNOSIDES AND DOCUSATE SODIUM 2 TABLET: 50; 8.6 TABLET ORAL at 20:11

## 2024-08-09 RX ADMIN — FUROSEMIDE 40 MG: 10 INJECTION, SOLUTION INTRAMUSCULAR; INTRAVENOUS at 15:51

## 2024-08-09 RX ADMIN — Medication 12.5 MG: at 20:11

## 2024-08-09 RX ADMIN — POLYETHYLENE GLYCOL 3350 17 G: 17 POWDER, FOR SOLUTION ORAL at 20:12

## 2024-08-09 RX ADMIN — ATORVASTATIN CALCIUM 40 MG: 40 TABLET, FILM COATED ORAL at 20:11

## 2024-08-09 RX ADMIN — OXYCODONE 5 MG: 5 TABLET ORAL at 11:39

## 2024-08-09 RX ADMIN — PANTOPRAZOLE SODIUM 40 MG: 40 TABLET, DELAYED RELEASE ORAL at 05:47

## 2024-08-09 RX ADMIN — POLYETHYLENE GLYCOL 3350 17 G: 17 POWDER, FOR SOLUTION ORAL at 08:00

## 2024-08-09 RX ADMIN — FUROSEMIDE 40 MG: 10 INJECTION, SOLUTION INTRAMUSCULAR; INTRAVENOUS at 02:34

## 2024-08-09 RX ADMIN — HYDROCODONE BITARTRATE AND ACETAMINOPHEN 1 TABLET: 5; 325 TABLET ORAL at 04:13

## 2024-08-09 NOTE — CASE MANAGEMENT/SOCIAL WORK
Discharge Planning Assessment   Jan     Patient Name: Heladio Clark  MRN: [id number]  Today's Date: 8/9/2024    Admit Date: 8/8/2024    Plan: DC Plan: Pending Clinical Course and PT/OT evaluations. From home with spouse. CABG 8/8/2024   Discharge Needs Assessment       Row Name 08/09/24 1204       Living Environment    People in Home spouse    Name(s) of People in Home Lauren Freddieesther    Current Living Arrangements home    Potentially Unsafe Housing Conditions none    In the past 12 months has the electric, gas, oil, or water company threatened to shut off services in your home? No    Primary Care Provided by self    Provides Primary Care For no one    Family Caregiver if Needed spouse    Family Caregiver Names Lauren Clark    Quality of Family Relationships helpful;involved;supportive    Able to Return to Prior Arrangements yes       Resource/Environmental Concerns    Resource/Environmental Concerns none    Transportation Concerns none       Transportation Needs    In the past 12 months, has lack of transportation kept you from medical appointments or from getting medications? no    In the past 12 months, has lack of transportation kept you from meetings, work, or from getting things needed for daily living? No       Food Insecurity    Within the past 12 months, you worried that your food would run out before you got the money to buy more. Never true    Within the past 12 months, the food you bought just didn't last and you didn't have money to get more. Never true       Transition Planning    Patient/Family Anticipates Transition to home with family    Patient/Family Anticipated Services at Transition none    Transportation Anticipated car, drives self;family or friend will provide       Discharge Needs Assessment    Readmission Within the Last 30 Days no previous admission in last 30 days    Equipment Currently Used at Home none    Concerns to be Addressed discharge planning    Anticipated Changes Related to  Illness none    Equipment Needed After Discharge other (see comments)  CM will conitnue to monitor for needs    Provided Post Acute Provider List? N/A    Provided Post Acute Provider Quality & Resource List? N/A                   Discharge Plan       Row Name 08/09/24 1203       Plan    Plan DC Plan: Pending Clinical Course and PT/OT evaluations. From home with spouse. CABG 8/8/2024    Patient/Family in Agreement with Plan yes    Provided Post Acute Provider List? N/A    Provided Post Acute Provider Quality & Resource List? N/A    Plan Comments CM spoke with patient at bedside to discuss admission assessment and discharge planning. Patient confirms PCP and pharmacy. Patient confirms he is agreeable to enrolling in meds to bed program. CM enrolled patient and updated pharmacy in [facility].Patient denies any difficulty affording medications or food at this time. Patient denies any additional needs for services or DME at this time. Patient reports IADL's and drives. Patient spouse will provide transportation when ready for DC.CM spoke with patient’s nurse and [facility] NP Dante Schrader to obtain clinical updates. Awaiting PT/OT recommendations at this time for DC planning. CM will continue to follow for any further needs and adjust discharge plan accordingly. DC Barriers: POD 1 OHS, cardiac monitoring, O2@4L nc, Central Line, Arterial Line, Pacer Wires, Chest Tubes x3, IV abx/Electrolytes, and monitor labs.               Expected Discharge Date and Time       Expected Discharge Date Expected Discharge Time    Aug 13, 2024            Demographic Summary       Row Name 08/09/24 1205       General Information    Admission Type inpatient    Arrived From home;operating room    Referral Source admission list    Reason for Consult discharge planning    Preferred Language English       Contact Information    Permission Granted to Share Info With                    Functional Status       Row Name 08/09/24 1204       Functional  Status    Usual Activity Tolerance excellent    Current Activity Tolerance moderate       Physical Activity    On average, how many days per week do you engage in moderate to strenuous exercise (like a brisk walk)? 0 days    On average, how many minutes do you engage in exercise at this level? 0 min    Number of minutes of exercise per week 0       Functional Status, IADL    Medications independent    Meal Preparation independent    Housekeeping independent    Laundry independent    Shopping independent       Mental Status    General Appearance WDL WDL       Mental Status Summary    Recent Changes in Mental Status/Cognitive Functioning no changes       Employment/    Employment Status employed full-time    Current or Previous Occupation education    Employment/ Comments [employer]    Current or Previous  Service none                 Met with patient in room   Maintain distance greater than six feet and spent less than fifteen minutes in the room.      Cristiane Hilario RN     Office Phone: [phone]  Office Cell:     [phone]

## 2024-08-09 NOTE — THERAPY EVALUATION
Patient Name: Heladio Clark  : 1970    MRN: [id number]                              Today's Date: 2024       Admit Date: 2024    Visit Dx:     ICD-10-CM ICD-9-CM   1. Coronary artery disease involving native coronary artery of native heart without angina pectoris  I25.10 414.01   2. Aneurysm of ascending aorta without rupture  I71.21 441.2     Patient Active Problem List   Diagnosis    CAD (coronary artery disease)    HLD (hyperlipidemia)    Ascending aortic aneurysm    Heart murmur    Abnormal nuclear stress test    Coronary heart disease     Past Medical History:   Diagnosis Date    Hyperlipidemia      Past Surgical History:   Procedure Laterality Date    CARDIAC CATHETERIZATION N/A 2024    Procedure: Left Heart Cath possible PCI;  Surgeon: Wilmer John MD;  Location: [facility] CATH INVASIVE LOCATION;  Service: Cardiovascular;  Laterality: N/A;    TONSILLECTOMY        General Information       Row Name 24 1623          OT Time and Intention    Document Type evaluation  -SR     Mode of Treatment occupational therapy  -SR       Row Name 24 1623          General Information    Existing Precautions/Restrictions sternal  -SR       Row Name 24 1623          Occupational Profile    Reason for Services/Referral (Occupational Profile) 54-year-old male who was evaluated in our office by OP Cardiology; he was also noted to have a 4.4 cm mid ascending thoracic aneurysm.  Exercise myocardial perfusion study was performed with hypertensive response and nondiagnostic EKG changes with upsloping ST segment depression with exercise.   Heart catheterization was subsequently performed 24: with severe multivessel CAD; he was determined to be a candidate for CABG with was completed 24.  In his normal state he resides in a [facility] with [address] with his spouse.  Both he and his spouse work full time.  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  -SR        Row Name 08/09/24 1623          Living Environment    People in Home spouse  -SR       Row Name 08/09/24 1623          Cognition    Orientation Status (Cognition) oriented x 4  -SR       Row Name 08/09/24 1623          Safety Issues, Functional Mobility    Impairments Affecting Function (Mobility) balance;endurance/activity tolerance;strength;shortness of breath  -SR               User Key  (r) = Recorded By, (t) = Taken By, (c) = Cosigned By      Initials Name Provider Type    SR Kailey Goodman OT Occupational Therapist                     Mobility/ADL's       Row Name 08/09/24 1623          Sit-Stand Transfer    Sit-Stand Jefferson (Transfers) moderate assist (50% patient effort)  -SR     Assistive Device (Sit-Stand Transfers) walker, front-wheeled  -SR               User Key  (r) = Recorded By, (t) = Taken By, (c) = Cosigned By      Initials Name Provider Type    SR Kailey Goodman OT Occupational Therapist                   Obj/Interventions       Row Name 08/09/24 1624          Range of Motion Comprehensive    Comment, General Range of Motion Within restricted limits  -SR       [facility] Name 08/09/24 1624          Strength Comprehensive (MMT)    General Manual Muscle Testing (MMT) Assessment no strength deficits identified  -SR       [facility] Name 08/09/24 1624          Balance    Balance Interventions sitting;standing;sit to stand;supported;static;dynamic;minimal challenge  -SR               User Key  (r) = Recorded By, (t) = Taken By, (c) = Cosigned By      Initials Name Provider Type    SR Kailey Goodman OT Occupational Therapist                   Goals/Plan       [facility] Name 08/09/24 1626          Bathing Goal 1 (OT)    Activity/Device (Bathing Goal 1, OT) bathing skills, all  -SR     Jefferson Level/Cues Needed (Bathing Goal 1, OT) modified independence  -SR     Time Frame (Bathing Goal 1, OT) 2 weeks  -SR       [facility] Name 08/09/24 1626          Toileting Goal 1 (OT)    Activity/Device  (Toileting Goal 1, OT) toileting skills, all  -SR     Josephine Level/Cues Needed (Toileting Goal 1, OT) modified independence  -SR     Time Frame (Toileting Goal 1, OT) 2 weeks  -SR       Row Name 08/09/24 1626          Therapy Assessment/Plan (OT)    Planned Therapy Interventions (OT) activity tolerance training;BADL retraining;IADL retraining;functional balance retraining;neuromuscular control/coordination retraining;ROM/therapeutic exercise;transfer/mobility retraining;strengthening exercise;occupation/activity based interventions  -SR               User Key  (r) = Recorded By, (t) = Taken By, (c) = Cosigned By      Initials Name Provider Type    SR Kailey Goodman, OT Occupational Therapist                   Clinical Impression       Row Name 08/09/24 1624          Pain Assessment    Pretreatment Pain Rating 8/10  -SR     Posttreatment Pain Rating 8/10  -SR       Row Name 08/09/24 1624          Plan of Care Review    Outcome Evaluation 54-year-old male who was evaluated in our office by OP Cardiology; he was also noted to have a 4.4 cm mid ascending thoracic aneurysm.  Exercise myocardial perfusion study was performed with hypertensive response and nondiagnostic EKG changes with upsloping ST segment depression with exercise.   Heart catheterization was subsequently performed 8-2-24: with severe multivessel CAD; he was determined to be a candidate for CABG with was completed 8/8/24.  In his normal state he resides in a [facility] with [address] with his spouse.  Both he and his spouse work full time.  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  This date pt c/o significant pain from chest tubes.  He tolerates upright activity well with vitals WFL.  Anticipate he will progress well with ADLs during hospitalization and return home with assist.  -SR       Row Name 08/09/24 1621          Therapy Assessment/Plan (OT)    Rehab Potential (OT) good, to achieve stated therapy goals   -SR     Criteria for Skilled Therapeutic Interventions Met (OT) yes  -SR     Therapy Frequency (OT) 5 times/wk  -SR     Predicted Duration of Therapy Intervention (OT) Until discharge  -SR       Row Name 08/09/24 1624          Therapy Plan Review/Discharge Plan (OT)    Anticipated Discharge Disposition (OT) home with assist  -SR       Row Name 08/09/24 1624          Positioning and Restraints    Pre-Treatment Position sitting in chair/recliner  -SR     Post Treatment Position chair  -SR     In Chair call light within reach;encouraged to call for assist;exit alarm on  -SR               User Key  (r) = Recorded By, (t) = Taken By, (c) = Cosigned By      Initials Name Provider Type    SR Kailey Goodman, OT Occupational Therapist                   Outcome Measures       Row Name 08/09/24 1308          How much help from another person do you currently need...    Turning from your back to your side while in flat bed without using bedrails? 2  -RR     Moving from lying on back to sitting on the side of a flat bed without bedrails? 2  -RR     Moving to and from a bed to a chair (including a wheelchair)? 2  -RR     Standing up from a chair using your arms (e.g., wheelchair, bedside chair)? 2  -RR     Climbing 3-5 steps with a railing? 3  -RR     To walk in hospital room? 3  -RR     AM-PAC 6 Clicks Score (PT) 14  -RR     Highest Level of Mobility Goal 4 --> Transfer to chair/commode  -RR               User Key  (r) = Recorded By, (t) = Taken By, (c) = Cosigned By      Initials Name Provider Type    RR Valeri Pyle, PT Physical Therapist                    Occupational Therapy Education       Title: PT OT SLP Therapies (In Progress)       Topic: Occupational Therapy (In Progress)       Point: Precautions (In Progress)       Description:   Instruct learner(s) on prescribed precautions during self-care and functional transfers.                  Learning Progress Summary             Patient Acceptance, E,TB, NR by  SR at 8/9/2024 1626                                         User Key       Initials Effective Dates Name Provider Type Discipline    SR 06/16/21 -  Kailey Goodman, OT Occupational Therapist OT                  OT Recommendation and Plan  Planned Therapy Interventions (OT): activity tolerance training, BADL retraining, IADL retraining, functional balance retraining, neuromuscular control/coordination retraining, ROM/therapeutic exercise, transfer/mobility retraining, strengthening exercise, occupation/activity based interventions  Therapy Frequency (OT): 5 times/wk  Plan of Care Review  Outcome Evaluation: 54-year-old male who was evaluated in our office by OP Cardiology; he was also noted to have a 4.4 cm mid ascending thoracic aneurysm.  Exercise myocardial perfusion study was performed with hypertensive response and nondiagnostic EKG changes with upsloping ST segment depression with exercise.   Heart catheterization was subsequently performed 8-2-24: with severe multivessel CAD; he was determined to be a candidate for CABG with was completed 8/8/24.  In his normal state he resides in a [facility] with [address] with his spouse.  Both he and his spouse work full time.  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  This date pt c/o significant pain from chest tubes.  He tolerates upright activity well with vitals WFL.  Anticipate he will progress well with ADLs during hospitalization and return home with assist.     Time Calculation:         Time Calculation- OT       Row Name 08/09/24 1626             Time Calculation- OT    OT Start Time 1007  -SR      OT Stop Time 1030  -SR      OT Time Calculation (min) 23 min  -SR      Total Timed Code Minutes- OT 0 minute(s)  -SR      OT Received On 08/09/24  -SR      OT - Next Appointment 08/12/24  -SR      OT Goal Re-Cert Due Date 08/23/24  -SR                User Key  (r) = Recorded By, (t) = Taken By, (c) = Cosigned By      Initials Name  Provider Type    SR Kailey Goodman, OT Occupational Therapist                  Therapy Charges for Today       Code Description Service Date Service Provider Modifiers Qty    [id number] HC OT EVAL MOD COMPLEXITY 4 8/9/2024 Kailey Goodman, OT GO 1                 Kailey Goodman, MIKA  8/9/2024

## 2024-08-09 NOTE — PROGRESS NOTES
S/P POD# 1 off pump CABG x3 with bilateral IMAs--Camporrotondo  EF 60% (cath)    Subjective:  would like chest tubes out    Extubated ~ 1830  Drips:  insulin  CI 3, CVP 18,   Wt is up 1 kg from preop  CXR:  atel      Intake/Output Summary (Last 24 hours) at 8/9/2024 0838  Last data filed at 8/9/2024 0630  Gross per 24 hour   Intake 3378 ml   Output 3580 ml   Net -202 ml     Temp:  [93.6 °F (34.2 °C)-100.8 °F (38.2 °C)] 98.5 °F (36.9 °C)  Heart Rate:  [70-97] 76  Resp:  [12-24] 18  BP: ()/(58-97) 99/77  FiO2 (%):  [40 %-70 %] 40 %  Bilat /8  /8    Results from last 7 days   Lab Units 08/09/24  0253 08/08/24  1937 08/08/24  1219 08/08/24  1145 08/08/24  0749 08/05/24  0822   WBC 10*3/mm3 12.39*  --   --  14.12*  --  10.11   HEMOGLOBIN g/dL 11.9*  --   --  12.6*  --  13.4   HEMOGLOBIN, POC g/dL  --  14.4   < >  --    < >  --    HEMATOCRIT % 37.5  --   --  39.3  --  41.6   HEMATOCRIT POC %  --  42   < >  --    < >  --    PLATELETS 10*3/mm3 236  --   --  214  --  268   INR  1.09  --   --  1.10  --  0.98    < > = values in this interval not displayed.     Results from last 7 days   Lab Units 08/09/24  0253   CREATININE mg/dL 0.93   POTASSIUM mmol/L 4.2   SODIUM mmol/L 137   MAGNESIUM mg/dL 1.7   PHOSPHORUS mg/dL 3.3     ica 1.09    Physical Exam:  Neuro intact, nad, up in recliner  Tele:  SR 70s  Diminished bases, 96% 4L  dsg c/d/i, no drainage  Benign abd, no BM  No edema    Assessment/Plan:  Principal Problem:    Coronary heart disease  Active Problems:    CAD (coronary artery disease)    Ascending aortic aneurysm      POD# 1.  Doing well.  DC gregor/ simone.  On asa/statin/ add low dose bb.  Replete e-.  Received diuretics early this morning, diuresed well.  Add oxycodone, pt max-ed on Tylenol and Flexeril.  Re-eval chest tubes after pt has ambulated.    Addendum:  DC chest tubes.  Extra diuretics this afternoon ordered.  IV Lasix ordered for tomorrow per Dr. Domingo.    Routine care--as  above  De-escal  D/w pt/nsg, Dr. Domingo   Anticipate home at discharge    Fadia Lance, [place]  8/9/2024  08:38 EDT

## 2024-08-09 NOTE — CONSULTS
Pt and family educated on cardiac rehab program. Cardiac rehab brochure, Diet packet, OHS exercise guide, activity restrictions, and antiplatelet sheet left with patient. Telephone number verified. Will follow up with patient.

## 2024-08-09 NOTE — PLAN OF CARE
Goal Outcome Evaluation:              Outcome Evaluation: 54-year-old male who was evaluated in our office by OP Cardiology; he was also noted to have a 4.4 cm mid ascending thoracic aneurysm.  Exercise myocardial perfusion study was performed with hypertensive response and nondiagnostic EKG changes with upsloping ST segment depression with exercise.   Heart catheterization was subsequently performed 8-2-24: with severe multivessel CAD; he was determined to be a candidate for CABG with was completed 8/8/24.  In his normal state he resides in a [facility] with [address] with his spouse.  Both he and his spouse work full time.  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  This date pt c/o significant pain from chest tubes.  He tolerates upright activity well with vitals WFL.  Anticipate he will progress well with ADLs during hospitalization and return home with assist.      Anticipated Discharge Disposition (OT): home with assist

## 2024-08-09 NOTE — PROGRESS NOTES
"  Cardiology Progress  Note      Patient Care Team:  Dilcia Spaulding APRN as PCP - General (Family Medicine)    PATIENT IDENTIFICATION  Name: Heladio Clark  Age: 54 y.o.  Sex: male  :  1970  MRN: [id number]      Length of stay:    LOS: 1 day           REASON FOR FOLLOW-UP:  Multivessel coronary artery disease  Status post three-vessel CABG      INTERVAL HISTORY  Patient seen and examined, chart and labs reviewed.  Patient was extubated 1830 yesterday, sitting in bedside chair.  He is alert and oriented x 3.  He does report significant sternal wound discomfort.  Rhythm sinus, /80.  Drips: IV magnesium replacement.  Plans to DC [place]/A-line today, add BB.      SUBJECTIVE    Sternal wound discomfort  Shortness of breath secondary to pain  No GI complaints  No dizziness      REVIEW OF SYSTEMS:  Pertinent items are noted in HPI, all other systems reviewed and negative    OBJECTIVE   Hemoglobin 11.9    ASSESSMENT  Severe multivessel coronary artery disease status post CABG  Ascending aortic aneurysm-4.2 cm per CTA 2024  Mixed hyperlipidemia  Obstructive sleep apnea  Significant paternal family history of premature coronary disease  Hyperglycemia      RECOMMENDATIONS  Continue current CTS postop protocol  Continue CV supportive care  Monitor rhythm and hemodynamics closely  Monitor and replete electrolytes as needed  Activity as tolerated          CHF Guideline Directed Medical Therapy  Beta Blocker:   ARNI/ACE/ARB:   SGLT 2 inhibitors:   Diuretics:   Aldosterone Antagonist:   Vasodilators & Nitrates:       Vital Signs  Visit Vitals  /80   Pulse 79   Temp 98.5 °F (36.9 °C) (Oral)   Resp 18   Ht 175.3 cm (69.02\")   Wt 106 kg (232 lb 11.2 oz)   SpO2 96%   BMI 34.35 kg/m²     Oxygen Therapy  SpO2: 96 %  Pulse Oximetry Type: Continuous  Device (Oxygen Therapy): nasal cannula  Flow (L/min): 4  Oxygen Concentration (%): 4  Flowsheet Rows      Flowsheet Row First Filed Value   Admission Height 175.3 cm " "(69.02\") Documented at 08/08/2024 1900   Admission Weight 105 kg (231 lb 6.4 oz) Documented at 08/08/2024 0500          Intake & Output (last 3 days)         08/06 0701  08/07 0700 08/07 0701 08/08 0700 08/08 0701 08/09 0700 08/09 0701  08/10 0700    P.O.   150     I.V. (mL/kg)   2843 (26.8)     Blood   135     IV Piggyback   250     Total Intake(mL/kg)   3378 (31.9)     Urine (mL/kg/hr)   2690 (1.1)     Emesis/NG output   0     Stool   0     Blood   150     Chest Tube   740 70    Wound   0     Total Output   3580 70    Net   -202 -70            Stool Unmeasured Occurrence   0 x     Emesis Unmeasured Occurrence   0 x           Lines, Drains & Airways       Active LDAs       Name Placement date Placement time Site Days    CVC Double Lumen 08/08/24 Right Internal jugular 08/08/24  0705  created via procedure documentation  Internal jugular  1    Peripheral IV 08/08/24 Distal;Posterior;Right Forearm 08/08/24  --  Forearm  1    Chest Tube Mediastinal 08/08/24  --  Mediastinal  1    Urethral Catheter Temperature probe 16 Fr. 08/08/24  --  -- 1    Y Chest Tube 1 and 2 Right Pleural 28 Fr. Left Pleural 28 Fr. 08/08/24  --  -- 1    Pacer Wires --  --  Atrial and Ventricular  --                           /80   Pulse 79   Temp 98.5 °F (36.9 °C) (Oral)   Resp 18   Ht 175.3 cm (69.02\")   Wt 106 kg (232 lb 11.2 oz)   SpO2 96%   BMI 34.35 kg/m²   Intake/Output last 3 shifts:  I/O last 3 completed shifts:  In: 3378 [P.O.:150; I.V.:2843; Blood:135; IV Piggyback:250]  Out: 3580 [Urine:2690; Blood:150; Chest Tube:740]  Intake/Output this shift:  I/O this shift:  In: -   Out: 70 [Chest Tube:70]    PHYSICAL EXAM:    General: Alert, cooperative, no distress, appears stated age  Head:  Normocephalic, atraumatic, mucous membranes moist  Eyes:  Conjunctivae/corneas clear, EOM's intact     Neck:  IJ right  Lungs: Diminished but clear to auscultation bilaterally, no wheezes, rhonchi or rales are noted  Chest wall: Sternal " wound dry and intact  Heart::  Regular rate and rhythm, S1 and S2 normal, no murmur, rub or gallop  Abdomen: Soft, nontender, nondistended, bowel sounds active  Extremities: No cyanosis, clubbing, or edema   Pulses: 2+ and symmetric all extremities  Skin:  No rashes or lesions  Neuro/psych: A&O x3. CN II through XII are grossly intact with appropriate affect        Scheduled Meds:      aspirin, 81 mg, Oral, Daily  atorvastatin, 40 mg, Oral, Nightly  ceFAZolin, 2 g, Intravenous, Q8H  chlorhexidine, 15 mL, Mouth/Throat, Q12H  enoxaparin, 40 mg, Subcutaneous, Daily  metoprolol tartrate, 12.5 mg, Oral, Q12H  mupirocin, , Each Nare, BID  pantoprazole, 40 mg, Oral, Q AM  polyethylene glycol, 17 g, Oral, BID  senna-docusate sodium, 2 tablet, Oral, BID        Continuous Infusions:    insulin, 0-100 Units/hr, Last Rate: Stopped (08/08/24 1145)  sodium chloride, 30 mL/hr, Last Rate: 30 mL/hr (08/08/24 1508)        PRN Meds:      acetaminophen **OR** acetaminophen **OR** acetaminophen    Calcium Replacement - Follow Nurse / BPA Driven Protocol    cyclobenzaprine    dextrose    dextrose    glucagon (human recombinant)    hydrALAZINE    HYDROcodone-acetaminophen    Magnesium Cardiology Dose Replacement - Follow Nurse / BPA Driven Protocol    meperidine    Morphine **AND** naloxone    nitroglycerin    ondansetron    oxyCODONE    Phosphorus Replacement - Follow Nurse / BPA Driven Protocol    Potassium Replacement - Follow Nurse / BPA Driven Protocol        Results Review:     I reviewed the patient's new clinical results.    CBC    Results from last 7 days   Lab Units 08/09/24  0253 08/08/24  1937 08/08/24  1816 08/08/24  1646 08/08/24  1540 08/08/24  1459 08/08/24  1335 08/08/24  1219 08/08/24  1145 08/08/24  0749 08/05/24  0822   WBC 10*3/mm3 12.39*  --   --   --   --   --   --   --  14.12*  --  10.11   HEMOGLOBIN g/dL 11.9*  --   --   --   --   --   --   --  12.6*  --  13.4   HEMOGLOBIN, POC g/dL  --  14.4 14.1 13.7 14.1 13.9  13.9   < >  --    < >  --    PLATELETS 10*3/mm3 236  --   --   --   --   --   --   --  214  --  268    < > = values in this interval not displayed.     Cr Clearance Estimated Creatinine Clearance: 110.2 mL/min (by C-G formula based on SCr of 0.93 mg/dL).  Coag   Results from last 7 days   Lab Units 08/09/24  0253 08/08/24  1145 08/05/24  0822   INR  1.09 1.10 0.98   APTT seconds  --  31.9* 32.4*     HbA1C   Lab Results   Component Value Date    HGBA1C 6.23 (H) 08/05/2024     Blood Glucose   Glucose   Date/Time Value Ref Range Status   08/09/2024 0834 137 (H) 70 - 105 mg/dL Final     Comment:     Serial Number: [id number]:  [id number]   08/09/2024 0600 147 (H) 70 - 105 mg/dL Final     Comment:     Serial Number: [id number]:  [id number]   08/09/2024 0431 170 (H) 70 - 105 mg/dL Final     Comment:     Serial Number: [id number]:  [id number]   08/09/2024 0252 175 (H) 70 - 105 mg/dL Final     Comment:     Serial Number: [id number]:  [id number]   08/09/2024 0012 142 (H) 70 - 105 mg/dL Final     Comment:     Serial Number: [id number]:  [id number]   08/08/2024 2247 164 (H) 70 - 105 mg/dL Final     Comment:     Serial Number: [id number]:  [id number]   08/08/2024 2034 135 (H) 70 - 105 mg/dL Final     Comment:     Serial Number: [id number]:  [id number]   08/08/2024 1937 128 (H) 74 - 100 mg/dL Final   08/08/2024 1937 128 (H) 74 - 100 mg/dL Final     Comment:     Serial Number: [id number]:  [id number]     Infection     CMP   Results from last 7 days   Lab Units 08/09/24  0253 08/08/24  1335 08/08/24  1219 08/08/24  1145 08/05/24  0902 08/05/24  0822   SODIUM mmol/L 137  --   --  138  --  140   POTASSIUM mmol/L 4.2  --   --  4.1  --  3.7   CHLORIDE mmol/L 105  --   --  108*  --  106   CO2 mmol/L 23.4  --   --  24.2  --  24.0   BUN mg/dL 12  --   --  9  --  10   CREATININE mg/dL 0.93 0.73 0.77 0.79 1.00 0.90   GLUCOSE mg/dL 150*  --   --  129*  --  124*   ALBUMIN g/dL 3.7  --   --  3.4*  --   "3.9   BILIRUBIN mg/dL  --   --   --   --   --  0.4   ALK PHOS U/L  --   --   --   --   --  92   AST (SGOT) U/L  --   --   --   --   --  16   ALT (SGPT) U/L  --   --   --   --   --  22     ABG    Results from last 7 days   Lab Units 08/08/24 1937 08/08/24 1816 08/08/24 1646 08/08/24  1540 08/08/24  1459 08/08/24  1335 08/08/24  1219   PH, ARTERIAL pH units 7.340* 7.352 7.356 7.282* 7.345* 7.363 7.326*   PCO2, ARTERIAL mm Hg 44.3 43.6 42.4 52.8* 44.1 37.8 43.2   PO2 ART mm Hg 110.7* 129.1* 107.5 104.4 94.5 94.1 86.9   O2 SATURATION ART % 98.0 98.8* 97.9 97.1 96.9 97.1 95.8   BASE EXCESS ART mmol/L -2.0* -1.6* -1.9* -2.5* -1.8* -3.5* -3.4*     UA    Results from last 7 days   Lab Units 08/05/24  0822   NITRITE UA  Negative   WBC UA /HPF 3-5*   BACTERIA UA /HPF None Seen   SQUAM EPITHEL UA /HPF 0-2     VERN  No results found for: \"POCMETH\", \"POCAMPHET\", \"POCBARBITUR\", \"POCBENZO\", \"POCCOCAINE\", \"POCOPIATES\", \"POCOXYCODO\", \"POCPHENCYC\", \"POCPROPOXY\", \"POCTHC\", \"POCTRICYC\"  Lysis Labs   Results from last 7 days   Lab Units 08/09/24  0253 08/08/24 1937 08/08/24 1816 08/08/24 1646 08/08/24  1540 08/08/24  1459 08/08/24  1335 08/08/24  1219 08/08/24  1145 08/08/24  0749 08/05/24  0902 08/05/24  0822   INR  1.09  --   --   --   --   --   --   --  1.10  --   --  0.98   APTT seconds  --   --   --   --   --   --   --   --  31.9*  --   --  32.4*   HEMOGLOBIN g/dL 11.9*  --   --   --   --   --   --   --  12.6*  --   --  13.4   HEMOGLOBIN, POC g/dL  --  14.4 14.1 13.7 14.1 13.9 13.9 12.6  --    < >  --   --    PLATELETS 10*3/mm3 236  --   --   --   --   --   --   --  214  --   --  268   CREATININE mg/dL 0.93  --   --   --   --   --  0.73 0.77 0.79  --  1.00 0.90    < > = values in this interval not displayed.     Radiology(recent) XR Chest 1 View    Result Date: 8/9/2024  Impression: 1. Interval extubation and removal of esophagogastric tube. 2. Stable right IJ [place]-Nia catheter. 3. No pneumothorax. 4. Mild right perihilar and " linear bibasilar atelectasis unchanged. Electronically Signed: Albert Martin MD  8/9/2024 7:07 AM EDT  Workstation ID: [id number]    XR Abdomen KUB    Result Date: 8/8/2024  Impression: Orogastric tube tip in the distal stomach. Electronically Signed: Suyapa Fitzgerald MD  8/8/2024 12:20 PM EDT  Workstation ID: [id number]    XR Chest 1 View    Result Date: 8/8/2024  Impression: 1. Support line and tube placements as described. No visible pneumothorax. 2. Bibasilar linear subsegmental atelectasis. Electronically Signed: Roxanne Schwarz MD  8/8/2024 12:20 PM EDT  Workstation ID: [id number]             X-rays, labs reviewed personally by physician.    ECG/EMG Results (most recent)       Procedure Component Value Units Date/Time    Telemetry Scan [[id number]] Resulted: 08/08/24     Updated: 08/08/24 1702    Telemetry Scan [[id number]] Resulted: 08/08/24     Updated: 08/08/24 2110    Telemetry Scan [[id number]] Resulted: 08/08/24     Updated: 08/08/24 2110    Telemetry Scan [[id number]] Resulted: 08/08/24     Updated: 08/08/24 2137    Telemetry Scan [[id number]] Resulted: 08/08/24     Updated: 08/09/24 0246    ECG 12 Lead Other; s/p CABG [[id number]] Collected: 08/09/24 0510     Updated: 08/09/24 0512     QT Interval 360 ms      QTC Interval 413 ms     Narrative:      HEART RATE=79  bpm  RR Etwrkoax=376  ms  KS Cryezmpj=326  ms  P Horizontal Axis=-25  deg  P Front Axis=58  deg  QRSD Interval=90  ms  QT Xfwbrvng=946  ms  WLoL=323  ms  QRS Axis=36  deg  T Wave Axis=18  deg  - ABNORMAL ECG -  Sinus rhythm  Probable left atrial enlargement  Lateral infarct, acute (LAD)  Borderline ST elevation, anterior leads  Date and Time of Study:2024-08-09 05:10:47    Telemetry Scan [[id number]] Resulted: 08/08/24     Updated: 08/09/24 0556    Telemetry Scan [[id number]] Resulted: 08/08/24     Updated: 08/09/24 0824              Medication Review:   I have reviewed the patient's current medication list  Scheduled Meds:aspirin, 81 mg, Oral,  Daily  atorvastatin, 40 mg, Oral, Nightly  ceFAZolin, 2 g, Intravenous, Q8H  chlorhexidine, 15 mL, Mouth/Throat, Q12H  enoxaparin, 40 mg, Subcutaneous, Daily  metoprolol tartrate, 12.5 mg, Oral, Q12H  mupirocin, , Each Nare, BID  pantoprazole, 40 mg, Oral, Q AM  polyethylene glycol, 17 g, Oral, BID  senna-docusate sodium, 2 tablet, Oral, BID      Continuous Infusions:insulin, 0-100 Units/hr, Last Rate: Stopped (08/08/24 1145)  sodium chloride, 30 mL/hr, Last Rate: 30 mL/hr (08/08/24 1508)      PRN Meds:.  acetaminophen **OR** acetaminophen **OR** acetaminophen    Calcium Replacement - Follow Nurse / BPA Driven Protocol    cyclobenzaprine    dextrose    dextrose    glucagon (human recombinant)    hydrALAZINE    HYDROcodone-acetaminophen    Magnesium Cardiology Dose Replacement - Follow Nurse / BPA Driven Protocol    meperidine    Morphine **AND** naloxone    nitroglycerin    ondansetron    oxyCODONE    Phosphorus Replacement - Follow Nurse / BPA Driven Protocol    Potassium Replacement - Follow Nurse / BPA Driven Protocol    Imaging:  Imaging Results (Last 72 Hours)       Procedure Component Value Units Date/Time    XR Chest 1 View [[id number]] Collected: 08/09/24 0706     Updated: 08/09/24 0709    Narrative:      XR CHEST 1 VW    Date of Exam: 8/9/2024 6:11 AM EDT    Indication: Post-Op Heart Surgery    Comparison: 8/8/2024    Findings:  Status post sternotomy. The patient has been extubated. The esophagogastric tube is been removed. There is a stable right IJ [place]-Nia catheter with tip overlying the main pulmonary arterial outflow. Mild right perihilar and linear bibasilar atelectasis   unchanged. Negative for pneumothorax. No significant effusion.      Impression:      Impression:  1. Interval extubation and removal of esophagogastric tube.  2. Stable right IJ [place]-Nia catheter.  3. No pneumothorax.  4. Mild right perihilar and linear bibasilar atelectasis unchanged.        Electronically Signed: Albert Martin MD  "   8/9/2024 7:07 AM EDT    Workstation ID: [id number]    XR Abdomen KUB [[id number]] Collected: 08/08/24 1219     Updated: 08/08/24 1222    Narrative:      XR ABDOMEN KUB    Date of Exam: 8/8/2024 12:00 PM EDT    Indication: OG placement verification    Comparison: None available.    Findings:  Single supine image. There is considerable artifact over the abdomen. There is an orogastric tube with its tip in the distal stomach. Visualized bowel is nondilated.      Impression:      Impression:  Orogastric tube tip in the distal stomach.      Electronically Signed: Suyapa Fitzgerald MD    8/8/2024 12:20 PM EDT    Workstation ID: [id number]    XR Chest 1 View [[id number]] Collected: 08/08/24 1219     Updated: 08/08/24 1222    Narrative:      XR CHEST 1 VW    Date of Exam: 8/8/2024 12:00 PM EDT    Indication: Post-Op Check Line & Tube Placement    Comparison: CT chest 8/5/2024, PA and lateral chest 8/5/2024    Findings:  Median sternotomy changes are present. Heart size is normal. Linear subsegmental atelectasis is seen in a bibasilar distribution. No pleural effusion or pneumothorax is identified. Right presumed bibasilar chest tubes projecting over the diaphragmatic   margins.. NG tube extends below the diaphragm with the tip not included in the field-of-view. Right IJ approach [place]-Nia catheter tip extends to the main pulmonary artery level. ET tube tip projects approximately 3.8 cm above the level of the estelita.      Impression:      Impression:    1. Support line and tube placements as described. No visible pneumothorax.  2. Bibasilar linear subsegmental atelectasis.      Electronically Signed: Roxanne Schwarz MD    8/8/2024 12:20 PM EDT    Workstation ID: [id number]              ALEXANDRIA Mcwilliams  08/09/24  12:01 EDT       EMR Dragon/Transcription:   \"Dictated utilizing Dragon dictation\".       Electronically signed by ALEXANDRIA Mcwilliams, 08/09/24, 12:01 PM EDT.  Copied text in this note has been reviewed by me and " is accurate as of 08/09/24.    Cardiology attending:  Seen and examined.  Chart and labs reviewed. Independent interpretations of cardiac testing was performed. History and exam findings are verified with above changes noted.  Assessment and plan notated by APC after being formulated by attending consultant.  Note that greater than 50% of the time spent in care of the patient was provided by attending consultant.    Patient is doing great.  Chest tubes are out.  Lines are out with the exception of pacer wires.  He is ambulated to the door today.  No drips.  He is reporting a significant mount of sternal discomfort.  Rhythm stable.  Blood pressure stable.  Continue to increase activity as tolerated.  Monitor rate and rhythm.    Physical Exam:    General: Alert, cooperative, mild to moderate discomfort.  Appears stated age  Head:  Normocephalic, atraumatic, mucous membranes moist  Eyes:  Conjunctivae/corneas clear, EOM's intact     Neck:  Supple,  no bruit  Lungs:            Coarse and diminished but otherwise clear  Chest wall: No tenderness  Heart::  Regular rate and rhythm, S1 and S2 normal, 1/6 holosystolic murmur.  No rub or gallop  Abdomen: Soft, nontender, nondistended bowel sounds active  Extremities: No cyanosis, clubbing.  Edema noted  Pulses:  2+ and symmetric all extremities  Skin:  No rashes or lesions  Neuro/psych: A&O x3. CN II through XII are grossly intact with appropriate affect

## 2024-08-09 NOTE — THERAPY EVALUATION
Patient Name: Heladio Clark  : 1970    MRN: [id number]                              Today's Date: 2024       Admit Date: 2024    Visit Dx:     ICD-10-CM ICD-9-CM   1. Coronary artery disease involving native coronary artery of native heart without angina pectoris  I25.10 414.01   2. Aneurysm of ascending aorta without rupture  I71.21 441.2     Patient Active Problem List   Diagnosis    CAD (coronary artery disease)    HLD (hyperlipidemia)    Ascending aortic aneurysm    Heart murmur    Abnormal nuclear stress test    Coronary heart disease     Past Medical History:   Diagnosis Date    Hyperlipidemia      Past Surgical History:   Procedure Laterality Date    CARDIAC CATHETERIZATION N/A 2024    Procedure: Left Heart Cath possible PCI;  Surgeon: Wilmer John MD;  Location: [facility] CATH INVASIVE LOCATION;  Service: Cardiovascular;  Laterality: N/A;    TONSILLECTOMY        General Information       Row Name 24 1251          Physical Therapy Time and Intention    Document Type evaluation  -RR     Mode of Treatment physical therapy  -       Row Name 24 1251          General Information    Patient Profile Reviewed yes  -RR     Prior Level of Function independent:  working full time as a  at [employer]  -     Existing Precautions/Restrictions sternal  Sternal/heart hugger; Pacer wires, A line, IJ, 2 chest tubes in place at the time of eval  -RR       Row Name 24 1251          Living Environment    People in Home spouse  who works full time  -       Row Name 24 1251          Home Main Entrance    Number of Stairs, Main Entrance three  -RR     Stair Railings, Main Entrance railings safe and in good condition  -RR       Row Name 24 1251          Stairs Within Home, Primary    Number of Stairs, Within Home, Primary none  -RR       Row Name 24 1251          Cognition    Orientation Status (Cognition) oriented x 4  -RR       Row Name 24 1251           Safety Issues, Functional Mobility    Impairments Affecting Function (Mobility) balance;endurance/activity tolerance;strength;shortness of breath  -RR               User Key  (r) = Recorded By, (t) = Taken By, (c) = Cosigned By      Initials Name Provider Type    RR Valeri Pyle PT Physical Therapist                   Mobility       Row Name 08/09/24 1255          Bed Mobility    Bed Mobility --  patient found up in chair; bed mobility not completed; discussed technique  -RR       Row Name 08/09/24 1255          Bed-Chair Transfer    Bed-Chair [place] (Transfers) moderate assist (50% patient effort);2 person assist  -RR     Comment, (Bed-Chair Transfer) min-modA to CTS with cues for sternal precautions and strategy; assist of 2nd for safety and management of environment  -RR       Row Name 08/09/24 1255          Sit-Stand Transfer    Sit-Stand [place] (Transfers) moderate assist (50% patient effort)  -RR     Assistive Device (Sit-Stand Transfers) walker, front-wheeled  -RR       Row Name 08/09/24 1255          Gait/Stairs (Locomotion)    [place] Level (Gait) moderate assist (50% patient effort);2 person assist  -RR     Assistive Device (Gait) walker, front-wheeled  -RR     Patient was able to Ambulate yes  -RR     Distance in Feet (Gait) 60  -RR     Deviations/Abnormal Patterns (Gait) mateus decreased  cues for pacing and avoiding pressure through UE; vitals WNL throughout  -RR     [place] Level (Stairs) not tested  -RR               User Key  (r) = Recorded By, (t) = Taken By, (c) = Cosigned By      Initials Name Provider Type    RR Valeri Pyle PT Physical Therapist                   Obj/Interventions       Row Name 08/09/24 1302          Range of Motion Comprehensive    General Range of Motion no range of motion deficits identified  -       Row Name 08/09/24 1302          Strength Comprehensive (MMT)    General Manual Muscle Testing (MMT) Assessment no strength deficits  identified  -RR       Row Name 08/09/24 1302          Sensory Assessment (Somatosensory)    Sensory Assessment (Somatosensory) sensation intact  -RR               User Key  (r) = Recorded By, (t) = Taken By, (c) = Cosigned By      Initials Name Provider Type    RR Valeri Pyle, PT Physical Therapist                   Goals/Plan       Row Name 08/09/24 1307          Bed Mobility Goal 1 (PT)    Activity/Assistive Device (Bed Mobility Goal 1, PT) bed mobility activities, all  -RR     [place] Level/Cues Needed (Bed Mobility Goal 1, PT) modified independence  -RR     Time Frame (Bed Mobility Goal 1, PT) long term goal (LTG);2 weeks  -RR       Row Name 08/09/24 1307          Transfer Goal 1 (PT)    Activity/Assistive Device (Transfer Goal 1, PT) transfers, all  -RR     [place] Level/Cues Needed (Transfer Goal 1, PT) independent  -RR     Time Frame (Transfer Goal 1, PT) long term goal (LTG);2 weeks  -RR       Row Name 08/09/24 1307          Gait Training Goal 1 (PT)    Activity/Assistive Device (Gait Training Goal 1, PT) gait (walking locomotion)  -RR     [place] Level (Gait Training Goal 1, PT) modified independence  -RR     Distance (Gait Training Goal 1, PT) 200  -RR     Time Frame (Gait Training Goal 1, PT) long term goal (LTG);2 weeks  -RR       Row Name 08/09/24 1307          Stairs Goal 1 (PT)    Activity/Assistive Device (Stairs Goal 1, PT) stairs, all skills  -RR     [place] Level/Cues Needed (Stairs Goal 1, PT) modified independence  -RR     Number of Stairs (Stairs Goal 1, PT) 4 with rail  -RR     Time Frame (Stairs Goal 1, PT) long term goal (LTG);2 weeks  -RR       Row Name 08/09/24 1307          Patient Education Goal (PT)    Activity (Patient Education Goal, PT) independent with functional applicaiton of sternal precautions  -RR     [place]/Cues/Accuracy (Memory Goal 2, PT) independent  -RR     Time Frame (Patient Education Goal, PT) long term goal (LTG);2 weeks  -RR                User Key  (r) = Recorded By, (t) = Taken By, (c) = Cosigned By      Initials Name Provider Type    RR Valeri Pyle, PT Physical Therapist                   Clinical Impression       Row Name 08/09/24 1302          Pain    Pretreatment Pain Rating 8/10  -RR     Posttreatment Pain Rating 8/10  -RR     Pain Location --  chest tube sites with breathing  -RR     Pain Intervention(s) Repositioned  -RR       Row Name 08/09/24 1302          Plan of Care Review    Plan of Care Reviewed With patient  -RR     Outcome Evaluation 54-year-old male who was evaluated in our office by OP Cardiology; he was also noted to have a 4.4 cm mid ascending thoracic aneurysm.  Exercise myocardial perfusion study was performed with hypertensive response and nondiagnostic EKG changes with upsloping ST segment depression with exercise.   Heart catheterization was subsequently performed 8-2-24: with severe multivessel CAD; he was determined to be a candidate for CABG with was completed 8/8/24.  In his normal state he resides in a [facility] with [address] with his spouse.  Both he and his spouse work full time.  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  He present POD1 from CABG with impaired fucntional independence with gait and transfers, limited activity tolerance, and elevated pain.  He requires modA for sit<>stand and min-modA for gait x60 feet with second to assist with environmnet.  He is significantly below his baseline but anticipate he will quickly return to independent mobility and safely to his home environment with skilled PT to address progressive mobility training, functional endurance, and fucntional transfers with sternal precautions.  Initiated education on functional application of sternal precautions and he voiced understanding but needs reinforcement.  He tolerated his initial bout of mobility/PT with vitals WNL and no adverse events.  Recommend home with spouse once medically  appropriate.  -RR       Row Name 08/09/24 1302          Therapy Assessment/Plan (PT)    Patient/Family Therapy Goals Statement (PT) to return home  -RR     Rehab Potential (PT) good, to achieve stated therapy goals  -RR     Criteria for Skilled Interventions Met (PT) yes;meets criteria;skilled treatment is necessary  -RR     Therapy Frequency (PT) 5 times/wk  -RR     Predicted Duration of Therapy Intervention (PT) d/c  -RR       Row Name 08/09/24 1302          Positioning and Restraints    Pre-Treatment Position sitting in chair/recliner  -RR     Post Treatment Position chair  -RR     In Chair call light within reach;exit alarm on  -RR               User Key  (r) = Recorded By, (t) = Taken By, (c) = Cosigned By      Initials Name Provider Type    Valeri Messina PT Physical Therapist                   Outcome Measures       Row Name 08/09/24 1308          How much help from another person do you currently need...    Turning from your back to your side while in flat bed without using bedrails? 2  -RR     Moving from lying on back to sitting on the side of a flat bed without bedrails? 2  -RR     Moving to and from a bed to a chair (including a wheelchair)? 2  -RR     Standing up from a chair using your arms (e.g., wheelchair, bedside chair)? 2  -RR     Climbing 3-5 steps with a railing? 3  -RR     To walk in hospital room? 3  -RR     AM-PAC 6 Clicks Score (PT) 14  -RR     Highest Level of Mobility Goal 4 --> Transfer to chair/commode  -RR               User Key  (r) = Recorded By, (t) = Taken By, (c) = Cosigned By      Initials Name Provider Type    Valeri Messina PT Physical Therapist                                 Physical Therapy Education       Title: PT OT SLP Therapies (Done)       Topic: Physical Therapy (Done)       Point: Mobility training (Done)       Learning Progress Summary             Patient Acceptance, TB,E, VU by RR at 8/9/2024 1309                         Point: Home exercise program  (Done)       Learning Progress Summary             Patient Acceptance, TB,E, VU by RR at 8/9/2024 1309                         Point: Precautions (Done)       Learning Progress Summary             Patient Acceptance, TB,E, VU by RR at 8/9/2024 1309                                         User Key       Initials Effective Dates Name Provider Type Discipline    RR 07/01/24 -  Valeri Pyle, PT Physical Therapist PT                  PT Recommendation and Plan     Plan of Care Reviewed With: patient  Outcome Evaluation: 54-year-old male who was evaluated in our office by OP Cardiology; he was also noted to have a 4.4 cm mid ascending thoracic aneurysm.  Exercise myocardial perfusion study was performed with hypertensive response and nondiagnostic EKG changes with upsloping ST segment depression with exercise.   Heart catheterization was subsequently performed 8-2-24: with severe multivessel CAD; he was determined to be a candidate for CABG with was completed 8/8/24.  In his normal state he resides in a [facility] with [address] with his spouse.  Both he and his spouse work full time.  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  He present POD1 from CABG with impaired fucntional independence with gait and transfers, limited activity tolerance, and elevated pain.  He requires modA for sit<>stand and min-modA for gait x60 feet with second to assist with environmnet.  He is significantly below his baseline but anticipate he will quickly return to independent mobility and safely to his home environment with skilled PT to address progressive mobility training, functional endurance, and fucntional transfers with sternal precautions.  Initiated education on functional application of sternal precautions and he voiced understanding but needs reinforcement.  He tolerated his initial bout of mobility/PT with vitals WNL and no adverse events.  Recommend home with spouse once medically appropriate.      Time Calculation:         PT Charges       Row Name 08/09/24 1309             Time Calculation    Start Time 1009  -RR      Stop Time 1031  -RR      Time Calculation (min) 22 min  -RR      PT Received On 08/09/24  -RR      PT - Next Appointment 08/10/24  -RR      PT Goal Re-Cert Due Date 08/23/24  -RR                User Key  (r) = Recorded By, (t) = Taken By, (c) = Cosigned By      Initials Name Provider Type    RR Valeri Pyle, PT Physical Therapist                  Therapy Charges for Today       Code Description Service Date Service Provider Modifiers Qty    21869440623 HC PT EVAL MOD COMPLEXITY 4 8/9/2024 Valeri Pyle, PT GP 1            PT G-Codes  AM-PAC 6 Clicks Score (PT): 14  PT Discharge Summary  Anticipated Discharge Disposition (PT): home, home with assist    Valeri Pyle, KASSANDRA  8/9/2024

## 2024-08-09 NOTE — PLAN OF CARE
Goal Outcome Evaluation:  Plan of Care Reviewed With: patient           Outcome Evaluation: 54-year-old male who was evaluated in our office by OP Cardiology; he was also noted to have a 4.4 cm mid ascending thoracic aneurysm.  Exercise myocardial perfusion study was performed with hypertensive response and nondiagnostic EKG changes with upsloping ST segment depression with exercise.   Heart catheterization was subsequently performed 8-2-24: with severe multivessel CAD; he was determined to be a candidate for CABG with was completed 8/8/24.  In his normal state he resides in a [facility] with [address] with his spouse.  Both he and his spouse work full time.  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  He present POD1 from CABG with impaired fucntional independence with gait and transfers, limited activity tolerance, and elevated pain.  He requires modA for sit<>stand and min-modA for gait x60 feet with second to assist with environmnet.  He is significantly below his baseline but anticipate he will quickly return to independent mobility and safely to his home environment with skilled PT to address progressive mobility training, functional endurance, and fucntional transfers with sternal precautions.  Initiated education on functional application of sternal precautions and he voiced understanding but needs reinforcement.  He tolerated his initial bout of mobility/PT with vitals WNL and no adverse events.  Recommend home with spouse once medically appropriate.      Anticipated Discharge Disposition (PT): home, home with assist

## 2024-08-09 NOTE — CONSULTS
Diabetes Education    Patient Name:  Heladio Clark  YOB: 1970  MRN: [id number]  Admit Date:  8/8/2024        MD consult status post CABG with A1c result 6.23% on 8/9/2024. A1c info sheet given with discussion on in pre-diabetes range. Patient stated he was told he had pre-diabetes back in June but was already drinking diet soda and water. Handout given on pre-diabetes and offering of class for pre-diabetes at the [facility]. Discussed the importance of exercise in blood sugar control. Asked patient if he would be interested in checking his blood sugar and he said yes. Handout given with discussion on the [employer] glucometer for $9 and costs of testing supplies. Explained to patient the importance of washing hands prior to checking blood sugar and pricking sides of fingers where less sensitive. Also explained how blood sugars can be higher post surgery and the importance of good blood sugar control in the healing process. Patient has no further questions or concerns related to diabetes at this time.      Electronically signed by:  Anni Boykin RN  08/09/24 15:35 EDT

## 2024-08-10 ENCOUNTER — APPOINTMENT (OUTPATIENT)
Dept: GENERAL RADIOLOGY | Facility: HOSPITAL | Age: 54
DRG: 236 | End: 2024-08-10
Payer: COMMERCIAL

## 2024-08-10 LAB
ANION GAP SERPL CALCULATED.3IONS-SCNC: 8.2 MMOL/L (ref 5–15)
BUN SERPL-MCNC: 13 MG/DL (ref 6–20)
BUN/CREAT SERPL: 13.1 (ref 7–25)
CALCIUM SPEC-SCNC: 8.9 MG/DL (ref 8.6–10.5)
CHLORIDE SERPL-SCNC: 99 MMOL/L (ref 98–107)
CO2 SERPL-SCNC: 27.8 MMOL/L (ref 22–29)
CREAT SERPL-MCNC: 0.99 MG/DL (ref 0.76–1.27)
DEPRECATED RDW RBC AUTO: 44.1 FL (ref 37–54)
EGFRCR SERPLBLD CKD-EPI 2021: 91.1 ML/MIN/1.73
ERYTHROCYTE [DISTWIDTH] IN BLOOD BY AUTOMATED COUNT: 13.4 % (ref 12.3–15.4)
GLUCOSE BLDC GLUCOMTR-MCNC: 116 MG/DL (ref 70–105)
GLUCOSE BLDC GLUCOMTR-MCNC: 118 MG/DL (ref 70–105)
GLUCOSE BLDC GLUCOMTR-MCNC: 125 MG/DL (ref 70–105)
GLUCOSE BLDC GLUCOMTR-MCNC: 128 MG/DL (ref 70–105)
GLUCOSE BLDC GLUCOMTR-MCNC: 131 MG/DL (ref 70–105)
GLUCOSE BLDC GLUCOMTR-MCNC: 142 MG/DL (ref 70–105)
GLUCOSE BLDC GLUCOMTR-MCNC: 142 MG/DL (ref 70–105)
GLUCOSE BLDC GLUCOMTR-MCNC: 159 MG/DL (ref 70–105)
GLUCOSE BLDC GLUCOMTR-MCNC: 162 MG/DL (ref 70–105)
GLUCOSE BLDC GLUCOMTR-MCNC: 166 MG/DL (ref 70–105)
GLUCOSE BLDC GLUCOMTR-MCNC: 169 MG/DL (ref 70–105)
GLUCOSE SERPL-MCNC: 139 MG/DL (ref 65–99)
HCT VFR BLD AUTO: 39.6 % (ref 37.5–51)
HGB BLD-MCNC: 12.6 G/DL (ref 13–17.7)
MCH RBC QN AUTO: 28.4 PG (ref 26.6–33)
MCHC RBC AUTO-ENTMCNC: 31.8 G/DL (ref 31.5–35.7)
MCV RBC AUTO: 89.4 FL (ref 79–97)
PLATELET # BLD AUTO: 239 10*3/MM3 (ref 140–450)
PMV BLD AUTO: 8.8 FL (ref 6–12)
POTASSIUM SERPL-SCNC: 4.3 MMOL/L (ref 3.5–5.2)
QT INTERVAL: 371 MS
QT INTERVAL: 373 MS
QTC INTERVAL: 411 MS
QTC INTERVAL: 411 MS
RBC # BLD AUTO: 4.43 10*6/MM3 (ref 4.14–5.8)
SODIUM SERPL-SCNC: 135 MMOL/L (ref 136–145)
WBC NRBC COR # BLD AUTO: 17.66 10*3/MM3 (ref 3.4–10.8)

## 2024-08-10 PROCEDURE — 25010000002 CEFAZOLIN PER 500 MG: Performed by: NURSE PRACTITIONER

## 2024-08-10 PROCEDURE — 93005 ELECTROCARDIOGRAM TRACING: CPT | Performed by: NURSE PRACTITIONER

## 2024-08-10 PROCEDURE — 85027 COMPLETE CBC AUTOMATED: CPT | Performed by: NURSE PRACTITIONER

## 2024-08-10 PROCEDURE — 99232 SBSQ HOSP IP/OBS MODERATE 35: CPT | Performed by: INTERNAL MEDICINE

## 2024-08-10 PROCEDURE — 25010000002 ENOXAPARIN PER 10 MG: Performed by: NURSE PRACTITIONER

## 2024-08-10 PROCEDURE — 80048 BASIC METABOLIC PNL TOTAL CA: CPT | Performed by: NURSE PRACTITIONER

## 2024-08-10 PROCEDURE — 71045 X-RAY EXAM CHEST 1 VIEW: CPT

## 2024-08-10 PROCEDURE — 25010000002 FUROSEMIDE PER 20 MG

## 2024-08-10 PROCEDURE — 93010 ELECTROCARDIOGRAM REPORT: CPT | Performed by: INTERNAL MEDICINE

## 2024-08-10 PROCEDURE — 25010000002 MORPHINE PER 10 MG: Performed by: NURSE PRACTITIONER

## 2024-08-10 PROCEDURE — 82948 REAGENT STRIP/BLOOD GLUCOSE: CPT

## 2024-08-10 RX ORDER — FUROSEMIDE 10 MG/ML
40 INJECTION INTRAMUSCULAR; INTRAVENOUS DAILY
Status: COMPLETED | OUTPATIENT
Start: 2024-08-10 | End: 2024-08-12

## 2024-08-10 RX ADMIN — SODIUM CHLORIDE 2 G: 900 INJECTION INTRAVENOUS at 02:25

## 2024-08-10 RX ADMIN — OXYCODONE 5 MG: 5 TABLET ORAL at 23:56

## 2024-08-10 RX ADMIN — CYCLOBENZAPRINE 10 MG: 10 TABLET, FILM COATED ORAL at 09:20

## 2024-08-10 RX ADMIN — ENOXAPARIN SODIUM 40 MG: 100 INJECTION SUBCUTANEOUS at 18:38

## 2024-08-10 RX ADMIN — POLYETHYLENE GLYCOL 3350 17 G: 17 POWDER, FOR SOLUTION ORAL at 20:48

## 2024-08-10 RX ADMIN — Medication 12.5 MG: at 08:53

## 2024-08-10 RX ADMIN — ATORVASTATIN CALCIUM 40 MG: 40 TABLET, FILM COATED ORAL at 20:48

## 2024-08-10 RX ADMIN — OXYCODONE 5 MG: 5 TABLET ORAL at 00:33

## 2024-08-10 RX ADMIN — SENNOSIDES AND DOCUSATE SODIUM 2 TABLET: 50; 8.6 TABLET ORAL at 20:48

## 2024-08-10 RX ADMIN — CHLORHEXIDINE GLUCONATE, 0.12% ORAL RINSE 15 ML: 1.2 SOLUTION DENTAL at 20:48

## 2024-08-10 RX ADMIN — CYCLOBENZAPRINE 10 MG: 10 TABLET, FILM COATED ORAL at 00:33

## 2024-08-10 RX ADMIN — MORPHINE SULFATE 2 MG: 2 INJECTION, SOLUTION INTRAMUSCULAR; INTRAVENOUS at 04:40

## 2024-08-10 RX ADMIN — HYDROCODONE BITARTRATE AND ACETAMINOPHEN 1 TABLET: 5; 325 TABLET ORAL at 09:20

## 2024-08-10 RX ADMIN — ASPIRIN 81 MG: 81 TABLET, COATED ORAL at 08:53

## 2024-08-10 RX ADMIN — MUPIROCIN 1 APPLICATION: 20 OINTMENT TOPICAL at 20:48

## 2024-08-10 RX ADMIN — FUROSEMIDE 40 MG: 10 INJECTION, SOLUTION INTRAMUSCULAR; INTRAVENOUS at 08:54

## 2024-08-10 RX ADMIN — SENNOSIDES AND DOCUSATE SODIUM 2 TABLET: 50; 8.6 TABLET ORAL at 08:53

## 2024-08-10 RX ADMIN — HYDROCODONE BITARTRATE AND ACETAMINOPHEN 1 TABLET: 5; 325 TABLET ORAL at 15:44

## 2024-08-10 RX ADMIN — Medication 12.5 MG: at 20:48

## 2024-08-10 RX ADMIN — MUPIROCIN 1 APPLICATION: 20 OINTMENT TOPICAL at 08:53

## 2024-08-10 RX ADMIN — POLYETHYLENE GLYCOL 3350 17 G: 17 POWDER, FOR SOLUTION ORAL at 08:53

## 2024-08-10 RX ADMIN — PANTOPRAZOLE SODIUM 40 MG: 40 TABLET, DELAYED RELEASE ORAL at 05:00

## 2024-08-10 RX ADMIN — CHLORHEXIDINE GLUCONATE, 0.12% ORAL RINSE 15 ML: 1.2 SOLUTION DENTAL at 08:54

## 2024-08-10 NOTE — PROGRESS NOTES
" LOS: 2 days   Patient Care Team:  Dilcia Spaulding APRN as PCP - General (Family Medicine)    Chief Complaint:       Subjective      Vital Signs  Temp:  [98.4 °F (36.9 °C)-99.9 °F (37.7 °C)] 98.4 °F (36.9 °C)  Heart Rate:  [71-80] 71  Resp:  [17-22] 17  BP: (108-130)/(65-85) 120/82  Body mass index is 34.39 kg/m².    Intake/Output Summary (Last 24 hours) at 8/10/2024 0951  Last data filed at 8/10/2024 0600  Gross per 24 hour   Intake 1809 ml   Output 1570 ml   Net 239 ml     No intake/output data recorded.      Wt Readings from Last 3 Encounters:   08/10/24 106 kg (233 lb)   08/05/24 104 kg (228 lb 9.6 oz)   08/02/24 102 kg (225 lb 15.5 oz)         Objective:  Vital signs: (most recent): Blood pressure 120/82, pulse 71, temperature 98.4 °F (36.9 °C), temperature source Oral, resp. rate 17, height 175.3 cm (69.02\"), weight 106 kg (233 lb), SpO2 93%.                Results Review:        WBC No results found for: \"WBCS\"   HGB Hemoglobin   Date Value Ref Range Status   08/10/2024 12.6 (L) 13.0 - 17.7 g/dL Final   08/09/2024 11.9 (L) 13.0 - 17.7 g/dL Final   08/08/2024 14.4 12.0 - 17.0 g/dL Final   08/08/2024 14.1 12.0 - 17.0 g/dL Final   08/08/2024 13.7 12.0 - 17.0 g/dL Final   08/08/2024 14.1 12.0 - 17.0 g/dL Final   08/08/2024 13.9 12.0 - 17.0 g/dL Final   08/08/2024 13.9 12.0 - 17.0 g/dL Final   08/08/2024 12.6 12.0 - 17.0 g/dL Final   08/08/2024 12.6 (L) 13.0 - 17.7 g/dL Final   08/08/2024 12.2 12.0 - 17.0 g/dL Final   08/08/2024 12.2 12.0 - 17.0 g/dL Final   08/08/2024 11.9 (L) 12.0 - 17.0 g/dL Final   08/08/2024 12.2 12.0 - 17.0 g/dL Final      HCT Hematocrit   Date Value Ref Range Status   08/10/2024 39.6 37.5 - 51.0 % Final   08/09/2024 37.5 37.5 - 51.0 % Final   08/08/2024 42 38 - 51 % Final   08/08/2024 41 38 - 51 % Final   08/08/2024 40 38 - 51 % Final   08/08/2024 41 38 - 51 % Final   08/08/2024 41 38 - 51 % Final   08/08/2024 41 38 - 51 % Final   08/08/2024 37 (L) 38 - 51 % Final   08/08/2024 39.3 37.5 " "- 51.0 % Final   08/08/2024 36 (L) 38 - 51 % Final   08/08/2024 36 (L) 38 - 51 % Final   08/08/2024 35 (L) 38 - 51 % Final   08/08/2024 36 (L) 38 - 51 % Final      Platelets No results found for: \"LABPLAT\"     PT/INR:    Protime   Date Value Ref Range Status   08/09/2024 11.8 (H) 9.6 - 11.7 Seconds Final   08/08/2024 11.9 (H) 9.6 - 11.7 Seconds Final   /  INR   Date Value Ref Range Status   08/09/2024 1.09 0.93 - 1.10 Final   08/08/2024 1.10 0.93 - 1.10 Final       Sodium Sodium   Date Value Ref Range Status   08/10/2024 135 (L) 136 - 145 mmol/L Final   08/09/2024 137 136 - 145 mmol/L Final   08/08/2024 138 136 - 145 mmol/L Final      Potassium Potassium   Date Value Ref Range Status   08/10/2024 4.3 3.5 - 5.2 mmol/L Final   08/09/2024 4.2 3.5 - 5.2 mmol/L Final   08/08/2024 4.1 3.5 - 5.2 mmol/L Final      Chloride Chloride   Date Value Ref Range Status   08/10/2024 99 98 - 107 mmol/L Final   08/09/2024 105 98 - 107 mmol/L Final   08/08/2024 108 (H) 98 - 107 mmol/L Final      Bicarbonate No results found for: \"PLASMABICARB\"   BUN BUN   Date Value Ref Range Status   08/10/2024 13 6 - 20 mg/dL Final   08/09/2024 12 6 - 20 mg/dL Final   08/08/2024 9 6 - 20 mg/dL Final      Creatinine Creatinine   Date Value Ref Range Status   08/10/2024 0.99 0.76 - 1.27 mg/dL Final   08/09/2024 0.93 0.76 - 1.27 mg/dL Final   08/08/2024 0.73 0.60 - 1.30 mg/dL Final     Comment:     Serial Number: [id number]:  [id number]   08/08/2024 0.77 0.60 - 1.30 mg/dL Final     Comment:     Serial Number: [id number]:  [id number]   08/08/2024 0.79 0.76 - 1.27 mg/dL Final      Calcium Calcium   Date Value Ref Range Status   08/10/2024 8.9 8.6 - 10.5 mg/dL Final   08/09/2024 8.2 (L) 8.6 - 10.5 mg/dL Final   08/08/2024 7.6 (L) 8.6 - 10.5 mg/dL Final      Magnesium Magnesium   Date Value Ref Range Status   08/09/2024 2.1 1.6 - 2.6 mg/dL Final   08/09/2024 1.7 1.6 - 2.6 mg/dL Final         .imag    aspirin, 81 mg, Oral, Daily  atorvastatin, 40 mg, Oral, " Nightly  chlorhexidine, 15 mL, Mouth/Throat, Q12H  enoxaparin, 40 mg, Subcutaneous, Daily  furosemide, 40 mg, Intravenous, Daily  metoprolol tartrate, 12.5 mg, Oral, Q12H  mupirocin, , Each Nare, BID  pantoprazole, 40 mg, Oral, Q AM  polyethylene glycol, 17 g, Oral, BID  senna-docusate sodium, 2 tablet, Oral, BID      insulin, 0-100 Units/hr, Last Rate: Stopped (08/08/24 1145)  sodium chloride, 30 mL/hr, Last Rate: 30 mL/hr (08/08/24 1508)            Coronary heart disease    CAD (coronary artery disease)    Ascending aortic aneurysm      Assessment & Plan    POD# 2 CABG. Doing well. On asa/statin/ add low dose bb.  Replete e-. Diuresed well.  Add oxycodone, pt max-ed on Tylenol and Flexeril.  Chest pain is okay.  DC central line and DC Pelaez. Anticipate home at discharge    Salvatore Domingo MD  08/10/24  09:51 EDT

## 2024-08-10 NOTE — PROGRESS NOTES
"  Cardiology Progress  Note      Patient Care Team:  Dilcia Spaulding APRN as PCP - General (Family Medicine)    PATIENT IDENTIFICATION  Name: Heladio Clark  Age: 54 y.o.  Sex: male  :  1970  MRN: [id number]      Length of stay:    LOS: 2 days           REASON FOR FOLLOW-UP:  Multivessel coronary artery disease  Status post three-vessel CABG      INTERVAL HISTORY  Patient seen and examined, chart and labs reviewed.  Patient was extubated 1830 yesterday, sitting in bedside chair.  He is alert and oriented x 3.  He does report significant sternal wound discomfort.  Rhythm sinus, /80.  Drips: IV magnesium replacement.  Plans to DC [place]/A-line today, add BB.      SUBJECTIVE    No issues overnight  Mild shortness of breath  Surgical pain noted  Modify goal-directed medical therapy  Gentle diuresis  Pain management    Discharge planning per CV surgery    REVIEW OF SYSTEMS:  Pertinent items are noted in HPI, all other systems reviewed and negative    OBJECTIVE   Hemoglobin 11.9    ASSESSMENT  Severe multivessel coronary artery disease status post CABG  Ascending aortic aneurysm-4.2 cm per CTA 2024  Mixed hyperlipidemia  Obstructive sleep apnea  Significant paternal family history of premature coronary disease  Hyperglycemia      RECOMMENDATIONS  Continue current CTS postop protocol  Continue CV supportive care  Monitor rhythm and hemodynamics closely  Monitor and replete electrolytes as needed  Activity as tolerated  Lines and tubes managed per CV surgery, central line out and Pelaez out  No anginal chest pain  Aspirin statin low-dose beta-blocker on board, electrolytes replaced    Continue to follow    Marquis Bonilla MD, PhD          CHF Guideline Directed Medical Therapy  Beta Blocker:   ARNI/ACE/ARB:   SGLT 2 inhibitors:   Diuretics:   Aldosterone Antagonist:   Vasodilators & Nitrates:       Vital Signs  Visit Vitals  /78   Pulse 73   Temp 98.4 °F (36.9 °C) (Oral)   Resp 18   Ht 175.3 cm (69.02\") " "  Wt 106 kg (233 lb)   SpO2 92%   BMI 34.39 kg/m²     Oxygen Therapy  SpO2: 92 %  Pulse Oximetry Type: Continuous  Device (Oxygen Therapy): nasal cannula  Flow (L/min): 2  Oxygen Concentration (%): 4  Flowsheet Rows      Flowsheet Row First Filed Value   Admission Height 175.3 cm (69.02\") Documented at 08/08/2024 1900   Admission Weight 105 kg (231 lb 6.4 oz) Documented at 08/08/2024 0500          Intake & Output (last 3 days)         08/06 0701  08/07 0700 08/07 0701 08/08 0700 08/08 0701 08/09 0700 08/09 0701  08/10 0700    P.O.   150     I.V. (mL/kg)   2843 (26.8)     Blood   135     IV Piggyback   250     Total Intake(mL/kg)   3378 (31.9)     Urine (mL/kg/hr)   2690 (1.1)     Emesis/NG output   0     Stool   0     Blood   150     Chest Tube   740 70    Wound   0     Total Output   3580 70    Net   -202 -70            Stool Unmeasured Occurrence   0 x     Emesis Unmeasured Occurrence   0 x           Lines, Drains & Airways       Active LDAs       Name Placement date Placement time Site Days    CVC Double Lumen 08/08/24 Right Internal jugular 08/08/24  0705  created via procedure documentation  Internal jugular  1    Peripheral IV 08/08/24 Distal;Posterior;Right Forearm 08/08/24  --  Forearm  1    Chest Tube Mediastinal 08/08/24  --  Mediastinal  1    Urethral Catheter Temperature probe 16 Fr. 08/08/24  --  -- 1    Y Chest Tube 1 and 2 Right Pleural 28 Fr. Left Pleural 28 Fr. 08/08/24  --  -- 1    Pacer Wires --  --  Atrial and Ventricular  --                           /78   Pulse 73   Temp 98.4 °F (36.9 °C) (Oral)   Resp 18   Ht 175.3 cm (69.02\")   Wt 106 kg (233 lb)   SpO2 92%   BMI 34.39 kg/m²   Intake/Output last 3 shifts:  I/O last 3 completed shifts:  In: 3302 [P.O.:1220; I.V.:2082]  Out: 3575 [Urine:3095; Chest Tube:480]  Intake/Output this shift:  I/O this shift:  In: -   Out: 1275 [Urine:1275]    PHYSICAL EXAM:    General: Alert, cooperative, no distress, appears stated " age  Head:  Normocephalic, atraumatic, mucous membranes moist  Eyes:  Conjunctivae/corneas clear, EOM's intact     Neck:  IJ right  Lungs: Diminished but clear to auscultation bilaterally, no wheezes, rhonchi or rales are noted  Chest wall: Sternal wound dry and intact  Heart::  Regular rate and rhythm, S1 and S2 normal, no murmur, rub or gallop  Abdomen: Soft, nontender, nondistended, bowel sounds active  Extremities: No cyanosis, clubbing, or edema   Pulses: 2+ and symmetric all extremities  Skin:  No rashes or lesions  Neuro/psych: A&O x3. CN II through XII are grossly intact with appropriate affect    Change from prior encounter    Scheduled Meds:      aspirin, 81 mg, Oral, Daily  atorvastatin, 40 mg, Oral, Nightly  chlorhexidine, 15 mL, Mouth/Throat, Q12H  enoxaparin, 40 mg, Subcutaneous, Daily  furosemide, 40 mg, Intravenous, Daily  metoprolol tartrate, 12.5 mg, Oral, Q12H  mupirocin, , Each Nare, BID  pantoprazole, 40 mg, Oral, Q AM  polyethylene glycol, 17 g, Oral, BID  senna-docusate sodium, 2 tablet, Oral, BID        Continuous Infusions:    insulin, 0-100 Units/hr, Last Rate: Stopped (08/08/24 1145)  sodium chloride, 30 mL/hr, Last Rate: 30 mL/hr (08/08/24 1508)        PRN Meds:      acetaminophen **OR** acetaminophen **OR** acetaminophen    Calcium Replacement - Follow Nurse / BPA Driven Protocol    cyclobenzaprine    dextrose    dextrose    glucagon (human recombinant)    hydrALAZINE    HYDROcodone-acetaminophen    Magnesium Cardiology Dose Replacement - Follow Nurse / BPA Driven Protocol    meperidine    Morphine **AND** naloxone    nitroglycerin    ondansetron    oxyCODONE    Phosphorus Replacement - Follow Nurse / BPA Driven Protocol    Potassium Replacement - Follow Nurse / BPA Driven Protocol        Results Review:     I reviewed the patient's new clinical results.    CBC    Results from last 7 days   Lab Units 08/10/24  0228 08/09/24  0253 08/08/24  1937 08/08/24  1816 08/08/24  1646  08/08/24  1540 08/08/24  1459 08/08/24  1219 08/08/24  1145 08/08/24  0749 08/05/24  0822   WBC 10*3/mm3 17.66* 12.39*  --   --   --   --   --   --  14.12*  --  10.11   HEMOGLOBIN g/dL 12.6* 11.9*  --   --   --   --   --   --  12.6*  --  13.4   HEMOGLOBIN, POC g/dL  --   --  14.4 14.1 13.7 14.1 13.9   < >  --    < >  --    PLATELETS 10*3/mm3 239 236  --   --   --   --   --   --  214  --  268    < > = values in this interval not displayed.     Cr Clearance Estimated Creatinine Clearance: 103.5 mL/min (by C-G formula based on SCr of 0.99 mg/dL).  Coag   Results from last 7 days   Lab Units 08/09/24  0253 08/08/24  1145 08/05/24  0822   INR  1.09 1.10 0.98   APTT seconds  --  31.9* 32.4*     HbA1C   Lab Results   Component Value Date    HGBA1C 6.23 (H) 08/05/2024     Blood Glucose   Glucose   Date/Time Value Ref Range Status   08/10/2024 1411 131 (H) 70 - 105 mg/dL Final     Comment:     Serial Number: [id number]:  [id number]   08/10/2024 1213 162 (H) 70 - 105 mg/dL Final     Comment:     Serial Number: [id number]:  [id number]   08/10/2024 1110 169 (H) 70 - 105 mg/dL Final     Comment:     Serial Number: [id number]:  [id number]   08/10/2024 1023 142 (H) 70 - 105 mg/dL Final     Comment:     Serial Number: [id number]:  [id number]   08/10/2024 0900 116 (H) 70 - 105 mg/dL Final     Comment:     Serial Number: [id number]:  [id number]   08/10/2024 0730 128 (H) 70 - 105 mg/dL Final     Comment:     Serial Number: [id number]:  [id number]   08/10/2024 0537 142 (H) 70 - 105 mg/dL Final     Comment:     Serial Number: [id number]:  [id number]   08/10/2024 0052 125 (H) 70 - 105 mg/dL Final     Comment:     Serial Number: [id number]:  [id number]     Infection     CMP   Results from last 7 days   Lab Units 08/10/24  0228 08/09/24  0253 08/08/24  1335 08/08/24  1219 08/08/24  1145 08/05/24  0902 08/05/24  0822   SODIUM mmol/L 135* 137  --   --  138  --  140   POTASSIUM mmol/L 4.3 4.2   "--   --  4.1  --  3.7   CHLORIDE mmol/L 99 105  --   --  108*  --  106   CO2 mmol/L 27.8 23.4  --   --  24.2  --  24.0   BUN mg/dL 13 12  --   --  9  --  10   CREATININE mg/dL 0.99 0.93 0.73 0.77 0.79 1.00 0.90   GLUCOSE mg/dL 139* 150*  --   --  129*  --  124*   ALBUMIN g/dL  --  3.7  --   --  3.4*  --  3.9   BILIRUBIN mg/dL  --   --   --   --   --   --  0.4   ALK PHOS U/L  --   --   --   --   --   --  92   AST (SGOT) U/L  --   --   --   --   --   --  16   ALT (SGPT) U/L  --   --   --   --   --   --  22     ABG    Results from last 7 days   Lab Units 08/08/24  1937 08/08/24  1816 08/08/24  1646 08/08/24  1540 08/08/24  1459 08/08/24  1335 08/08/24  1219   PH, ARTERIAL pH units 7.340* 7.352 7.356 7.282* 7.345* 7.363 7.326*   PCO2, ARTERIAL mm Hg 44.3 43.6 42.4 52.8* 44.1 37.8 43.2   PO2 ART mm Hg 110.7* 129.1* 107.5 104.4 94.5 94.1 86.9   O2 SATURATION ART % 98.0 98.8* 97.9 97.1 96.9 97.1 95.8   BASE EXCESS ART mmol/L -2.0* -1.6* -1.9* -2.5* -1.8* -3.5* -3.4*     UA    Results from last 7 days   Lab Units 08/05/24  0822   NITRITE UA  Negative   WBC UA /HPF 3-5*   BACTERIA UA /HPF None Seen   SQUAM EPITHEL UA /HPF 0-2     VERN  No results found for: \"POCMETH\", \"POCAMPHET\", \"POCBARBITUR\", \"POCBENZO\", \"POCCOCAINE\", \"POCOPIATES\", \"POCOXYCODO\", \"POCPHENCYC\", \"POCPROPOXY\", \"POCTHC\", \"POCTRICYC\"  Lysis Labs   Results from last 7 days   Lab Units 08/10/24  0228 08/09/24  0253 08/08/24  1937 08/08/24  1816 08/08/24  1646 08/08/24  1540 08/08/24  1459 08/08/24  1335 08/08/24  1219 08/08/24  1145 08/08/24  0749 08/05/24  0902 08/05/24  0822   INR   --  1.09  --   --   --   --   --   --   --  1.10  --   --  0.98   APTT seconds  --   --   --   --   --   --   --   --   --  31.9*  --   --  32.4*   HEMOGLOBIN g/dL 12.6* 11.9*  --   --   --   --   --   --   --  12.6*  --   --  13.4   HEMOGLOBIN, POC g/dL  --   --  14.4 14.1 13.7 14.1 13.9 13.9 12.6  --    < >  --   --    PLATELETS 10*3/mm3 239 236  --   --   --   --   --   --   --  " 214  --   --  268   CREATININE mg/dL 0.99 0.93  --   --   --   --   --  0.73 0.77 0.79  --  1.00 0.90    < > = values in this interval not displayed.     Radiology(recent) XR Chest 1 View    Result Date: 8/10/2024  Impression: 1.Low lung volumes with possible small pleural effusions and bibasilar opacities, as before. 2.Stable cardiomegaly. Electronically Signed: Marquis Zeenat  8/10/2024 11:58 AM EDT  Workstation ID: [id number]    XR Chest 1 View    Result Date: 8/9/2024  Impression: 1. Interval removal of [place]-Nia catheter. 2. History states removal of chest tube. No pneumothorax identified. 3. Cardiomegaly with central pulmonary vascular congestion. 4. Low lung volumes with probable bibasilar atelectasis, with some improvement in the right midlung. Electronically Signed: Humza Harden MD  8/9/2024 2:20 PM EDT  Workstation ID: [id number]    XR Chest 1 View    Result Date: 8/9/2024  Impression: 1. Interval extubation and removal of esophagogastric tube. 2. Stable right IJ [place]-Nia catheter. 3. No pneumothorax. 4. Mild right perihilar and linear bibasilar atelectasis unchanged. Electronically Signed: Albert Martin MD  8/9/2024 7:07 AM EDT  Workstation ID: [id number]             X-rays, labs reviewed personally by physician.    ECG/EMG Results (most recent)       Procedure Component Value Units Date/Time    Telemetry Scan [[id number]] Resulted: 08/08/24     Updated: 08/08/24 1702    Telemetry Scan [[id number]] Resulted: 08/08/24     Updated: 08/08/24 2110    Telemetry Scan [[id number]] Resulted: 08/08/24     Updated: 08/08/24 2110    Telemetry Scan [[id number]] Resulted: 08/08/24     Updated: 08/08/24 2137    Telemetry Scan [[id number]] Resulted: 08/08/24     Updated: 08/09/24 0246    Telemetry Scan [[id number]] Resulted: 08/08/24     Updated: 08/09/24 0556    Telemetry Scan [[id number]] Resulted: 08/08/24     Updated: 08/09/24 0824    Telemetry Scan [[id number]] Resulted: 08/08/24     Updated: 08/09/24 1345    Telemetry Scan  [[id number]] Resulted: 08/08/24     Updated: 08/09/24 1723    Telemetry Scan [[id number]] Resulted: 08/08/24     Updated: 08/09/24 2102    ECG 12 Lead Other; s/p CABG [[id number]] Collected: 08/09/24 0510     Updated: 08/09/24 2124     QT Interval 360 ms      QTC Interval 413 ms     Narrative:      HEART RATE=79  bpm  RR Dnodgxoo=102  ms  MO Vghzuqqv=109  ms  P Horizontal Axis=-25  deg  P Front Axis=58  deg  QRSD Interval=90  ms  QT Ojfjnmii=375  ms  OSqH=492  ms  QRS Axis=36  deg  T Wave Axis=18  deg  - ABNORMAL ECG -  Sinus rhythm  Probable  left atrial enlargement  When compared with ECG of 31-Jul-2024 13:16:55,  Significant rate increase  New or worsened ischemia or infarction  Electronically Signed By: Migel Keane ([facility]) 2024-08-09 21:18:29  Date and Time of Study:2024-08-09 05:10:47    ECG 12 Lead Other; s/p CABG [[id number]] Collected: 08/10/24 0451     Updated: 08/10/24 0451     QT Interval 371 ms      QTC Interval 411 ms     Narrative:      HEART RATE=73  bpm  RR Ztxkczgo=362  ms  MO Uddfafaw=682  ms  P Horizontal Axis=-14  deg  P Front Axis=55  deg  QRSD Interval=88  ms  QT Glhetnwb=060  ms  ZToN=760  ms  QRS Axis=30  deg  T Wave Axis=4  deg  - ABNORMAL ECG -  Sinus rhythm  Anterolateral infarct, acute (LAD)  Date and Time of Study:2024-08-10 04:51:14    ECG 12 Lead Other; s/p CABG [[id number]] Collected: 08/08/24 1809     Updated: 08/10/24 0605     QT Interval 373 ms      QTC Interval 411 ms     Narrative:      HEART RATE=73  bpm  RR Svzlpdsi=367  ms  MO Ifoeqmak=375  ms  P Horizontal Axis=48  deg  P Front Axis=56  deg  QRSD Interval=98  ms  QT Xsvwixlk=245  ms  JXfN=546  ms  QRS Axis=36  deg  T Wave Axis=10  deg  - ABNORMAL ECG -  Sinus rhythm  ST elevation, consider lateral injury  When compared with ECG of 31-Jul-2024 13:16:55,  No significant change  Date and Time of Study:2024-08-08 18:09:48    Telemetry Scan [[id number]] Resulted: 08/08/24     Updated: 08/10/24 0700    Telemetry Scan [[id number]] Resulted:  08/08/24     Updated: 08/10/24 0751    Telemetry Scan [[id number]] Resulted: 08/08/24     Updated: 08/10/24 1141    Telemetry Scan [[id number]] Resulted: 08/08/24     Updated: 08/10/24 1239              Medication Review:   I have reviewed the patient's current medication list  Scheduled Meds:aspirin, 81 mg, Oral, Daily  atorvastatin, 40 mg, Oral, Nightly  chlorhexidine, 15 mL, Mouth/Throat, Q12H  enoxaparin, 40 mg, Subcutaneous, Daily  furosemide, 40 mg, Intravenous, Daily  metoprolol tartrate, 12.5 mg, Oral, Q12H  mupirocin, , Each Nare, BID  pantoprazole, 40 mg, Oral, Q AM  polyethylene glycol, 17 g, Oral, BID  senna-docusate sodium, 2 tablet, Oral, BID      Continuous Infusions:insulin, 0-100 Units/hr, Last Rate: Stopped (08/08/24 1145)  sodium chloride, 30 mL/hr, Last Rate: 30 mL/hr (08/08/24 1508)      PRN Meds:.  acetaminophen **OR** acetaminophen **OR** acetaminophen    Calcium Replacement - Follow Nurse / BPA Driven Protocol    cyclobenzaprine    dextrose    dextrose    glucagon (human recombinant)    hydrALAZINE    HYDROcodone-acetaminophen    Magnesium Cardiology Dose Replacement - Follow Nurse / BPA Driven Protocol    meperidine    Morphine **AND** naloxone    nitroglycerin    ondansetron    oxyCODONE    Phosphorus Replacement - Follow Nurse / BPA Driven Protocol    Potassium Replacement - Follow Nurse / BPA Driven Protocol    Imaging:  Imaging Results (Last 72 Hours)       Procedure Component Value Units Date/Time    XR Chest 1 View [[id number]] Collected: 08/10/24 1156     Updated: 08/10/24 1200    Narrative:      XR CHEST 1 VW    Date of Exam: 8/10/2024 6:00 AM EDT    Indication: Post-Op Heart Surgery    Comparison:  8/9/2024    Findings:  Right IJ sheath remains. Status post median sternotomy. The heart appears enlarged. Low lung volumes. Possible small pleural effusions. Bibasilar opacities appear grossly unchanged. No definite pneumothorax.      Impression:      Impression:  1.Low lung volumes with  possible small pleural effusions and bibasilar opacities, as before.  2.Stable cardiomegaly.        Electronically Signed: Marquis Zeenat    8/10/2024 11:58 AM EDT    Workstation ID: [id number]    XR Chest 1 View [[id number]] Collected: 08/09/24 1417     Updated: 08/09/24 1422    Narrative:      XR CHEST 1 VW    Date of Exam: 8/9/2024 2:00 PM EDT    Indication: Chest tube removal    Comparison: 8/9/2024    Findings:  Sternotomy wires overlie the midline. Right IJ introducer sheath again noted. [place]-Nia catheter has been removed. No pneumothorax identified. There are low lung volumes. Cardiomegaly is noted. There is central pulmonary vascular congestion and bibasilar   atelectasis, similar to prior. Some interval improvement of right midlung atelectasis.      Impression:      Impression:    1. Interval removal of [place]-Nia catheter.  2. History states removal of chest tube. No pneumothorax identified.  3. Cardiomegaly with central pulmonary vascular congestion.  4. Low lung volumes with probable bibasilar atelectasis, with some improvement in the right midlung.       Electronically Signed: Humza Harden MD    8/9/2024 2:20 PM EDT    Workstation ID: [id number]    XR Chest 1 View [[id number]] Collected: 08/09/24 0706     Updated: 08/09/24 0709    Narrative:      XR CHEST 1 VW    Date of Exam: 8/9/2024 6:11 AM EDT    Indication: Post-Op Heart Surgery    Comparison: 8/8/2024    Findings:  Status post sternotomy. The patient has been extubated. The esophagogastric tube is been removed. There is a stable right IJ [place]-Nia catheter with tip overlying the main pulmonary arterial outflow. Mild right perihilar and linear bibasilar atelectasis   unchanged. Negative for pneumothorax. No significant effusion.      Impression:      Impression:  1. Interval extubation and removal of esophagogastric tube.  2. Stable right IJ [place]-Nia catheter.  3. No pneumothorax.  4. Mild right perihilar and linear bibasilar atelectasis  unchanged.        Electronically Signed: Albert Martin MD    8/9/2024 7:07 AM EDT    Workstation ID: [id number]    XR Abdomen KUB [[id number]] Collected: 08/08/24 1219     Updated: 08/08/24 1222    Narrative:      XR ABDOMEN KUB    Date of Exam: 8/8/2024 12:00 PM EDT    Indication: OG placement verification    Comparison: None available.    Findings:  Single supine image. There is considerable artifact over the abdomen. There is an orogastric tube with its tip in the distal stomach. Visualized bowel is nondilated.      Impression:      Impression:  Orogastric tube tip in the distal stomach.      Electronically Signed: Suyapa Fitzgerald MD    8/8/2024 12:20 PM EDT    Workstation ID: [id number]    XR Chest 1 View [[id number]] Collected: 08/08/24 1219     Updated: 08/08/24 1222    Narrative:      XR CHEST 1 VW    Date of Exam: 8/8/2024 12:00 PM EDT    Indication: Post-Op Check Line & Tube Placement    Comparison: CT chest 8/5/2024, PA and lateral chest 8/5/2024    Findings:  Median sternotomy changes are present. Heart size is normal. Linear subsegmental atelectasis is seen in a bibasilar distribution. No pleural effusion or pneumothorax is identified. Right presumed bibasilar chest tubes projecting over the diaphragmatic   margins.. NG tube extends below the diaphragm with the tip not included in the field-of-view. Right IJ approach [place]-Nia catheter tip extends to the main pulmonary artery level. ET tube tip projects approximately 3.8 cm above the level of the estelita.      Impression:      Impression:    1. Support line and tube placements as described. No visible pneumothorax.  2. Bibasilar linear subsegmental atelectasis.      Electronically Signed: Roxanne Schwarz MD    8/8/2024 12:20 PM EDT    Workstation ID: [id number]              Marquis Bonilla MD  08/10/24  15:04 EDT

## 2024-08-10 NOTE — CASE MANAGEMENT/SOCIAL WORK
Continued Stay Note  JED Montoya     Patient Name: Heladio Clark  MRN: [id number]  Today's Date: 8/10/2024    Admit Date: 8/8/2024    Plan: Return home with spouse. CABG 8/8/24   Discharge Plan       Row Name 08/10/24 1838       Plan    Plan Return home with spouse. CABG 8/8/24    Plan Comments PT/OT recommending safe to return home with assistance.

## 2024-08-11 LAB
ANION GAP SERPL CALCULATED.3IONS-SCNC: 10.5 MMOL/L (ref 5–15)
BUN SERPL-MCNC: 13 MG/DL (ref 6–20)
BUN/CREAT SERPL: 15.3 (ref 7–25)
CALCIUM SPEC-SCNC: 8.8 MG/DL (ref 8.6–10.5)
CHLORIDE SERPL-SCNC: 98 MMOL/L (ref 98–107)
CO2 SERPL-SCNC: 27.5 MMOL/L (ref 22–29)
CREAT SERPL-MCNC: 0.85 MG/DL (ref 0.76–1.27)
DEPRECATED RDW RBC AUTO: 43.3 FL (ref 37–54)
EGFRCR SERPLBLD CKD-EPI 2021: 103.9 ML/MIN/1.73
ERYTHROCYTE [DISTWIDTH] IN BLOOD BY AUTOMATED COUNT: 13.2 % (ref 12.3–15.4)
GLUCOSE BLDC GLUCOMTR-MCNC: 110 MG/DL (ref 70–105)
GLUCOSE BLDC GLUCOMTR-MCNC: 122 MG/DL (ref 70–105)
GLUCOSE BLDC GLUCOMTR-MCNC: 144 MG/DL (ref 70–105)
GLUCOSE BLDC GLUCOMTR-MCNC: 93 MG/DL (ref 70–105)
GLUCOSE SERPL-MCNC: 121 MG/DL (ref 65–99)
HCT VFR BLD AUTO: 39.2 % (ref 37.5–51)
HGB BLD-MCNC: 12.5 G/DL (ref 13–17.7)
MCH RBC QN AUTO: 28.3 PG (ref 26.6–33)
MCHC RBC AUTO-ENTMCNC: 31.9 G/DL (ref 31.5–35.7)
MCV RBC AUTO: 88.7 FL (ref 79–97)
PLATELET # BLD AUTO: 230 10*3/MM3 (ref 140–450)
PMV BLD AUTO: 8.8 FL (ref 6–12)
POTASSIUM SERPL-SCNC: 3.8 MMOL/L (ref 3.5–5.2)
POTASSIUM SERPL-SCNC: 3.8 MMOL/L (ref 3.5–5.2)
RBC # BLD AUTO: 4.42 10*6/MM3 (ref 4.14–5.8)
SODIUM SERPL-SCNC: 136 MMOL/L (ref 136–145)
WBC NRBC COR # BLD AUTO: 11.5 10*3/MM3 (ref 3.4–10.8)

## 2024-08-11 PROCEDURE — 80048 BASIC METABOLIC PNL TOTAL CA: CPT | Performed by: NURSE PRACTITIONER

## 2024-08-11 PROCEDURE — 25010000002 FUROSEMIDE PER 20 MG

## 2024-08-11 PROCEDURE — 84132 ASSAY OF SERUM POTASSIUM: CPT

## 2024-08-11 PROCEDURE — 25010000002 ENOXAPARIN PER 10 MG: Performed by: NURSE PRACTITIONER

## 2024-08-11 PROCEDURE — 85027 COMPLETE CBC AUTOMATED: CPT | Performed by: NURSE PRACTITIONER

## 2024-08-11 PROCEDURE — 99232 SBSQ HOSP IP/OBS MODERATE 35: CPT | Performed by: INTERNAL MEDICINE

## 2024-08-11 PROCEDURE — 82948 REAGENT STRIP/BLOOD GLUCOSE: CPT

## 2024-08-11 RX ORDER — NICOTINE POLACRILEX 4 MG
15 LOZENGE BUCCAL
Status: DISCONTINUED | OUTPATIENT
Start: 2024-08-11 | End: 2024-08-13 | Stop reason: HOSPADM

## 2024-08-11 RX ORDER — POTASSIUM CHLORIDE 20 MEQ/1
20 TABLET, EXTENDED RELEASE ORAL ONCE
Status: COMPLETED | OUTPATIENT
Start: 2024-08-11 | End: 2024-08-11

## 2024-08-11 RX ORDER — DEXTROSE MONOHYDRATE 25 G/50ML
25 INJECTION, SOLUTION INTRAVENOUS
Status: DISCONTINUED | OUTPATIENT
Start: 2024-08-11 | End: 2024-08-13 | Stop reason: HOSPADM

## 2024-08-11 RX ORDER — IBUPROFEN 600 MG/1
1 TABLET ORAL
Status: DISCONTINUED | OUTPATIENT
Start: 2024-08-11 | End: 2024-08-13 | Stop reason: HOSPADM

## 2024-08-11 RX ORDER — INSULIN LISPRO 100 [IU]/ML
2-7 INJECTION, SOLUTION INTRAVENOUS; SUBCUTANEOUS
Status: DISCONTINUED | OUTPATIENT
Start: 2024-08-11 | End: 2024-08-13 | Stop reason: HOSPADM

## 2024-08-11 RX ADMIN — CYCLOBENZAPRINE 10 MG: 10 TABLET, FILM COATED ORAL at 21:22

## 2024-08-11 RX ADMIN — Medication 12.5 MG: at 21:22

## 2024-08-11 RX ADMIN — MUPIROCIN 1 APPLICATION: 20 OINTMENT TOPICAL at 21:22

## 2024-08-11 RX ADMIN — Medication 12.5 MG: at 09:35

## 2024-08-11 RX ADMIN — ATORVASTATIN CALCIUM 40 MG: 40 TABLET, FILM COATED ORAL at 21:22

## 2024-08-11 RX ADMIN — ASPIRIN 81 MG: 81 TABLET, COATED ORAL at 09:35

## 2024-08-11 RX ADMIN — HYDROCODONE BITARTRATE AND ACETAMINOPHEN 1 TABLET: 5; 325 TABLET ORAL at 09:35

## 2024-08-11 RX ADMIN — HYDROCODONE BITARTRATE AND ACETAMINOPHEN 1 TABLET: 5; 325 TABLET ORAL at 04:59

## 2024-08-11 RX ADMIN — POTASSIUM CHLORIDE 20 MEQ: 1500 TABLET, EXTENDED RELEASE ORAL at 18:54

## 2024-08-11 RX ADMIN — FUROSEMIDE 40 MG: 10 INJECTION, SOLUTION INTRAMUSCULAR; INTRAVENOUS at 09:35

## 2024-08-11 RX ADMIN — EMPAGLIFLOZIN 10 MG: 10 TABLET, FILM COATED ORAL at 13:46

## 2024-08-11 RX ADMIN — POTASSIUM CHLORIDE 20 MEQ: 1500 TABLET, EXTENDED RELEASE ORAL at 07:30

## 2024-08-11 RX ADMIN — CHLORHEXIDINE GLUCONATE, 0.12% ORAL RINSE 15 ML: 1.2 SOLUTION DENTAL at 21:22

## 2024-08-11 RX ADMIN — PANTOPRAZOLE SODIUM 40 MG: 40 TABLET, DELAYED RELEASE ORAL at 05:00

## 2024-08-11 RX ADMIN — CYCLOBENZAPRINE 10 MG: 10 TABLET, FILM COATED ORAL at 09:40

## 2024-08-11 RX ADMIN — MUPIROCIN 1 APPLICATION: 20 OINTMENT TOPICAL at 09:35

## 2024-08-11 RX ADMIN — CHLORHEXIDINE GLUCONATE, 0.12% ORAL RINSE 15 ML: 1.2 SOLUTION DENTAL at 09:35

## 2024-08-11 RX ADMIN — HYDROCODONE BITARTRATE AND ACETAMINOPHEN 1 TABLET: 5; 325 TABLET ORAL at 21:22

## 2024-08-11 RX ADMIN — ENOXAPARIN SODIUM 40 MG: 100 INJECTION SUBCUTANEOUS at 18:54

## 2024-08-11 NOTE — PROGRESS NOTES
"  Cardiology Progress  Note      Patient Care Team:  Dilcia Spaulding APRN as PCP - General (Family Medicine)    PATIENT IDENTIFICATION  Name: Heladio Clark  Age: 54 y.o.  Sex: male  :  1970  MRN: [id number]      Length of stay:    LOS: 3 days           REASON FOR FOLLOW-UP:  Multivessel coronary artery disease  Status post three-vessel CABG      INTERVAL HISTORY  Patient seen and examined, chart and labs reviewed.  Status post activation, he is alert and oriented x 3.  Pain medicines on board for surgical postop pain.  Rhythm sinus, -1 20 systolic     SUBJECTIVE    No issues overnight  Shortness of breath stable  Surgical pain noted  Modify goal-directed medical therapy  Gentle diuresis  Pain management    Discharge planning per CV surgery    Lines and tubes management per CV surgery    REVIEW OF SYSTEMS:  Pertinent items are noted in HPI, all other systems reviewed and negative    OBJECTIVE   Hemoglobin 11.9    ASSESSMENT  Severe multivessel coronary artery disease status post CABG  Ascending aortic aneurysm-4.2 cm per CTA 2024  Mixed hyperlipidemia  Obstructive sleep apnea  Significant paternal family history of premature coronary disease  Hyperglycemia      RECOMMENDATIONS  Continue current CTS postop protocol  Continue CV supportive care  Monitor rhythm and hemodynamics closely  Monitor and replete electrolytes as needed  Activity as tolerated  Lines and tubes managed per CV surgery, central line out and Pelaez out  No anginal chest pain  Aspirin statin low-dose beta-blocker on board, electrolytes replaced    Will be seen by primary cardiologist tomorrow    Marquis Bonilla MD, PhD          CHF Guideline Directed Medical Therapy  Beta Blocker:   ARNI/ACE/ARB:   SGLT 2 inhibitors:   Diuretics:   Aldosterone Antagonist:   Vasodilators & Nitrates:       Vital Signs  Visit Vitals  /83 (Patient Position: Sitting)   Pulse 74   Temp 98 °F (36.7 °C) (Oral)   Resp 21   Ht 175.3 cm (69.02\")   Wt 106 " "kg (234 lb)   SpO2 92%   BMI 34.54 kg/m²     Oxygen Therapy  SpO2: 92 %  Pulse Oximetry Type: Continuous  Device (Oxygen Therapy): nasal cannula  Flow (L/min): 0.5  Oxygen Concentration (%): 4  Flowsheet Rows      Flowsheet Row First Filed Value   Admission Height 175.3 cm (69.02\") Documented at 08/08/2024 1900   Admission Weight 105 kg (231 lb 6.4 oz) Documented at 08/08/2024 0500          Intake & Output (last 3 days)         08/06 0701  08/07 0700 08/07 0701 08/08 0700 08/08 0701 08/09 0700 08/09 0701  08/10 0700    P.O.   150     I.V. (mL/kg)   2843 (26.8)     Blood   135     IV Piggyback   250     Total Intake(mL/kg)   3378 (31.9)     Urine (mL/kg/hr)   2690 (1.1)     Emesis/NG output   0     Stool   0     Blood   150     Chest Tube   740 70    Wound   0     Total Output   3580 70    Net   -202 -70            Stool Unmeasured Occurrence   0 x     Emesis Unmeasured Occurrence   0 x           Lines, Drains & Airways       Active LDAs       Name Placement date Placement time Site Days    CVC Double Lumen 08/08/24 Right Internal jugular 08/08/24  0705  created via procedure documentation  Internal jugular  1    Peripheral IV 08/08/24 Distal;Posterior;Right Forearm 08/08/24  --  Forearm  1    Chest Tube Mediastinal 08/08/24  --  Mediastinal  1    Urethral Catheter Temperature probe 16 Fr. 08/08/24  --  -- 1    Y Chest Tube 1 and 2 Right Pleural 28 Fr. Left Pleural 28 Fr. 08/08/24  --  -- 1    Pacer Wires --  --  Atrial and Ventricular  --                           /83 (Patient Position: Sitting)   Pulse 74   Temp 98 °F (36.7 °C) (Oral)   Resp 21   Ht 175.3 cm (69.02\")   Wt 106 kg (234 lb)   SpO2 92%   BMI 34.54 kg/m²   Intake/Output last 3 shifts:  I/O last 3 completed shifts:  In: 1681 [P.O.:1200; I.V.:481]  Out: 3300 [Urine:3300]  Intake/Output this shift:  No intake/output data recorded.    PHYSICAL EXAM:    General: Alert, cooperative, no distress, appears stated age  Head:  Normocephalic, " atraumatic, mucous membranes moist  Eyes:  Conjunctivae/corneas clear, EOM's intact     Neck:  IJ right  Lungs: Diminished but clear to auscultation bilaterally, no wheezes, rhonchi or rales are noted  Chest wall: Sternal wound dry and intact  Heart::  Regular rate and rhythm, S1 and S2 normal, no murmur, rub or gallop  Abdomen: Soft, nontender, nondistended, bowel sounds active  Extremities: No cyanosis, clubbing, or edema   Pulses: 2+ and symmetric all extremities  Skin:  No rashes or lesions  Neuro/psych: A&O x3. CN II through XII are grossly intact with appropriate affect    Change from prior encounter    Scheduled Meds:      aspirin, 81 mg, Oral, Daily  atorvastatin, 40 mg, Oral, Nightly  chlorhexidine, 15 mL, Mouth/Throat, Q12H  empagliflozin, 10 mg, Oral, Daily  enoxaparin, 40 mg, Subcutaneous, Daily  furosemide, 40 mg, Intravenous, Daily  insulin lispro, 2-7 Units, Subcutaneous, 4x Daily AC & at Bedtime  metoprolol tartrate, 12.5 mg, Oral, Q12H  mupirocin, , Each Nare, BID  pantoprazole, 40 mg, Oral, Q AM  polyethylene glycol, 17 g, Oral, BID  senna-docusate sodium, 2 tablet, Oral, BID        Continuous Infusions:    sodium chloride, 30 mL/hr, Last Rate: 30 mL/hr (24 1508)        PRN Meds:      acetaminophen **OR** acetaminophen **OR** acetaminophen    Calcium Replacement - Follow Nurse / BPA Driven Protocol    cyclobenzaprine    dextrose    dextrose    glucagon (human recombinant)    hydrALAZINE    HYDROcodone-acetaminophen    Magnesium Cardiology Dose Replacement - Follow Nurse / BPA Driven Protocol    meperidine    [] Morphine **AND** naloxone    nitroglycerin    ondansetron    oxyCODONE    Phosphorus Replacement - Follow Nurse / BPA Driven Protocol    Potassium Replacement - Follow Nurse / BPA Driven Protocol        Results Review:     I reviewed the patient's new clinical results.    CBC    Results from last 7 days   Lab Units 24  0459 08/10/24  0228 24  0253 24  1937  08/08/24  1816 08/08/24  1646 08/08/24  1540 08/08/24  1219 08/08/24  1145 08/08/24  0749 08/05/24  0822   WBC 10*3/mm3 11.50* 17.66* 12.39*  --   --   --   --   --  14.12*  --  10.11   HEMOGLOBIN g/dL 12.5* 12.6* 11.9*  --   --   --   --   --  12.6*  --  13.4   HEMOGLOBIN, POC g/dL  --   --   --  14.4 14.1 13.7 14.1   < >  --    < >  --    PLATELETS 10*3/mm3 230 239 236  --   --   --   --   --  214  --  268    < > = values in this interval not displayed.     Cr Clearance Estimated Creatinine Clearance: 120.5 mL/min (by C-G formula based on SCr of 0.85 mg/dL).  Coag   Results from last 7 days   Lab Units 08/09/24  0253 08/08/24  1145 08/05/24  0822   INR  1.09 1.10 0.98   APTT seconds  --  31.9* 32.4*     HbA1C   Lab Results   Component Value Date    HGBA1C 6.23 (H) 08/05/2024     Blood Glucose   Glucose   Date/Time Value Ref Range Status   08/11/2024 1212 122 (H) 70 - 105 mg/dL Final     Comment:     Serial Number: [id number]:  [id number]   08/11/2024 0807 144 (H) 70 - 105 mg/dL Final     Comment:     Serial Number: [id number]:  [id number]   08/10/2024 1705 166 (H) 70 - 105 mg/dL Final     Comment:     Serial Number: [id number]:  [id number]   08/10/2024 1611 159 (H) 70 - 105 mg/dL Final     Comment:     Serial Number: [id number]:  [id number]   08/10/2024 1517 118 (H) 70 - 105 mg/dL Final     Comment:     Serial Number: [id number]:  [id number]   08/10/2024 1411 131 (H) 70 - 105 mg/dL Final     Comment:     Serial Number: [id number]:  [id number]   08/10/2024 1213 162 (H) 70 - 105 mg/dL Final     Comment:     Serial Number: [id number]:  [id number]   08/10/2024 1110 169 (H) 70 - 105 mg/dL Final     Comment:     Serial Number: [id number]:  [id number]     Infection     CMP   Results from last 7 days   Lab Units 08/11/24  0459 08/10/24  0228 08/09/24  0253 08/08/24  1335 08/08/24  1219 08/08/24  1145 08/05/24  0902 08/05/24  0822   SODIUM mmol/L 136 135* 137  --   --  138   "--  140   POTASSIUM mmol/L 3.8 4.3 4.2  --   --  4.1  --  3.7   CHLORIDE mmol/L 98 99 105  --   --  108*  --  106   CO2 mmol/L 27.5 27.8 23.4  --   --  24.2  --  24.0   BUN mg/dL 13 13 12  --   --  9  --  10   CREATININE mg/dL 0.85 0.99 0.93 0.73 0.77 0.79 1.00 0.90   GLUCOSE mg/dL 121* 139* 150*  --   --  129*  --  124*   ALBUMIN g/dL  --   --  3.7  --   --  3.4*  --  3.9   BILIRUBIN mg/dL  --   --   --   --   --   --   --  0.4   ALK PHOS U/L  --   --   --   --   --   --   --  92   AST (SGOT) U/L  --   --   --   --   --   --   --  16   ALT (SGPT) U/L  --   --   --   --   --   --   --  22     ABG    Results from last 7 days   Lab Units 08/08/24  1937 08/08/24  1816 08/08/24  1646 08/08/24  1540 08/08/24  1459 08/08/24  1335 08/08/24  1219   PH, ARTERIAL pH units 7.340* 7.352 7.356 7.282* 7.345* 7.363 7.326*   PCO2, ARTERIAL mm Hg 44.3 43.6 42.4 52.8* 44.1 37.8 43.2   PO2 ART mm Hg 110.7* 129.1* 107.5 104.4 94.5 94.1 86.9   O2 SATURATION ART % 98.0 98.8* 97.9 97.1 96.9 97.1 95.8   BASE EXCESS ART mmol/L -2.0* -1.6* -1.9* -2.5* -1.8* -3.5* -3.4*     UA    Results from last 7 days   Lab Units 08/05/24  0822   NITRITE UA  Negative   WBC UA /HPF 3-5*   BACTERIA UA /HPF None Seen   SQUAM EPITHEL UA /HPF 0-2     VERN  No results found for: \"POCMETH\", \"POCAMPHET\", \"POCBARBITUR\", \"POCBENZO\", \"POCCOCAINE\", \"POCOPIATES\", \"POCOXYCODO\", \"POCPHENCYC\", \"POCPROPOXY\", \"POCTHC\", \"POCTRICYC\"  Lysis Labs   Results from last 7 days   Lab Units 08/11/24  0459 08/10/24  0228 08/09/24  0253 08/08/24  1937 08/08/24  1816 08/08/24  1646 08/08/24  1540 08/08/24  1459 08/08/24  1335 08/08/24  1219 08/08/24  1145 08/08/24  0749 08/05/24  0902 08/05/24  0822   INR   --   --  1.09  --   --   --   --   --   --   --  1.10  --   --  0.98   APTT seconds  --   --   --   --   --   --   --   --   --   --  31.9*  --   --  32.4*   HEMOGLOBIN g/dL 12.5* 12.6* 11.9*  --   --   --   --   --   --   --  12.6*  --   --  13.4   HEMOGLOBIN, POC g/dL  --   --   --  " 14.4 14.1 13.7 14.1   < > 13.9 12.6  --    < >  --   --    PLATELETS 10*3/mm3 230 239 236  --   --   --   --   --   --   --  214  --   --  268   CREATININE mg/dL 0.85 0.99 0.93  --   --   --   --   --  0.73 0.77 0.79  --  1.00 0.90    < > = values in this interval not displayed.     Radiology(recent) XR Chest 1 View    Result Date: 8/10/2024  Impression: 1.Low lung volumes with possible small pleural effusions and bibasilar opacities, as before. 2.Stable cardiomegaly. Electronically Signed: Marquis Epperson  8/10/2024 11:58 AM EDT  Workstation ID: [id number]    XR Chest 1 View    Result Date: 8/9/2024  Impression: 1. Interval removal of [place]-Nia catheter. 2. History states removal of chest tube. No pneumothorax identified. 3. Cardiomegaly with central pulmonary vascular congestion. 4. Low lung volumes with probable bibasilar atelectasis, with some improvement in the right midlung. Electronically Signed: Humza Harden MD  8/9/2024 2:20 PM EDT  Workstation ID: [id number]             X-rays, labs reviewed personally by physician.    ECG/EMG Results (most recent)       Procedure Component Value Units Date/Time    Telemetry Scan [[id number]] Resulted: 08/08/24     Updated: 08/08/24 1702    Telemetry Scan [[id number]] Resulted: 08/08/24     Updated: 08/08/24 2110    Telemetry Scan [[id number]] Resulted: 08/08/24     Updated: 08/08/24 2110    Telemetry Scan [[id number]] Resulted: 08/08/24     Updated: 08/08/24 2137    Telemetry Scan [[id number]] Resulted: 08/08/24     Updated: 08/09/24 0246    Telemetry Scan [[id number]] Resulted: 08/08/24     Updated: 08/09/24 0556    Telemetry Scan [[id number]] Resulted: 08/08/24     Updated: 08/09/24 0824    Telemetry Scan [[id number]] Resulted: 08/08/24     Updated: 08/09/24 1345    Telemetry Scan [[id number]] Resulted: 08/08/24     Updated: 08/09/24 1723    Telemetry Scan [[id number]] Resulted: 08/08/24     Updated: 08/09/24 2102    ECG 12 Lead Other; s/p CABG [[id number]] Collected: 08/09/24 0510      Updated: 08/09/24 2124     QT Interval 360 ms      QTC Interval 413 ms     Narrative:      HEART RATE=79  bpm  RR Qgikvjud=590  ms  OK Empukewc=371  ms  P Horizontal Axis=-25  deg  P Front Axis=58  deg  QRSD Interval=90  ms  QT Ayvukqfb=196  ms  JBgX=603  ms  QRS Axis=36  deg  T Wave Axis=18  deg  - ABNORMAL ECG -  Sinus rhythm  Probable  left atrial enlargement  When compared with ECG of 31-Jul-2024 13:16:55,  Significant rate increase  New or worsened ischemia or infarction  Electronically Signed By: Migel Keane ([facility]) 2024-08-09 21:18:29  Date and Time of Study:2024-08-09 05:10:47    Telemetry Scan [[id number]] Resulted: 08/08/24     Updated: 08/10/24 0700    Telemetry Scan [[id number]] Resulted: 08/08/24     Updated: 08/10/24 0751    Telemetry Scan [[id number]] Resulted: 08/08/24     Updated: 08/10/24 1141    Telemetry Scan [[id number]] Resulted: 08/08/24     Updated: 08/10/24 1239    Telemetry Scan [[id number]] Resulted: 08/08/24     Updated: 08/10/24 1728    ECG 12 Lead Other; s/p CABG [[id number]] Collected: 08/10/24 0451     Updated: 08/10/24 1818     QT Interval 371 ms      QTC Interval 411 ms     Narrative:      HEART RATE=73  bpm  RR Xzsbvgst=041  ms  OK Ikuqpviw=439  ms  P Horizontal Axis=-14  deg  P Front Axis=55  deg  QRSD Interval=88  ms  QT Bbfjkipt=251  ms  KLyT=438  ms  QRS Axis=30  deg  T Wave Axis=4  deg  - ABNORMAL ECG -  Sinus rhythm  Anterolateral  infarct, acute (LAD)  When compared with ECG of 09-Aug-2024 05:10:47,  New or worsened ischemia or infarction  Significant axis, voltage or hypertrophy change  Electronically Signed By: Migel Keane ([facility]) 2024-08-10 18:07:17  Date and Time of Study:2024-08-10 04:51:14    ECG 12 Lead Other; s/p CABG [[id number]] Collected: 08/08/24 1809     Updated: 08/10/24 1818     QT Interval 373 ms      QTC Interval 411 ms     Narrative:      HEART RATE=73  bpm  RR Wwyykzwc=846  ms  OK Oyvxlrdt=183  ms  P Horizontal Axis=48  deg  P Front Axis=56  deg  QRSD Interval=98   ms  QT Grkuyrcg=333  ms  AKcE=203  ms  QRS Axis=36  deg  T Wave Axis=10  deg  - ABNORMAL ECG -  Sinus rhythm  ST elevation, consider lateral injury  When compared with ECG of 2024 13:16:55,  No significant change  Electronically Signed By: Migel Keane ([facility]) 2024-08-10 18:07:23  Date and Time of Study:2024 18:09:48    Telemetry Scan [[id number]] Resulted: 24     Updated: 08/10/24 2005    Telemetry Scan [[id number]] Resulted: 24     Updated: 24 0848    Telemetry Scan [[id number]] Resulted: 24     Updated: 24 0854    Telemetry Scan [[id number]] Resulted: 24     Updated: 24 0916              Medication Review:   I have reviewed the patient's current medication list  Scheduled Meds:aspirin, 81 mg, Oral, Daily  atorvastatin, 40 mg, Oral, Nightly  chlorhexidine, 15 mL, Mouth/Throat, Q12H  empagliflozin, 10 mg, Oral, Daily  enoxaparin, 40 mg, Subcutaneous, Daily  furosemide, 40 mg, Intravenous, Daily  insulin lispro, 2-7 Units, Subcutaneous, 4x Daily AC & at Bedtime  metoprolol tartrate, 12.5 mg, Oral, Q12H  mupirocin, , Each Nare, BID  pantoprazole, 40 mg, Oral, Q AM  polyethylene glycol, 17 g, Oral, BID  senna-docusate sodium, 2 tablet, Oral, BID      Continuous Infusions:sodium chloride, 30 mL/hr, Last Rate: 30 mL/hr (24 1508)      PRN Meds:.  acetaminophen **OR** acetaminophen **OR** acetaminophen    Calcium Replacement - Follow Nurse / BPA Driven Protocol    cyclobenzaprine    dextrose    dextrose    glucagon (human recombinant)    hydrALAZINE    HYDROcodone-acetaminophen    Magnesium Cardiology Dose Replacement - Follow Nurse / BPA Driven Protocol    meperidine    [] Morphine **AND** naloxone    nitroglycerin    ondansetron    oxyCODONE    Phosphorus Replacement - Follow Nurse / BPA Driven Protocol    Potassium Replacement - Follow Nurse / BPA Driven Protocol    Imaging:  Imaging Results (Last 72 Hours)       Procedure Component Value Units Date/Time     XR Chest 1 View [[id number]] Collected: 08/10/24 1156     Updated: 08/10/24 1200    Narrative:      XR CHEST 1 VW    Date of Exam: 8/10/2024 6:00 AM EDT    Indication: Post-Op Heart Surgery    Comparison:  8/9/2024    Findings:  Right IJ sheath remains. Status post median sternotomy. The heart appears enlarged. Low lung volumes. Possible small pleural effusions. Bibasilar opacities appear grossly unchanged. No definite pneumothorax.      Impression:      Impression:  1.Low lung volumes with possible small pleural effusions and bibasilar opacities, as before.  2.Stable cardiomegaly.        Electronically Signed: Marquis Epperson    8/10/2024 11:58 AM EDT    Workstation ID: [id number]    XR Chest 1 View [[id number]] Collected: 08/09/24 1417     Updated: 08/09/24 1422    Narrative:      XR CHEST 1 VW    Date of Exam: 8/9/2024 2:00 PM EDT    Indication: Chest tube removal    Comparison: 8/9/2024    Findings:  Sternotomy wires overlie the midline. Right IJ introducer sheath again noted. [place]-Nia catheter has been removed. No pneumothorax identified. There are low lung volumes. Cardiomegaly is noted. There is central pulmonary vascular congestion and bibasilar   atelectasis, similar to prior. Some interval improvement of right midlung atelectasis.      Impression:      Impression:    1. Interval removal of [place]-Nia catheter.  2. History states removal of chest tube. No pneumothorax identified.  3. Cardiomegaly with central pulmonary vascular congestion.  4. Low lung volumes with probable bibasilar atelectasis, with some improvement in the right midlung.       Electronically Signed: Humza Harden MD    8/9/2024 2:20 PM EDT    Workstation ID: [id number]    XR Chest 1 View [[id number]] Collected: 08/09/24 0706     Updated: 08/09/24 0709    Narrative:      XR CHEST 1 VW    Date of Exam: 8/9/2024 6:11 AM EDT    Indication: Post-Op Heart Surgery    Comparison: 8/8/2024    Findings:  Status post sternotomy. The patient has been extubated.  The esophagogastric tube is been removed. There is a stable right IJ [place]-Nia catheter with tip overlying the main pulmonary arterial outflow. Mild right perihilar and linear bibasilar atelectasis   unchanged. Negative for pneumothorax. No significant effusion.      Impression:      Impression:  1. Interval extubation and removal of esophagogastric tube.  2. Stable right IJ Waterproof-Nia catheter.  3. No pneumothorax.  4. Mild right perihilar and linear bibasilar atelectasis unchanged.        Electronically Signed: Albert Martin MD    8/9/2024 7:07 AM EDT    Workstation ID: [id number]              Marquis Bonilla MD  08/11/24  12:37 EDT

## 2024-08-11 NOTE — PROGRESS NOTES
" LOS: 3 days   Patient Care Team:  Dilcia Spaulding APRN as PCP - General (Family Medicine)    Chief Complaint:       Subjective      Vital Signs  Temp:  [98 °F (36.7 °C)-98.7 °F (37.1 °C)] 98 °F (36.7 °C)  Heart Rate:  [71-82] 74  Resp:  [16-22] 21  BP: (112-137)/(63-84) 137/83  Body mass index is 34.54 kg/m².    Intake/Output Summary (Last 24 hours) at 8/11/2024 0951  Last data filed at 8/11/2024 0527  Gross per 24 hour   Intake 480 ml   Output 1875 ml   Net -1395 ml     No intake/output data recorded.      Wt Readings from Last 3 Encounters:   08/11/24 106 kg (234 lb)   08/05/24 104 kg (228 lb 9.6 oz)   08/02/24 102 kg (225 lb 15.5 oz)         Objective:  Vital signs: (most recent): Blood pressure 137/83, pulse 74, temperature 98 °F (36.7 °C), temperature source Oral, resp. rate 21, height 175.3 cm (69.02\"), weight 106 kg (234 lb), SpO2 92%.                Results Review:        WBC No results found for: \"WBCS\"   HGB Hemoglobin   Date Value Ref Range Status   08/11/2024 12.5 (L) 13.0 - 17.7 g/dL Final   08/10/2024 12.6 (L) 13.0 - 17.7 g/dL Final   08/09/2024 11.9 (L) 13.0 - 17.7 g/dL Final   08/08/2024 14.4 12.0 - 17.0 g/dL Final   08/08/2024 14.1 12.0 - 17.0 g/dL Final   08/08/2024 13.7 12.0 - 17.0 g/dL Final   08/08/2024 14.1 12.0 - 17.0 g/dL Final   08/08/2024 13.9 12.0 - 17.0 g/dL Final   08/08/2024 13.9 12.0 - 17.0 g/dL Final   08/08/2024 12.6 12.0 - 17.0 g/dL Final   08/08/2024 12.6 (L) 13.0 - 17.7 g/dL Final   08/08/2024 12.2 12.0 - 17.0 g/dL Final   08/08/2024 12.2 12.0 - 17.0 g/dL Final      HCT Hematocrit   Date Value Ref Range Status   08/11/2024 39.2 37.5 - 51.0 % Final   08/10/2024 39.6 37.5 - 51.0 % Final   08/09/2024 37.5 37.5 - 51.0 % Final   08/08/2024 42 38 - 51 % Final   08/08/2024 41 38 - 51 % Final   08/08/2024 40 38 - 51 % Final   08/08/2024 41 38 - 51 % Final   08/08/2024 41 38 - 51 % Final   08/08/2024 41 38 - 51 % Final   08/08/2024 37 (L) 38 - 51 % Final   08/08/2024 39.3 37.5 - 51.0 % " "Final   08/08/2024 36 (L) 38 - 51 % Final   08/08/2024 36 (L) 38 - 51 % Final      Platelets No results found for: \"LABPLAT\"     PT/INR:    Protime   Date Value Ref Range Status   08/09/2024 11.8 (H) 9.6 - 11.7 Seconds Final   08/08/2024 11.9 (H) 9.6 - 11.7 Seconds Final   /  INR   Date Value Ref Range Status   08/09/2024 1.09 0.93 - 1.10 Final   08/08/2024 1.10 0.93 - 1.10 Final       Sodium Sodium   Date Value Ref Range Status   08/11/2024 136 136 - 145 mmol/L Final   08/10/2024 135 (L) 136 - 145 mmol/L Final   08/09/2024 137 136 - 145 mmol/L Final   08/08/2024 138 136 - 145 mmol/L Final      Potassium Potassium   Date Value Ref Range Status   08/11/2024 3.8 3.5 - 5.2 mmol/L Final   08/10/2024 4.3 3.5 - 5.2 mmol/L Final   08/09/2024 4.2 3.5 - 5.2 mmol/L Final   08/08/2024 4.1 3.5 - 5.2 mmol/L Final      Chloride Chloride   Date Value Ref Range Status   08/11/2024 98 98 - 107 mmol/L Final   08/10/2024 99 98 - 107 mmol/L Final   08/09/2024 105 98 - 107 mmol/L Final   08/08/2024 108 (H) 98 - 107 mmol/L Final      Bicarbonate No results found for: \"PLASMABICARB\"   BUN BUN   Date Value Ref Range Status   08/11/2024 13 6 - 20 mg/dL Final   08/10/2024 13 6 - 20 mg/dL Final   08/09/2024 12 6 - 20 mg/dL Final   08/08/2024 9 6 - 20 mg/dL Final      Creatinine Creatinine   Date Value Ref Range Status   08/11/2024 0.85 0.76 - 1.27 mg/dL Final   08/10/2024 0.99 0.76 - 1.27 mg/dL Final   08/09/2024 0.93 0.76 - 1.27 mg/dL Final   08/08/2024 0.73 0.60 - 1.30 mg/dL Final     Comment:     Serial Number: [id number]:  [id number]   08/08/2024 0.77 0.60 - 1.30 mg/dL Final     Comment:     Serial Number: [id number]:  [id number]   08/08/2024 0.79 0.76 - 1.27 mg/dL Final      Calcium Calcium   Date Value Ref Range Status   08/11/2024 8.8 8.6 - 10.5 mg/dL Final   08/10/2024 8.9 8.6 - 10.5 mg/dL Final   08/09/2024 8.2 (L) 8.6 - 10.5 mg/dL Final   08/08/2024 7.6 (L) 8.6 - 10.5 mg/dL Final      Magnesium Magnesium   Date Value Ref Range Status "   08/09/2024 2.1 1.6 - 2.6 mg/dL Final   08/09/2024 1.7 1.6 - 2.6 mg/dL Final         .imag    aspirin, 81 mg, Oral, Daily  atorvastatin, 40 mg, Oral, Nightly  chlorhexidine, 15 mL, Mouth/Throat, Q12H  enoxaparin, 40 mg, Subcutaneous, Daily  furosemide, 40 mg, Intravenous, Daily  metoprolol tartrate, 12.5 mg, Oral, Q12H  mupirocin, , Each Nare, BID  pantoprazole, 40 mg, Oral, Q AM  polyethylene glycol, 17 g, Oral, BID  senna-docusate sodium, 2 tablet, Oral, BID      insulin, 0-100 Units/hr, Last Rate: Stopped (08/08/24 1145)  sodium chloride, 30 mL/hr, Last Rate: 30 mL/hr (08/08/24 1508)            Coronary heart disease    CAD (coronary artery disease)    Ascending aortic aneurysm      Assessment & Plan    POD# 3 CABG. Doing well. On asa/statin/ bb. Diuresed well.  Add oxycodone, pt max-ed on Tylenol and Flexeril.  Chest pain is okay.  DC wires. Do nocturnal and walking oximetry. Maybe home tomorrow.       Salvatore Domingo MD  08/11/24  09:51 EDT

## 2024-08-12 PROBLEM — Z95.1 S/P CABG X 3: Status: ACTIVE | Noted: 2024-08-12

## 2024-08-12 PROBLEM — I48.91 POSTOPERATIVE ATRIAL FIBRILLATION: Status: ACTIVE | Noted: 2024-08-12

## 2024-08-12 PROBLEM — I97.89 POSTOPERATIVE ATRIAL FIBRILLATION: Status: ACTIVE | Noted: 2024-08-12

## 2024-08-12 LAB
ANION GAP SERPL CALCULATED.3IONS-SCNC: 10.8 MMOL/L (ref 5–15)
BUN SERPL-MCNC: 18 MG/DL (ref 6–20)
BUN/CREAT SERPL: 18.8 (ref 7–25)
CALCIUM SPEC-SCNC: 8.8 MG/DL (ref 8.6–10.5)
CHLORIDE SERPL-SCNC: 98 MMOL/L (ref 98–107)
CO2 SERPL-SCNC: 26.2 MMOL/L (ref 22–29)
CREAT SERPL-MCNC: 0.96 MG/DL (ref 0.76–1.27)
DEPRECATED RDW RBC AUTO: 42 FL (ref 37–54)
EGFRCR SERPLBLD CKD-EPI 2021: 94.5 ML/MIN/1.73
ERYTHROCYTE [DISTWIDTH] IN BLOOD BY AUTOMATED COUNT: 13.2 % (ref 12.3–15.4)
GLUCOSE BLDC GLUCOMTR-MCNC: 100 MG/DL (ref 70–105)
GLUCOSE BLDC GLUCOMTR-MCNC: 101 MG/DL (ref 70–105)
GLUCOSE BLDC GLUCOMTR-MCNC: 102 MG/DL (ref 70–105)
GLUCOSE BLDC GLUCOMTR-MCNC: 115 MG/DL (ref 70–105)
GLUCOSE BLDC GLUCOMTR-MCNC: 117 MG/DL (ref 70–105)
GLUCOSE SERPL-MCNC: 94 MG/DL (ref 65–99)
HCT VFR BLD AUTO: 38 % (ref 37.5–51)
HGB BLD-MCNC: 12.4 G/DL (ref 13–17.7)
MAGNESIUM SERPL-MCNC: 2 MG/DL (ref 1.6–2.6)
MCH RBC QN AUTO: 28.4 PG (ref 26.6–33)
MCHC RBC AUTO-ENTMCNC: 32.6 G/DL (ref 31.5–35.7)
MCV RBC AUTO: 87 FL (ref 79–97)
PLATELET # BLD AUTO: 302 10*3/MM3 (ref 140–450)
PMV BLD AUTO: 8.9 FL (ref 6–12)
POTASSIUM SERPL-SCNC: 3.6 MMOL/L (ref 3.5–5.2)
POTASSIUM SERPL-SCNC: 3.9 MMOL/L (ref 3.5–5.2)
QT INTERVAL: 291 MS
QTC INTERVAL: 458 MS
RBC # BLD AUTO: 4.37 10*6/MM3 (ref 4.14–5.8)
SODIUM SERPL-SCNC: 135 MMOL/L (ref 136–145)
WBC NRBC COR # BLD AUTO: 10.47 10*3/MM3 (ref 3.4–10.8)

## 2024-08-12 PROCEDURE — 99233 SBSQ HOSP IP/OBS HIGH 50: CPT | Performed by: INTERNAL MEDICINE

## 2024-08-12 PROCEDURE — 94762 N-INVAS EAR/PLS OXIMTRY CONT: CPT

## 2024-08-12 PROCEDURE — 83735 ASSAY OF MAGNESIUM: CPT | Performed by: NURSE PRACTITIONER

## 2024-08-12 PROCEDURE — 80048 BASIC METABOLIC PNL TOTAL CA: CPT | Performed by: NURSE PRACTITIONER

## 2024-08-12 PROCEDURE — 25010000002 AMIODARONE IN DEXTROSE 5% 360-4.14 MG/200ML-% SOLUTION: Performed by: INTERNAL MEDICINE

## 2024-08-12 PROCEDURE — 97116 GAIT TRAINING THERAPY: CPT

## 2024-08-12 PROCEDURE — 25010000002 FUROSEMIDE PER 20 MG

## 2024-08-12 PROCEDURE — 82948 REAGENT STRIP/BLOOD GLUCOSE: CPT

## 2024-08-12 PROCEDURE — 97530 THERAPEUTIC ACTIVITIES: CPT

## 2024-08-12 PROCEDURE — 25010000002 AMIODARONE IN DEXTROSE 5% 150-4.21 MG/100ML-% SOLUTION: Performed by: INTERNAL MEDICINE

## 2024-08-12 PROCEDURE — 25010000002 ENOXAPARIN PER 10 MG: Performed by: NURSE PRACTITIONER

## 2024-08-12 PROCEDURE — 93005 ELECTROCARDIOGRAM TRACING: CPT

## 2024-08-12 PROCEDURE — 84132 ASSAY OF SERUM POTASSIUM: CPT

## 2024-08-12 PROCEDURE — 85027 COMPLETE CBC AUTOMATED: CPT | Performed by: NURSE PRACTITIONER

## 2024-08-12 RX ORDER — POTASSIUM CHLORIDE 20 MEQ/1
40 TABLET, EXTENDED RELEASE ORAL EVERY 4 HOURS
Status: COMPLETED | OUTPATIENT
Start: 2024-08-12 | End: 2024-08-12

## 2024-08-12 RX ORDER — POTASSIUM CHLORIDE 20 MEQ/1
20 TABLET, EXTENDED RELEASE ORAL ONCE
Status: COMPLETED | OUTPATIENT
Start: 2024-08-12 | End: 2024-08-12

## 2024-08-12 RX ADMIN — OXYCODONE 5 MG: 5 TABLET ORAL at 05:03

## 2024-08-12 RX ADMIN — MUPIROCIN 1 APPLICATION: 20 OINTMENT TOPICAL at 20:44

## 2024-08-12 RX ADMIN — PANTOPRAZOLE SODIUM 40 MG: 40 TABLET, DELAYED RELEASE ORAL at 05:03

## 2024-08-12 RX ADMIN — AMIODARONE HYDROCHLORIDE 0.5 MG/MIN: 1.8 INJECTION, SOLUTION INTRAVENOUS at 11:27

## 2024-08-12 RX ADMIN — ATORVASTATIN CALCIUM 40 MG: 40 TABLET, FILM COATED ORAL at 20:44

## 2024-08-12 RX ADMIN — POTASSIUM CHLORIDE 20 MEQ: 1500 TABLET, EXTENDED RELEASE ORAL at 20:44

## 2024-08-12 RX ADMIN — AMIODARONE HYDROCHLORIDE 1 MG/MIN: 1.8 INJECTION, SOLUTION INTRAVENOUS at 05:58

## 2024-08-12 RX ADMIN — Medication 12.5 MG: at 11:37

## 2024-08-12 RX ADMIN — ASPIRIN 81 MG: 81 TABLET, COATED ORAL at 09:29

## 2024-08-12 RX ADMIN — FUROSEMIDE 40 MG: 10 INJECTION, SOLUTION INTRAMUSCULAR; INTRAVENOUS at 09:29

## 2024-08-12 RX ADMIN — POTASSIUM CHLORIDE 40 MEQ: 1500 TABLET, EXTENDED RELEASE ORAL at 01:49

## 2024-08-12 RX ADMIN — CHLORHEXIDINE GLUCONATE, 0.12% ORAL RINSE 15 ML: 1.2 SOLUTION DENTAL at 20:44

## 2024-08-12 RX ADMIN — POTASSIUM CHLORIDE 40 MEQ: 1500 TABLET, EXTENDED RELEASE ORAL at 05:03

## 2024-08-12 RX ADMIN — AMIODARONE HYDROCHLORIDE 0.5 MG/MIN: 1.8 INJECTION, SOLUTION INTRAVENOUS at 23:54

## 2024-08-12 RX ADMIN — Medication 12.5 MG: at 09:29

## 2024-08-12 RX ADMIN — SENNOSIDES AND DOCUSATE SODIUM 2 TABLET: 50; 8.6 TABLET ORAL at 20:40

## 2024-08-12 RX ADMIN — AMIODARONE HYDROCHLORIDE 150 MG: 1.5 INJECTION, SOLUTION INTRAVENOUS at 05:50

## 2024-08-12 RX ADMIN — HYDROCODONE BITARTRATE AND ACETAMINOPHEN 1 TABLET: 5; 325 TABLET ORAL at 22:13

## 2024-08-12 RX ADMIN — MUPIROCIN 1 APPLICATION: 20 OINTMENT TOPICAL at 09:29

## 2024-08-12 RX ADMIN — CYCLOBENZAPRINE 10 MG: 10 TABLET, FILM COATED ORAL at 05:03

## 2024-08-12 RX ADMIN — ENOXAPARIN SODIUM 40 MG: 100 INJECTION SUBCUTANEOUS at 17:45

## 2024-08-12 RX ADMIN — CHLORHEXIDINE GLUCONATE, 0.12% ORAL RINSE 15 ML: 1.2 SOLUTION DENTAL at 09:29

## 2024-08-12 RX ADMIN — METOPROLOL TARTRATE 25 MG: 25 TABLET, FILM COATED ORAL at 20:39

## 2024-08-12 RX ADMIN — EMPAGLIFLOZIN 10 MG: 10 TABLET, FILM COATED ORAL at 09:29

## 2024-08-12 NOTE — PROGRESS NOTES
S/P POD# 4 off pump CABG x3 with bilateral IMAs--Camporrotondo  EF 60% (cath)    Subjective:  no c/o's, reports pain improved with chest tube removal    Went into rapid atrial fib this morning ~ 5 am  Drips:  amio 1  Wt is down 3 kgs from preop      Intake/Output Summary (Last 24 hours) at 8/12/2024 1031  Last data filed at 8/12/2024 0600  Gross per 24 hour   Intake 720 ml   Output 625 ml   Net 95 ml     Temp:  [97.9 °F (36.6 °C)-98.4 °F (36.9 °C)] 98.1 °F (36.7 °C)  Heart Rate:  [] 71  Resp:  [14-21] 20  BP: (108-140)/(59-88) 120/76      Results from last 7 days   Lab Units 08/12/24  0008 08/11/24  0459 08/10/24  0228 08/09/24  0253 08/08/24  1219 08/08/24  1145   WBC 10*3/mm3 10.47 11.50*   < > 12.39*  --  14.12*   HEMOGLOBIN g/dL 12.4* 12.5*   < > 11.9*  --  12.6*   HEMOGLOBIN, POC   --   --   --   --    < >  --    HEMATOCRIT % 38.0 39.2   < > 37.5  --  39.3   HEMATOCRIT POC   --   --   --   --    < >  --    PLATELETS 10*3/mm3 302 230   < > 236  --  214   INR   --   --   --  1.09  --  1.10    < > = values in this interval not displayed.     Results from last 7 days   Lab Units 08/12/24  0008 08/09/24  1122 08/09/24  0253   CREATININE mg/dL 0.96   < > 0.93   POTASSIUM mmol/L 3.6   < > 4.2   SODIUM mmol/L 135*   < > 137   MAGNESIUM mg/dL 2.0   < > 1.7   PHOSPHORUS mg/dL  --   --  3.3    < > = values in this interval not displayed.     ica 1.09    Physical Exam:  Neuro intact, nad, up in recliner  Tele:  SR 70s  Diminished bases, 96% 1L  dsg c/d/i, no drainage--has [place] dressing in place  Benign abd, + BM  No edema    Assessment/Plan:  Principal Problem:    Coronary heart disease  Active Problems:    CAD (coronary artery disease)    Ascending aortic aneurysm    - MV CAD, EF 60% (cath)--s/p elective CABG x 3 with LIMA to LAD, KIMO to Diagonal, KIMO to OM (Camporrotondo)  - Ascending aortic dilatation, 4.2 cm with mild AI--per echo, CTA 8/5 asc 4.2 cm  - Mixed HLD--statin  - GUANAKITO--does not have CPAP yet  - Family  hx of premature CAD--father MI in his 40s/ CABG  - Hyperglycemia--preop A1c 6.23  - Postop leukocytosis, likely reactive--improving  - Postop ABLA, expected--watch closely  - Postop rapid atrial fib--converted with IV amio    POD# 4.  Doing well except for converting into rapid atrial fib this morning.  Converted with IV amio to sinus.  On asa/statin/increase bb.  Replete e-.  Nocturnal oximetery ordered.  He will require longitudinal surveillance of his thoracic aortic dilatation.  Hopefully home tomorrow.      JULIO CÉSAR?DS?-VASc Score for Atrial Fibrillation Stroke Risk 8/12/2024  1 points  Stroke risk was 0.6% per year in >90,000 patients (the American Atrial Fibrillation Cohort Study) and 0.9% risk of stroke/TIA/systemic embolism.      Routine care--as above  D/w pt/nsg, Dr. Domingo, cardiology  Anticipate home at discharge    Fadia Lance, [place]  8/12/2024  10:31 EDT

## 2024-08-12 NOTE — PLAN OF CARE
Goal Outcome Evaluation:   Heladio Clark presents with functional mobility impairments which indicate the need for skilled intervention. He is progressing well with his mobility and doing well maintaining sternal precautions. Nearing d/c with plans for home with A from family. Tolerating session today without incident. Will continue to follow and progress as tolerated.

## 2024-08-12 NOTE — PROGRESS NOTES
[facility] CARDIOLOGY  [facility]        LOS:  LOS: 4 days   Patient Name: Heladio Clark  Age/Sex: 54 y.o. male  : 1970  MRN: [id number]    Day of Service: 24   Length of Stay: 4  Encounter Provider: A[place]ANDRIA Tilley  Place of Service: [facility] CARDIOLOGY  Patient Care Team:  Dilcia Spaulding APRN as PCP - General (Family Medicine)      Cardiology assessment and plan      Coronary disease status post CABG x 3  Ascending aortic aneurysm measuring 4.2 cm  Postop atrial fibrillation currently in sinus rhythm currently on IV amiodarone  Low Arian vascular score  Normal LV systolic function  Hyperlipidemia  Obstructive sleep apnea  Postop day 4 status post CABG  CABG x 3 (LIMA to LAD, KIMO to Diagonal, KIMO to OM) off pump. Total arterial an aortic off pump CABG.     Denies any new complaints except for sternal wound.  Patient was in A-fib last night currently in sinus rhythm  Tmax is 98.2 pulse is 69 respirations are 20 blood pressure is 116/72 sats are 93%  Sodium is 135 potassium is 3.6 creatinine is 0.9 hemoglobin is 12.4  Twelve-lead EKG from early in the morning showing atrial fibrillation with rapid response  Current medications include IV amiodarone drip-patient is on aspirin 81 mg p.o. once a day patient is on Lipitor 40 mg p.o. once a day Lasix 40 mg once a day patient is on metoprolol 25 mg p.o. twice daily and patient is on Lovenox for DVT prophylaxis  Likely transition to oral amiodarone later in the day  Ambulate continue postsurgical care  Further recommendations based on patient course                    Subjective:     Chief Complaint:  F/U CAD    Subjective:   Patient felt palpitations overnight.  Denies SOA currently.  C/o inspiratory pain with breathing.  Has walked around the room.  Denies any hx bleeding, stroke, or blood clots.      Current Medications:   Scheduled Meds:aspirin, 81 mg, Oral,  Daily  atorvastatin, 40 mg, Oral, Nightly  chlorhexidine, 15 mL, Mouth/Throat, Q12H  empagliflozin, 10 mg, Oral, Daily  enoxaparin, 40 mg, Subcutaneous, Daily  insulin lispro, 2-7 Units, Subcutaneous, 4x Daily AC & at Bedtime  metoprolol tartrate, 12.5 mg, Oral, Once  metoprolol tartrate, 25 mg, Oral, Q12H  mupirocin, , Each Nare, BID  pantoprazole, 40 mg, Oral, Q AM  polyethylene glycol, 17 g, Oral, BID  senna-docusate sodium, 2 tablet, Oral, BID      Continuous Infusions:amiodarone, 1 mg/min, Last Rate: 1 mg/min (08/12/24 0738)   Followed by  amiodarone, 0.5 mg/min        Allergies:  Allergies   Allergen Reactions    Nuts Anaphylaxis    Penicillins Hives       Review of Systems   Constitutional: Negative for chills, decreased appetite and malaise/fatigue.   HENT:  Negative for congestion and nosebleeds.    Eyes:  Negative for blurred vision and double vision.   Cardiovascular:  Negative for chest pain, dyspnea on exertion, irregular heartbeat, leg swelling, near-syncope, orthopnea and palpitations.   Respiratory:  Negative for cough and shortness of breath.    Hematologic/Lymphatic: Negative for adenopathy. Does not bruise/bleed easily.   Skin:  Negative for color change and rash.   Musculoskeletal:  Negative for back pain and joint pain.   Gastrointestinal:  Negative for bloating, abdominal pain, heartburn and hematemesis.   Genitourinary:  Negative for flank pain and hematuria.   Neurological:  Negative for dizziness and focal weakness.   Psychiatric/Behavioral:  Negative for altered mental status. The patient does not have insomnia.        Objective:     Temp:  [97.9 °F (36.6 °C)-98.4 °F (36.9 °C)] 98.1 °F (36.7 °C)  Heart Rate:  [] 71  Resp:  [14-21] 20  BP: (108-140)/(59-88) 120/76     Intake/Output Summary (Last 24 hours) at 8/12/2024 1037  Last data filed at 8/12/2024 0600  Gross per 24 hour   Intake 720 ml   Output 625 ml   Net 95 ml     Body mass index is 33.11 kg/m².      08/10/24  0500  "08/11/24  0527 08/12/24  0500   Weight: 106 kg (233 lb) 106 kg (234 lb) 102 kg (224 lb 4.8 oz)         Physical Exam:  Neuro:  CV:  Resp:  GI:  Ext:  Tele: AAOx3, no gross deficits  S1S2 RRR, no murmur  shallow, CTA/dim bases  BS+, abd soft  Pedal pulses palp, no edema  SR                                                   Lab Review:   Results from last 7 days   Lab Units 08/12/24  0008 08/11/24  1352 08/11/24  0459   SODIUM mmol/L 135*  --  136   POTASSIUM mmol/L 3.6 3.8 3.8   CHLORIDE mmol/L 98  --  98   CO2 mmol/L 26.2  --  27.5   BUN mg/dL 18  --  13   CREATININE mg/dL 0.96  --  0.85   GLUCOSE mg/dL 94  --  121*   CALCIUM mg/dL 8.8  --  8.8         Results from last 7 days   Lab Units 08/12/24  0008 08/11/24  0459   WBC 10*3/mm3 10.47 11.50*   HEMOGLOBIN g/dL 12.4* 12.5*   HEMATOCRIT % 38.0 39.2   PLATELETS 10*3/mm3 302 230     Results from last 7 days   Lab Units 08/09/24  0253 08/08/24  1145   INR  1.09 1.10   APTT seconds  --  31.9*     Results from last 7 days   Lab Units 08/12/24  0008 08/09/24  1122   MAGNESIUM mg/dL 2.0 2.1           Invalid input(s): \"LDLCALC\"            Recent Radiology:  Imaging Results (Most Recent)       Procedure Component Value Units Date/Time    XR Chest 1 View [[id number]] Collected: 08/10/24 1156     Updated: 08/10/24 1200    Narrative:      XR CHEST 1 VW    Date of Exam: 8/10/2024 6:00 AM EDT    Indication: Post-Op Heart Surgery    Comparison:  8/9/2024    Findings:  Right IJ sheath remains. Status post median sternotomy. The heart appears enlarged. Low lung volumes. Possible small pleural effusions. Bibasilar opacities appear grossly unchanged. No definite pneumothorax.      Impression:      Impression:  1.Low lung volumes with possible small pleural effusions and bibasilar opacities, as before.  2.Stable cardiomegaly.        Electronically Signed: Marquis Epperson    8/10/2024 11:58 AM EDT    Workstation ID: [id number]    XR Chest 1 View [[id number]] Collected: 08/09/24 1417     " Updated: 08/09/24 1422    Narrative:      XR CHEST 1 VW    Date of Exam: 8/9/2024 2:00 PM EDT    Indication: Chest tube removal    Comparison: 8/9/2024    Findings:  Sternotomy wires overlie the midline. Right IJ introducer sheath again noted. [place]-Nia catheter has been removed. No pneumothorax identified. There are low lung volumes. Cardiomegaly is noted. There is central pulmonary vascular congestion and bibasilar   atelectasis, similar to prior. Some interval improvement of right midlung atelectasis.      Impression:      Impression:    1. Interval removal of [place]-Nia catheter.  2. History states removal of chest tube. No pneumothorax identified.  3. Cardiomegaly with central pulmonary vascular congestion.  4. Low lung volumes with probable bibasilar atelectasis, with some improvement in the right midlung.       Electronically Signed: Humza Harden MD    8/9/2024 2:20 PM EDT    Workstation ID: [id number]    XR Chest 1 View [[id number]] Collected: 08/09/24 0706     Updated: 08/09/24 0709    Narrative:      XR CHEST 1 VW    Date of Exam: 8/9/2024 6:11 AM EDT    Indication: Post-Op Heart Surgery    Comparison: 8/8/2024    Findings:  Status post sternotomy. The patient has been extubated. The esophagogastric tube is been removed. There is a stable right IJ [place]-Nia catheter with tip overlying the main pulmonary arterial outflow. Mild right perihilar and linear bibasilar atelectasis   unchanged. Negative for pneumothorax. No significant effusion.      Impression:      Impression:  1. Interval extubation and removal of esophagogastric tube.  2. Stable right IJ [place]-Nia catheter.  3. No pneumothorax.  4. Mild right perihilar and linear bibasilar atelectasis unchanged.        Electronically Signed: Albert Martin MD    8/9/2024 7:07 AM EDT    Workstation ID: [id number]    XR Abdomen KUB [[id number]] Collected: 08/08/24 1219     Updated: 08/08/24 1222    Narrative:      XR ABDOMEN KUB    Date of Exam: 8/8/2024 12:00 PM  EDT    Indication: OG placement verification    Comparison: None available.    Findings:  Single supine image. There is considerable artifact over the abdomen. There is an orogastric tube with its tip in the distal stomach. Visualized bowel is nondilated.      Impression:      Impression:  Orogastric tube tip in the distal stomach.      Electronically Signed: Suyapa Fitzgerald MD    8/8/2024 12:20 PM EDT    Workstation ID: [id number]    XR Chest 1 View [[id number]] Collected: 08/08/24 1219     Updated: 08/08/24 1222    Narrative:      XR CHEST 1 VW    Date of Exam: 8/8/2024 12:00 PM EDT    Indication: Post-Op Check Line & Tube Placement    Comparison: CT chest 8/5/2024, PA and lateral chest 8/5/2024    Findings:  Median sternotomy changes are present. Heart size is normal. Linear subsegmental atelectasis is seen in a bibasilar distribution. No pleural effusion or pneumothorax is identified. Right presumed bibasilar chest tubes projecting over the diaphragmatic   margins.. NG tube extends below the diaphragm with the tip not included in the field-of-view. Right IJ approach [place]-Nia catheter tip extends to the main pulmonary artery level. ET tube tip projects approximately 3.8 cm above the level of the estelita.      Impression:      Impression:    1. Support line and tube placements as described. No visible pneumothorax.  2. Bibasilar linear subsegmental atelectasis.      Electronically Signed: Roxanne Schwarz MD    8/8/2024 12:20 PM EDT    Workstation ID: [id number]            ECHOCARDIOGRAM:    Results for orders placed in visit on 08/08/24    Intra-Op Anesthesia JELANI    Narrative  Intra-Op Anesthesia JELANI    Procedure Performed: Intra-Op Anesthesia JELANI  Start Time:  End Time:    Preanesthesia Checklist:  Patient identified, IV assessed, risks and benefits discussed, monitors and equipment assessed, procedure being performed at surgeon's request and anesthesia consent obtained.    General Procedure Information  Diagnostic  Indications for Echo:  assessment of surgical repair and hemodynamic monitoring  Location performed:  OR  Intubated  Bite block placed  Heart visualized  Probe Insertion:  Easy  Probe Type:  Biplane and multiplane  Modalities:  Color flow mapping, pulse wave Doppler and continuous wave Doppler    Echocardiographic and Doppler Measurements    Ventricles    Right Ventricle:  Cavity size normal.  Hypertrophy not present.  Thrombus not present.  Global function normal.  Ejection Fraction 60%.  Left Ventricle:  Cavity size normal.  Thrombus not present.  Global Function normal.  Ejection Fraction 60%.    Ventricular Regional Function:  1- Basal Anteroseptal:  normal  2- Basal Anterior:  normal  3- Basal Anterolateral:  normal  4- Basal Inferolateral:  normal  5- Basal Inferior:  normal  6- Basal Inferoseptal:  normal  7- Mid Anteroseptal:  normal  8- Mid Anterior:  normal  9- Mid Anterolateral:  normal  10- Mid Inferolateral:  normal  11- Mid Inferior:  normal  12- Mid Inferoseptal:  normal  13- Apical Anterior:  normal  14- Apical Lateral:  normal  15- Apical Inferior:  normal  16- Apical Septal:  normal  17- [place]:  normal      Valves    Aortic Valve:  Annulus normal.  Stenosis not present.  Regurgitation mild.  Leaflets normal.  Leaflet motions restricted.    Mitral Valve:  Annulus normal.  Stenosis not present.  Regurgitation absent.  Leaflets normal.  Leaflet motions normal.    Tricuspid Valve:  Annulus normal.  Stenosis not present.  Regurgitation absent.  Leaflets normal.  Leaflet motions normal.  Pulmonic Valve:  Annulus normal.  Stenosis not present.  Regurgitation absent.      Aorta    Ascending Aorta:  Size dilated.  Diameter 4.2 cm.  Dissection not present.  Plaque thickness less than 3 mm.  Mobile plaque not present.  Aortic Arch:  Size normal.  Dissection not present.  Plaque thickness less than 3 mm.  Mobile plaque not present.  Descending Aorta:  Size normal.  Dissection not present.  Plaque thickness less  than 3 mm.  Mobile plaque not present.      Atria    Right Atrium:  Size normal.  Left Atrium:  Size normal.  Spontaneous echo contrast not present.  Thrombus not present.  Tumor not present.  Device not present.  Left atrial appendage normal.      Septa        Ventricular Septum:  Intra-ventricular septum morphology normal.        Other Findings  Pleural Effusion:  none  Pulmonary Arteries:  normal      Anesthesia Information  Performed Personally  Anesthesiologist:  Mitchel Agustin MD      Echocardiogram Comments:  JELANI placed without difficulty , lubricated , bite guard      Pre CABG JELANI  LV no WMA EF 60  RV nl SF  MV wnl  AV Mild AI, effacement of STJ asc aorta 4.2cm , nl caliber arch  TV trace Tr      S/p cabg  No change        I reviewed the patient's new clinical results.    EKG:      Assessment:       Coronary heart disease    CAD (coronary artery disease)    Ascending aortic aneurysm    1) CAD s/p 3v CABG  - on aspirin, beta blocker, statin    2) Ascending Aortic Aneurysm 4.2 cm per CTA 8/5/24    3) Post op Atrial Fibrillation   - converted to SR on IV amiodarone  - K 3.6, Mg 2.0  - [id number] score = 0  - 2D echo 7/2024 showed EF 56-60% with mild AI    4) HLD    5) GUANAKITO    Plan:   Tele shows brief post-op afib with rates in 150s with conversion to sinus early this morning after starting IV amiodarone.  Beta blocker has been increased.  Check Mg.  [id number] score = 0.          Electronically signed by ALEXANDRIA Tilley, 08/12/24, 10:51 AM EDT.

## 2024-08-12 NOTE — THERAPY TREATMENT NOTE
Subjective: Pt agreeable to therapeutic plan of care.  Cognition: arousal/alertness: Alert and Attentive    Objective:     Precautions - sternal precautions    Bed Mobility: N/A or Not attempted.   Functional Transfers: Supervision     Balance: dynamic, with UE support, and standing Supervision  Functional Ambulation: N/A or Not attempted.    Vitals: WNL    Pain: 0 VAS  Location: NA  Interventions for pain: N/A  Education: Provided education on the importance of mobility in the acute care setting      Assessment: Heladio Clark presents with ADL impairments affecting function including balance, endurance / activity tolerance, pain, sensation / sensory awareness, shortness of breath, and strength. Pt anxious about breaking sternal precautions.  He was educated on time for bone healing and importance of precautions, though assured that he may use his arms within restricted limits.  Demonstrated functioning below baseline abilities indicate the need for continued skilled intervention while inpatient. Tolerating session today without incident. Will continue to follow and progress as tolerated.     Plan/Recommendations:   No ongoing therapy recommended post-acute care. No therapy needs. Pt requires no DME at discharge.     Pt desires Home with family assist at discharge. Pt cooperative; agreeable to therapeutic recommendations and plan of care.     Modified [place]: N/A = No pre-op stroke/TIA    Post-Tx Position: Up in Chair and Alarms activated  PPE: gloves

## 2024-08-12 NOTE — DISCHARGE SUMMARY
Date of Admission: 8/8/2024  Date of Discharge:  8/13/2024    Discharge Diagnosis:   - MV CAD, EF 60% (cath)--s/p elective CABG x 3 with LIMA to LAD, KIMO to Diagonal, KIMO to OM ([place])  - Ascending aortic dilatation, 4.2 cm with mild AI  - Mixed HLD  - GUANAKITO--does not have CPAP yet  - Family hx of premature CAD  - Hyperglycemia--preop A1c 6.23  - Postop leukocytosis, likely reactive  - Postop ABLA, expected  - Postop rapid atrial fib    Presenting Problem/History of Present Illness:  - MV CAD, EF 60% (cath)  - Ascending aortic dilatation, 4.2 cm with mild AI  - Mixed HLD  - GUAANKITO--does not have CPAP yet  - Family hx of premature CAD  - Hyperglycemia--preop A1c 6.23    Hospital Course:  Patient is a 54 y.o. male who presented for elective cardiac surgery on 8/8/24. Patient underwent CABG x 3 (LIMA to LAD, KIMO to Diagonal, KIMO to OM) off pump by Dr. Domingo (see op note for more detail). Patient tolerated the procedure well and was transported to CVU in stable condition. Later that day he was successfully weaned from the ventilator and extubated. POD1, swan and arterial line were removed. Beta blocker was started and patient was IV diuresed. Chest tubes were removed later that day. POD2, central line and giang catheter were removed and the patient was diuresed. POD3, epicardial pacing wires were removed. POD4, patient went into a.fib with RVR and was placed on amiodarone gtt and quickly converted back to SR. Beta blocker was increased. Nocturnal oximetry performed that night revealed 4 minutes of desaturation <89%. On POD5, on 8/13/24, patient was deemed ready for discharge home in sinus rhythm. Patient to follow up in 2-3 weeks with appointment listed below. He should also follow-up in [facility] in 1 year with a CT of the chest without contrast to monitor his ascending aortic aneurysm. He has been placed on the recall list. Patient educated on sternal precautions and signs of infection. He was  discharged on aspirin, statin, and beta blocker.     Procedures Performed:  8/8/24 Dr. Domingo  CABG x 3 (LIMA to LAD, KIMO to Diagonal, KIMO to OM) off pump. Total arterial an aortic off pump CABG.        Consults:   Consults       Date and Time Order Name Status Description    8/8/2024 11:40 AM Inpatient Cardiology Consult Completed             Pertinent Test Results:    Lab Results   Component Value Date    WBC 8.99 08/13/2024    HGB 12.8 (L) 08/13/2024    HCT 39.4 08/13/2024    MCV 86.0 08/13/2024     08/13/2024      Lab Results   Component Value Date    GLUCOSE 163 (H) 08/13/2024    CALCIUM 8.9 08/13/2024     (L) 08/13/2024    K 3.6 08/13/2024    CO2 25.8 08/13/2024    CL 98 08/13/2024    BUN 20 08/13/2024    CREATININE 1.05 08/13/2024    BCR 19.0 08/13/2024    ANIONGAP 10.2 08/13/2024     Lab Results   Component Value Date    INR 1.09 08/09/2024    PROTIME 11.8 (H) 08/09/2024       Condition on Discharge: Stable     Vital Signs  Temp:  [97.8 °F (36.6 °C)-98.4 °F (36.9 °C)] 97.8 °F (36.6 °C)  Heart Rate:  [60-75] 68  Resp:  [19] 19  BP: ()/(68-82) 104/68      Discharge Disposition  Home or Self Care    Discharge Medications     Discharge Medications        New Medications        Instructions Start Date   amiodarone 200 MG tablet  Commonly known as: PACERONE   Take 1 tablet by mouth twice daily for 7 days. Then take 1 tablet by mouth once daily.      cyclobenzaprine 10 MG tablet  Commonly known as: FLEXERIL   5 mg, Oral, 3 Times Daily PRN      empagliflozin 10 MG tablet tablet  Commonly known as: JARDIANCE   10 mg, Oral, Daily   Start Date: August 14, 2024     HYDROcodone-acetaminophen 5-325 MG per tablet  Commonly known as: NORCO   1 tablet, Oral, Every 4 Hours PRN      metoprolol tartrate 25 MG tablet  Commonly known as: LOPRESSOR   25 mg, Oral, Every 12 Hours Scheduled             Continue These Medications        Instructions Start Date   aspirin 81 MG EC tablet   81 mg, Oral,  Daily      atorvastatin 40 MG tablet  Commonly known as: LIPITOR   40 mg, Oral, Daily      nitroglycerin 0.4 MG SL tablet  Commonly known as: NITROSTAT   1 under the tongue as needed for angina, may repeat q5mins for up three doses             Stop These Medications      isosorbide mononitrate 30 MG 24 hr tablet  Commonly known as: IMDUR     mupirocin 2 % ointment  Commonly known as: BACTROBAN              Discharge Diet: Heart healthy     Activity at Discharge:   Activity Instructions       Activity as Tolerated      Bathing Restrictions      No tub baths, hot tubs/jacuzzi tubs, swimming pools, rivers, lakes, oceans for 6 weeks    Type of Restriction: Bathing    Bathing Restrictions: No Tub Bath    Driving Restrictions      Do not resume driving while taking narcotic pain medication    Type of Restriction: Driving    Driving Restrictions: No Driving (Time Limited)    Length: 2 Weeks    Lifting Restrictions      Type of Restriction: Lifting    Lifting Restrictions: Lifting Restriction (Indicate Limit)    Weight Limit (Pounds): 10    Length of Lifting Restriction: 6 weeks          1. No driving for 2 weeks and off narcotic pain medications.  2. Shower daily. Clean incisions with warm water and antibacterial soap only. Do not put any lotion or ointments on incisions.  3. Ambulate for 10 minutes at least 3 times a day.  4. No heavy lifting > 10lbs until seen in office.   5. Take all medications as prescribed.      Follow-up Appointments  Future Appointments   Date Time Provider Department Center   8/29/2024  1:00 PM Fadia Lance APRN [facility]   10/2/2024  2:30 PM Wilmer John MD [facility]     Additional Instructions for the Follow-ups that You Need to Schedule       Call MD With Problems / Concerns   As directed      Instructions: Call for temp >101 or any surgical wound drainage    Order Comments: Instructions: Call for temp >101 or any surgical wound drainage          Discharge Follow-up with PCP   As directed       Currently Documented PCP:    Dilcia Spaulding APRN    PCP Phone Number:    [phone]     Follow Up Details: in one month        Discharge Follow-up with Specialty: Cardiology (Dr. John); 2 Weeks   As directed      Specialty: Cardiology (Dr. John)   Follow Up: 2 Weeks        Discharge Follow-up with Specified Provider: Cardiac Surgery   As directed      To: Cardiac Surgery   Follow Up Details: 8/29/24 at 1:00 pm        Referral to Cardiac Rehab   As directed               ALICIA Antonio  08/13/24  13:07 EDT

## 2024-08-12 NOTE — CASE MANAGEMENT/SOCIAL WORK
Continued Stay Note   Jan     Patient Name: Heladio Clark  MRN: [id number]  Today's Date: 8/12/2024    Admit Date: 8/8/2024    Plan: DC Plan: Return home with spouse. CABG 8/8/24   Discharge Plan       Row Name 08/12/24 1636       Plan    Plan DC Plan: Return home with spouse. CABG 8/8/24    Patient/Family in Agreement with Plan yes    Provided Post Acute Provider List? N/A    Provided Post Acute Provider Quality & Resource List? N/A    Plan Comments CM spoke with patient’s nurse and [facility] NP Fadia Sanchez to obtain clinical updates. PT/OT recommending home with assist. NP agreeable. Patient went into atrial fibrillation and is now on Amiodarone gtt. NP expects DC likely tomorrow 8/13/24. CM reached out to OP Pharmacy for cost analysis on Jardiance. It will be $0 per month with insurance. CM informed NP and nursing of cost findings.CM will continue to follow for any further needs and adjust discharge plan accordingly. DC Barriers: POD 4 OHS, cardiac monitoring, Amiodarone gtt, and monitor labs.                 Expected Discharge Date and Time       Expected Discharge Date Expected Discharge Time    Aug 13, 2024           Phone communication or documentation only- no physical contact with patient or family.      Cristiane Hilario RN     Office Phone: [phone]  Office Cell:     [phone]

## 2024-08-12 NOTE — PLAN OF CARE
Goal Outcome Evaluation:           Assessment: Heladio Clark presents with ADL impairments affecting function including balance, endurance / activity tolerance, pain, sensation / sensory awareness, shortness of breath, and strength. Pt anxious about breaking sternal precautions.  He was educated on time for bone healing and importance of precautions, though assured that he may use his arms within restricted limits.  Demonstrated functioning below baseline abilities indicate the need for continued skilled intervention while inpatient. Tolerating session today without incident. Will continue to follow and progress as tolerated.     Plan/Recommendations:   No ongoing therapy recommended post-acute care. No therapy needs. Pt requires no DME at discharge.

## 2024-08-12 NOTE — THERAPY TREATMENT NOTE
Subjective: Pt agreeable to therapeutic plan of care.  Feels better.     Objective:   Pt sleeping in recliner, A&O, feels like he may need to have BM, also plans to shower this afternoon.   Doing well adhering to sternal precautions.     Phase 1 Cardiac Rehab Initiated - Acute Care    Cardiac Level III Activities  Sitting tolerance: >10min  Standing tolerance: 5-10min    Precautions:  Mid-sternal incision; avoid scapular retraction and depression.  Cardiovascular impairment post-sx; encourage energy conservation strategies.    MET level equivalent: 1.4-2.0 (Self care ADLs in sitting / slow ambulation in room, light intensity activities)      Bed mobility - N/A or Not attempted.  Transfers - Independent, stand from chair, on/off commode in bathroom  Ambulation - 100 feet SBA. No longer using AD    Vitals: WNL    Pain: 1 VAS   Location: incisional  Intervention for pain: Therapeutic Presence    Education: Post-Op Precautions    Assessment: Heladio Clark presents with functional mobility impairments which indicate the need for skilled intervention. He is progressing well with his mobility and doing well maintaining sternal precautions. Nearing d/c with plans for home with A from family. Tolerating session today without incident. Will continue to follow and progress as tolerated.     Plan/Recommendations:   If medically appropriate, No ongoing therapy recommended post-acute care. No therapy needs. Pt requires no DME at discharge.     Pt desires Home with family assist at discharge. Pt cooperative; agreeable to therapeutic recommendations and plan of care.         Basic Mobility 6-click:  Rollin = Total, A lot = 2, A little = 3; 4 = None  Supine>Sit:   1 = Total, A lot = 2, A little = 3; 4 = None   Sit>Stand with arms:  1 = Total, A lot = 2, A little = 3; 4 = None  Bed>Chair:   1 = Total, A lot = 2, A little = 3; 4 = None  Ambulate in room:  1 = Total, A lot = 2, A little = 3; 4 = None  3-5 Steps with railing:   1 = Total, A lot = 2, A little = 3; 4 = None  Score: 24    Modified [place]ar: N/A = No pre-op stroke/TIA    Post-Tx Position: In bathroom and Call light and personal items within reach  PPE: gloves and surgical mask

## 2024-08-12 NOTE — PAYOR COMM NOTE
"This is clinical for Heladio Abdalla   Reference/Auth#: [id number]     EXTENDED AUTHORIZATION PENDING:     Please call or fax determination to contact below.   Thank you.    SIERRA Olivarez, RN, CCM  Utilization Review Nurse  [facility]  Direct & confidential phone # [phone]  Fax # [phone]      Heladio Abdalla (54 y.o. Male)       Date of Birth   1970    Social Security Number       Address   [address] [place] [address] [place] [address]    Home Phone   [phone]    MRN   [id number]       Adventism   Spiritism    Marital Status                               Admission Date   8/8/24    Admission Type   Elective    Admitting Provider   Salvatore Domingo MD    Attending Provider   Salvatore Domingo MD    Department, Room/Bed   [facility] CARDIOVASCULAR CARE UNIT, 3115/1       Discharge Date       Discharge Disposition       Discharge Destination                                 Attending Provider: Salvatore Domingo MD    Allergies: Nuts, Penicillins    Isolation: None   Infection: None   Code Status: CPR    Ht: 175.3 cm (69.02\")   Wt: 102 kg (224 lb 4.8 oz)    Admission Cmt: None   Principal Problem: Coronary heart disease [I25.10]                   Active Insurance as of 8/8/2024       Primary Coverage       Payor Plan Insurance Group Employer/Plan Group    ANTHEM BLUE CROSS [facility] Add2paper BLUE [facility] PPO [id number]       Payor Plan Address Payor Plan Phone Number Payor Plan Fax Number Effective Dates    [address] [phone]  1/1/2020 - None Entered    [place] [address]         Subscriber Name Subscriber Birth Date Member ID       HELADIO ABDALLA 1970 [id number]                     Emergency Contacts        (Rel.) Home Phone Work Phone Mobile Phone    HIRO JOE (Spouse) -- -- [phone]                 Operative/Procedure Notes (last 7 days)        Salvatore Domingo MD at 08/08/24 0757          Operative " Note    Date of Dictation: 08/08/24    Date of Procedure: 08/08/24    Referring Physician: Salvatore Domingo,*    Preoperative diagnosis: Multivessel CAD    Postoperative diagnosis: Same    Procedure:   CABG x 3 (LIMA to LAD, KIMO to Diagonal, KIMO to OM) off pump. Total arterial an aortic off pump CABG.    Surgeon: Salvatore Domingo MD     Assistants: ALICIA Gerber was responsible for performing the following activities: Cardiac Surgery First assist, Endoscopic Vein [place] for CABG, surgical wound closure and their skilled assistance was necessary for the success of this case.     Anesthesia: General endotracheal anesthesia and JELANI    Findings:  The coronaries had a diameter of 1.5 mm and were of good quality. The ascending aorta measures 42mm, so I decided to do the surgery off pump without any proximals in the ascending aorta to decrease the risk of dissection of complications related to the aorta.     Estimated Blood Loss:  100 mL of Cell Saver returned.    STS Data: The STS Risk score discussed with the patient and family.  Counseling was done regarding abuse of tobacco, alcohol and drugs as needed. They understand and wish to proceed. The antibiotics and b blockers were given in the STS required window.          Description of the procedure:     The patient was placed supine on the operative table. General anesthesia was given and lines placed. The patient was prepped and draped using the usual sterile technique. A median sternotomy was performed with a scalpel and the layers carried down to the sternum using the electrocautery. The sternum was split in the midline using a vertical oscillating saw. Hemostasis was achieved. The LIMA and KIMO were harvested skeletonized and prepared with papaverine. The KIMO was used as a free graft. It was of good quality. 300 units/kg of IV heparin was given to an ACT over 400. A Favaloro retractor was placed and the mediastinum exposed, the pericardium was opened  and the edges tacked to the wound. The free KIMO was sewn to the in situ LIMA as a T graft with 7-0 Prolene. The LIMA was sewn to the distal LAD with 7-0 Prolene off pump using a Maquet device. The KIMO was sewn to the first diagonal artery with 7-0 Prolene. The KIMO was sewn to the second obtuse marginal of the circunflex artery with 7-0 Prolene. All anastomoses were checked and had good flow and morphology. The left and right pleural space was suctioned. The matching dose of protamine was given. AV temporary wires and pleural and mediastinal chest tubes were placed and the wound sprayed with platelet rich plasma. The sternum was closed with single and double wires and soft tissue in layers of reabsorbable material. The wounds were covered with sterile dressings.       Specimen removed:  none    Complications:  none           Disposition: Cardiovascular recovery room           Condition: Critical but stable.     Electronically signed by Salvatore Domingo MD at 24 2469          Physician Progress Notes (last 48 hours)        Wilmer John MD at [address] [place] [address] CARDIOLOGY  [facility]        LOS:  LOS: 4 days   Patient Name: Heladio Clark  Age/Sex: 54 y.o. male  : 1970  MRN: [id number]    Day of Service: 24   Length of Stay: 4  Encounter Provider: ALEXANDRIA Tilley  Place of Service: [facility] CARDIOLOGY  Patient Care Team:  Dilcia Spaulding APRN as PCP - General (Family Medicine)      Cardiology assessment and plan      Coronary disease status post CABG x 3  Ascending aortic aneurysm measuring 4.2 cm  Postop atrial fibrillation currently in sinus rhythm currently on IV amiodarone  Low Arian vascular score  Normal LV systolic function  Hyperlipidemia  Obstructive sleep apnea  Postop day 4 status post CABG  CABG x 3 (LIMA to LAD, KIMO to Diagonal, KIMO to OM) off pump. Total arterial an aortic  off pump CABG.     Denies any new complaints except for sternal wound.  Patient was in A-fib last night currently in sinus rhythm  Tmax is 98.2 pulse is 69 respirations are 20 blood pressure is 116/72 sats are 93%  Sodium is 135 potassium is 3.6 creatinine is 0.9 hemoglobin is 12.4  Twelve-lead EKG from early in the morning showing atrial fibrillation with rapid response  Current medications include IV amiodarone drip-patient is on aspirin 81 mg p.o. once a day patient is on Lipitor 40 mg p.o. once a day Lasix 40 mg once a day patient is on metoprolol 25 mg p.o. twice daily and patient is on Lovenox for DVT prophylaxis  Likely transition to oral amiodarone later in the day  Ambulate continue postsurgical care  Further recommendations based on patient course                    Subjective:     Chief Complaint:  F/U CAD    Subjective:   Patient felt palpitations overnight.  Denies SOA currently.  C/o inspiratory pain with breathing.  Has walked around the room.  Denies any hx bleeding, stroke, or blood clots.      Current Medications:   Scheduled Meds:aspirin, 81 mg, Oral, Daily  atorvastatin, 40 mg, Oral, Nightly  chlorhexidine, 15 mL, Mouth/Throat, Q12H  empagliflozin, 10 mg, Oral, Daily  enoxaparin, 40 mg, Subcutaneous, Daily  insulin lispro, 2-7 Units, Subcutaneous, 4x Daily AC & at Bedtime  metoprolol tartrate, 12.5 mg, Oral, Once  metoprolol tartrate, 25 mg, Oral, Q12H  mupirocin, , Each Nare, BID  pantoprazole, 40 mg, Oral, Q AM  polyethylene glycol, 17 g, Oral, BID  senna-docusate sodium, 2 tablet, Oral, BID      Continuous Infusions:amiodarone, 1 mg/min, Last Rate: 1 mg/min (08/12/24 0738)   Followed by  amiodarone, 0.5 mg/min        Allergies:  Allergies   Allergen Reactions    Nuts Anaphylaxis    Penicillins Hives       Review of Systems   Constitutional: Negative for chills, decreased appetite and malaise/fatigue.   HENT:  Negative for congestion and nosebleeds.    Eyes:  Negative for blurred vision and  double vision.   Cardiovascular:  Negative for chest pain, dyspnea on exertion, irregular heartbeat, leg swelling, near-syncope, orthopnea and palpitations.   Respiratory:  Negative for cough and shortness of breath.    Hematologic/Lymphatic: Negative for adenopathy. Does not bruise/bleed easily.   Skin:  Negative for color change and rash.   Musculoskeletal:  Negative for back pain and joint pain.   Gastrointestinal:  Negative for bloating, abdominal pain, heartburn and hematemesis.   Genitourinary:  Negative for flank pain and hematuria.   Neurological:  Negative for dizziness and focal weakness.   Psychiatric/Behavioral:  Negative for altered mental status. The patient does not have insomnia.        Objective:     Temp:  [97.9 °F (36.6 °C)-98.4 °F (36.9 °C)] 98.1 °F (36.7 °C)  Heart Rate:  [] 71  Resp:  [14-21] 20  BP: (108-140)/(59-88) 120/76     Intake/Output Summary (Last 24 hours) at 8/12/2024 1037  Last data filed at 8/12/2024 0600  Gross per 24 hour   Intake 720 ml   Output 625 ml   Net 95 ml     Body mass index is 33.11 kg/m².      08/10/24  0500 08/11/24  0527 08/12/24  0500   Weight: 106 kg (233 lb) 106 kg (234 lb) 102 kg (224 lb 4.8 oz)         Physical Exam:  Neuro:  CV:  Resp:  GI:  Ext:  Tele: AAOx3, no gross deficits  S1S2 RRR, no murmur  shallow, CTA/dim bases  BS+, abd soft  Pedal pulses palp, no edema  SR                                                   Lab Review:   Results from last 7 days   Lab Units 08/12/24  0008 08/11/24  1352 08/11/24  0459   SODIUM mmol/L 135*  --  136   POTASSIUM mmol/L 3.6 3.8 3.8   CHLORIDE mmol/L 98  --  98   CO2 mmol/L 26.2  --  27.5   BUN mg/dL 18  --  13   CREATININE mg/dL 0.96  --  0.85   GLUCOSE mg/dL 94  --  121*   CALCIUM mg/dL 8.8  --  8.8         Results from last 7 days   Lab Units 08/12/24  0008 08/11/24  0459   WBC 10*3/mm3 10.47 11.50*   HEMOGLOBIN g/dL 12.4* 12.5*   HEMATOCRIT % 38.0 39.2   PLATELETS 10*3/mm3 302 230     Results from last 7 days  "  Lab Units 08/09/24  0253 08/08/24  1145   INR  1.09 1.10   APTT seconds  --  31.9*     Results from last 7 days   Lab Units 08/12/24  0008 08/09/24  1122   MAGNESIUM mg/dL 2.0 2.1           Invalid input(s): \"LDLCALC\"            Recent Radiology:  Imaging Results (Most Recent)       Procedure Component Value Units Date/Time    XR Chest 1 View [[id number]] Collected: 08/10/24 1156     Updated: 08/10/24 1200    Narrative:      XR CHEST 1 VW    Date of Exam: 8/10/2024 6:00 AM EDT    Indication: Post-Op Heart Surgery    Comparison:  8/9/2024    Findings:  Right IJ sheath remains. Status post median sternotomy. The heart appears enlarged. Low lung volumes. Possible small pleural effusions. Bibasilar opacities appear grossly unchanged. No definite pneumothorax.      Impression:      Impression:  1.Low lung volumes with possible small pleural effusions and bibasilar opacities, as before.  2.Stable cardiomegaly.        Electronically Signed: Marquis Epperson    8/10/2024 11:58 AM EDT    Workstation ID: [id number]    XR Chest 1 View [[id number]] Collected: 08/09/24 1417     Updated: 08/09/24 1422    Narrative:      XR CHEST 1 VW    Date of Exam: 8/9/2024 2:00 PM EDT    Indication: Chest tube removal    Comparison: 8/9/2024    Findings:  Sternotomy wires overlie the midline. Right IJ introducer sheath again noted. [place]-Nia catheter has been removed. No pneumothorax identified. There are low lung volumes. Cardiomegaly is noted. There is central pulmonary vascular congestion and bibasilar   atelectasis, similar to prior. Some interval improvement of right midlung atelectasis.      Impression:      Impression:    1. Interval removal of [place]-Nia catheter.  2. History states removal of chest tube. No pneumothorax identified.  3. Cardiomegaly with central pulmonary vascular congestion.  4. Low lung volumes with probable bibasilar atelectasis, with some improvement in the right midlung.       Electronically Signed: Humza Harden MD    " 8/9/2024 2:20 PM EDT    Workstation ID: [id number]    XR Chest 1 View [[id number]] Collected: 08/09/24 0706     Updated: 08/09/24 0709    Narrative:      XR CHEST 1 VW    Date of Exam: 8/9/2024 6:11 AM EDT    Indication: Post-Op Heart Surgery    Comparison: 8/8/2024    Findings:  Status post sternotomy. The patient has been extubated. The esophagogastric tube is been removed. There is a stable right IJ [place]-Nia catheter with tip overlying the main pulmonary arterial outflow. Mild right perihilar and linear bibasilar atelectasis   unchanged. Negative for pneumothorax. No significant effusion.      Impression:      Impression:  1. Interval extubation and removal of esophagogastric tube.  2. Stable right IJ [place]-Nia catheter.  3. No pneumothorax.  4. Mild right perihilar and linear bibasilar atelectasis unchanged.        Electronically Signed: Albert Martin MD    8/9/2024 7:07 AM EDT    Workstation ID: [id number]    XR Abdomen KUB [[id number]] Collected: 08/08/24 1219     Updated: 08/08/24 1222    Narrative:      XR ABDOMEN KUB    Date of Exam: 8/8/2024 12:00 PM EDT    Indication: OG placement verification    Comparison: None available.    Findings:  Single supine image. There is considerable artifact over the abdomen. There is an orogastric tube with its tip in the distal stomach. Visualized bowel is nondilated.      Impression:      Impression:  Orogastric tube tip in the distal stomach.      Electronically Signed: Suyapa Fitzgerald MD    8/8/2024 12:20 PM EDT    Workstation ID: [id number]    XR Chest 1 View [[id number]] Collected: 08/08/24 1219     Updated: 08/08/24 1222    Narrative:      XR CHEST 1 VW    Date of Exam: 8/8/2024 12:00 PM EDT    Indication: Post-Op Check Line & Tube Placement    Comparison: CT chest 8/5/2024, PA and lateral chest 8/5/2024    Findings:  Median sternotomy changes are present. Heart size is normal. Linear subsegmental atelectasis is seen in a bibasilar distribution. No pleural effusion or  pneumothorax is identified. Right presumed bibasilar chest tubes projecting over the diaphragmatic   margins.. NG tube extends below the diaphragm with the tip not included in the field-of-view. Right IJ approach [place]-Nia catheter tip extends to the main pulmonary artery level. ET tube tip projects approximately 3.8 cm above the level of the estelita.      Impression:      Impression:    1. Support line and tube placements as described. No visible pneumothorax.  2. Bibasilar linear subsegmental atelectasis.      Electronically Signed: Roxanne Schwarz MD    8/8/2024 12:20 PM EDT    Workstation ID: [id number]            ECHOCARDIOGRAM:    Results for orders placed in visit on 08/08/24    Intra-Op Anesthesia JELANI    Narrative  Intra-Op Anesthesia JELANI    Procedure Performed: Intra-Op Anesthesia JELANI  Start Time:  End Time:    Preanesthesia Checklist:  Patient identified, IV assessed, risks and benefits discussed, monitors and equipment assessed, procedure being performed at surgeon's request and anesthesia consent obtained.    General Procedure Information  Diagnostic Indications for Echo:  assessment of surgical repair and hemodynamic monitoring  Location performed:  OR  Intubated  Bite block placed  Heart visualized  Probe Insertion:  Easy  Probe Type:  Biplane and multiplane  Modalities:  Color flow mapping, pulse wave Doppler and continuous wave Doppler    Echocardiographic and Doppler Measurements    Ventricles    Right Ventricle:  Cavity size normal.  Hypertrophy not present.  Thrombus not present.  Global function normal.  Ejection Fraction 60%.  Left Ventricle:  Cavity size normal.  Thrombus not present.  Global Function normal.  Ejection Fraction 60%.    Ventricular Regional Function:  1- Basal Anteroseptal:  normal  2- Basal Anterior:  normal  3- Basal Anterolateral:  normal  4- Basal Inferolateral:  normal  5- Basal Inferior:  normal  6- Basal Inferoseptal:  normal  7- Mid Anteroseptal:  normal  8- Mid Anterior:   normal  9- Mid Anterolateral:  normal  10- Mid Inferolateral:  normal  11- Mid Inferior:  normal  12- Mid Inferoseptal:  normal  13- Apical Anterior:  normal  14- Apical Lateral:  normal  15- Apical Inferior:  normal  16- Apical Septal:  normal  17- [place]:  normal      Valves    Aortic Valve:  Annulus normal.  Stenosis not present.  Regurgitation mild.  Leaflets normal.  Leaflet motions restricted.    Mitral Valve:  Annulus normal.  Stenosis not present.  Regurgitation absent.  Leaflets normal.  Leaflet motions normal.    Tricuspid Valve:  Annulus normal.  Stenosis not present.  Regurgitation absent.  Leaflets normal.  Leaflet motions normal.  Pulmonic Valve:  Annulus normal.  Stenosis not present.  Regurgitation absent.      Aorta    Ascending Aorta:  Size dilated.  Diameter 4.2 cm.  Dissection not present.  Plaque thickness less than 3 mm.  Mobile plaque not present.  Aortic Arch:  Size normal.  Dissection not present.  Plaque thickness less than 3 mm.  Mobile plaque not present.  Descending Aorta:  Size normal.  Dissection not present.  Plaque thickness less than 3 mm.  Mobile plaque not present.      Atria    Right Atrium:  Size normal.  Left Atrium:  Size normal.  Spontaneous echo contrast not present.  Thrombus not present.  Tumor not present.  Device not present.  Left atrial appendage normal.      Septa        Ventricular Septum:  Intra-ventricular septum morphology normal.        Other Findings  Pleural Effusion:  none  Pulmonary Arteries:  normal      Anesthesia Information  Performed Personally  Anesthesiologist:  Mitchel Agustin MD      Echocardiogram Comments:  JELANI placed without difficulty , lubricated , bite guard      Pre CABG JELANI  LV no WMA EF 60  RV nl SF  MV wnl  AV Mild AI, effacement of STJ asc aorta 4.2cm , nl caliber arch  TV trace Tr      S/p cabg  No change        I reviewed the patient's new clinical results.    EKG:      Assessment:       Coronary heart disease    CAD (coronary artery  disease)    Ascending aortic aneurysm    1) CAD s/p 3v CABG  - on aspirin, beta blocker, statin    2) Ascending Aortic Aneurysm 4.2 cm per CTA 8/5/24    3) Post op Atrial Fibrillation   - converted to SR on IV amiodarone  - K 3.6, Mg 2.0  - DZTOS2BHTY score = 0  - 2D echo 7/2024 showed EF 56-60% with mild AI    4) HLD    5) GUANAKITO    Plan:   Tele shows brief post-op afib with rates in 150s with conversion to sinus early this morning after starting IV amiodarone.  Beta blocker has been increased.  Check Mg.  AESSC5CNFP score = 0.          Electronically signed by ALEXANDRIA Tilley, 08/12/24, 10:51 AM EDT.     Electronically signed by Wilmer John MD at 08/12/24 1119       Satterly-Fadia Garsia APRN at 08/12/24 1031          S/P POD# 4 off pump CABG x3 with bilateral IMAs--[place]  EF 60% (cath)    Subjective:  no c/o's, reports pain improved with chest tube removal    Went into rapid atrial fib this morning ~ 5 am  Drips:  amio 1  Wt is down 3 kgs from preop      Intake/Output Summary (Last 24 hours) at 8/12/2024 1031  Last data filed at 8/12/2024 0600  Gross per 24 hour   Intake 720 ml   Output 625 ml   Net 95 ml     Temp:  [97.9 °F (36.6 °C)-98.4 °F (36.9 °C)] 98.1 °F (36.7 °C)  Heart Rate:  [] 71  Resp:  [14-21] 20  BP: (108-140)/(59-88) 120/76      Results from last 7 days   Lab Units 08/12/24  0008 08/11/24  0459 08/10/24  0228 08/09/24  0253 08/08/24  1219 08/08/24  1145   WBC 10*3/mm3 10.47 11.50*   < > 12.39*  --  14.12*   HEMOGLOBIN g/dL 12.4* 12.5*   < > 11.9*  --  12.6*   HEMOGLOBIN, POC   --   --   --   --    < >  --    HEMATOCRIT % 38.0 39.2   < > 37.5  --  39.3   HEMATOCRIT POC   --   --   --   --    < >  --    PLATELETS 10*3/mm3 302 230   < > 236  --  214   INR   --   --   --  1.09  --  1.10    < > = values in this interval not displayed.     Results from last 7 days   Lab Units 08/12/24  0008 08/09/24  1122 08/09/24  0253   CREATININE mg/dL 0.96   < > 0.93    POTASSIUM mmol/L 3.6   < > 4.2   SODIUM mmol/L 135*   < > 137   MAGNESIUM mg/dL 2.0   < > 1.7   PHOSPHORUS mg/dL  --   --  3.3    < > = values in this interval not displayed.     ica 1.09    Physical Exam:  Neuro intact, nad, up in recliner  Tele:  SR 70s  Diminished bases, 96% 1L  dsg c/d/i, no drainage--has VIR[place]IA dressing in place  Benign abd, + BM  No edema    Assessment/Plan:  Principal Problem:    Coronary heart disease  Active Problems:    CAD (coronary artery disease)    Ascending aortic aneurysm    - MV CAD, EF 60% (cath)--s/p elective CABG x 3 with LIMA to LAD, KIMO to Diagonal, KIMO to OM ([place])  - Ascending aortic dilatation, 4.2 cm with mild AI--per echo, CTA 8/5 asc 4.2 cm  - Mixed HLD--statin  - GUANAKITO--does not have CPAP yet  - Family hx of premature CAD--father MI in his 40s/ CABG  - Hyperglycemia--preop A1c 6.23  - Postop leukocytosis, likely reactive--improving  - Postop ABLA, expected--watch closely  - Postop rapid atrial fib--converted with IV amio    POD# 4.  Doing well except for converting into rapid atrial fib this morning.  Converted with IV amio to sinus.  On asa/statin/increase bb.  Replete e-.  Nocturnal oximetery ordered.  He will require longitudinal surveillance of his thoracic aortic dilatation.  Hopefully home tomorrow.      JULIO CÉSAR?DS?-VASc Score for Atrial Fibrillation Stroke Risk 8/12/2024  1 points  Stroke risk was 0.6% per year in >90,000 patients (the [place] Atrial Fibrillation Cohort Study) and 0.9% risk of stroke/TIA/systemic embolism.      Routine care--as above  D/w pt/nsg, Dr. Domingo, cardiology  Anticipate home at discharge    ALEXANDRIA King  8/12/2024  10:31 EDT    Electronically signed by Fadia Lance APRN at 08/12/24 1205       Marquis Bonilla MD at 08/11/24 1237            Cardiology Progress  Note      Patient Care Team:  McCartin, Dilcia, APRN as PCP - General (Family Medicine)    PATIENT IDENTIFICATION  Name:  "Heladio Clark  Age: 54 y.o.  Sex: male  :  1970  MRN: [id number]      Length of stay:    LOS: 3 days           REASON FOR FOLLOW-UP:  Multivessel coronary artery disease  Status post three-vessel CABG      INTERVAL HISTORY  Patient seen and examined, chart and labs reviewed.  Status post activation, he is alert and oriented x 3.  Pain medicines on board for surgical postop pain.  Rhythm sinus, -1 20 systolic     SUBJECTIVE    No issues overnight  Shortness of breath stable  Surgical pain noted  Modify goal-directed medical therapy  Gentle diuresis  Pain management    Discharge planning per CV surgery    Lines and tubes management per CV surgery    REVIEW OF SYSTEMS:  Pertinent items are noted in HPI, all other systems reviewed and negative    OBJECTIVE   Hemoglobin 11.9    ASSESSMENT  Severe multivessel coronary artery disease status post CABG  Ascending aortic aneurysm-4.2 cm per CTA 2024  Mixed hyperlipidemia  Obstructive sleep apnea  Significant paternal family history of premature coronary disease  Hyperglycemia      RECOMMENDATIONS  Continue current CTS postop protocol  Continue CV supportive care  Monitor rhythm and hemodynamics closely  Monitor and replete electrolytes as needed  Activity as tolerated  Lines and tubes managed per CV surgery, central line out and Pelaez out  No anginal chest pain  Aspirin statin low-dose beta-blocker on board, electrolytes replaced    Will be seen by primary cardiologist tomorrow    Marquis Bonilla MD, PhD          CHF Guideline Directed Medical Therapy  Beta Blocker:   ARNI/ACE/ARB:   SGLT 2 inhibitors:   Diuretics:   Aldosterone Antagonist:   Vasodilators & Nitrates:       Vital Signs  Visit Vitals  /83 (Patient Position: Sitting)   Pulse 74   Temp 98 °F (36.7 °C) (Oral)   Resp 21   Ht 175.3 cm (69.02\")   Wt 106 kg (234 lb)   SpO2 92%   BMI 34.54 kg/m²     Oxygen Therapy  SpO2: 92 %  Pulse Oximetry Type: Continuous  Device (Oxygen Therapy): nasal " "cannula  Flow (L/min): 0.5  Oxygen Concentration (%): 4  Flowsheet Rows      Flowsheet Row First Filed Value   Admission Height 175.3 cm (69.02\") Documented at 08/08/2024 1900   Admission Weight 105 kg (231 lb 6.4 oz) Documented at 08/08/2024 0500          Intake & Output (last 3 days)         08/06 0701 08/07 0700 08/07 0701 08/08 0700 08/08 0701 08/09 0700 08/09 0701  08/10 0700    P.O.   150     I.V. (mL/kg)   2843 (26.8)     Blood   135     IV Piggyback   250     Total Intake(mL/kg)   3378 (31.9)     Urine (mL/kg/hr)   2690 (1.1)     Emesis/NG output   0     Stool   0     Blood   150     Chest Tube   740 70    Wound   0     Total Output   3580 70    Net   -202 -70            Stool Unmeasured Occurrence   0 x     Emesis Unmeasured Occurrence   0 x           Lines, Drains & Airways       Active LDAs       Name Placement date Placement time Site Days    CVC Double Lumen 08/08/24 Right Internal jugular 08/08/24  0705  created via procedure documentation  Internal jugular  1    Peripheral IV 08/08/24 Distal;Posterior;Right Forearm 08/08/24  --  Forearm  1    Chest Tube Mediastinal 08/08/24  --  Mediastinal  1    Urethral Catheter Temperature probe 16 Fr. 08/08/24  --  -- 1    Y Chest Tube 1 and 2 Right Pleural 28 Fr. Left Pleural 28 Fr. 08/08/24  --  -- 1    Pacer Wires --  --  Atrial and Ventricular  --                           /83 (Patient Position: Sitting)   Pulse 74   Temp 98 °F (36.7 °C) (Oral)   Resp 21   Ht 175.3 cm (69.02\")   Wt 106 kg (234 lb)   SpO2 92%   BMI 34.54 kg/m²   Intake/Output last 3 shifts:  I/O last 3 completed shifts:  In: 1681 [P.O.:1200; I.V.:481]  Out: 3300 [Urine:3300]  Intake/Output this shift:  No intake/output data recorded.    PHYSICAL EXAM:    General: Alert, cooperative, no distress, appears stated age  Head:  Normocephalic, atraumatic, mucous membranes moist  Eyes:  Conjunctivae/corneas clear, EOM's intact     Neck:  IJ right  Lungs: Diminished but clear to " auscultation bilaterally, no wheezes, rhonchi or rales are noted  Chest wall: Sternal wound dry and intact  Heart::  Regular rate and rhythm, S1 and S2 normal, no murmur, rub or gallop  Abdomen: Soft, nontender, nondistended, bowel sounds active  Extremities: No cyanosis, clubbing, or edema   Pulses: 2+ and symmetric all extremities  Skin:  No rashes or lesions  Neuro/psych: A&O x3. CN II through XII are grossly intact with appropriate affect    Change from prior encounter    Scheduled Meds:      aspirin, 81 mg, Oral, Daily  atorvastatin, 40 mg, Oral, Nightly  chlorhexidine, 15 mL, Mouth/Throat, Q12H  empagliflozin, 10 mg, Oral, Daily  enoxaparin, 40 mg, Subcutaneous, Daily  furosemide, 40 mg, Intravenous, Daily  insulin lispro, 2-7 Units, Subcutaneous, 4x Daily AC & at Bedtime  metoprolol tartrate, 12.5 mg, Oral, Q12H  mupirocin, , Each Nare, BID  pantoprazole, 40 mg, Oral, Q AM  polyethylene glycol, 17 g, Oral, BID  senna-docusate sodium, 2 tablet, Oral, BID        Continuous Infusions:    sodium chloride, 30 mL/hr, Last Rate: 30 mL/hr (24 1508)        PRN Meds:      acetaminophen **OR** acetaminophen **OR** acetaminophen    Calcium Replacement - Follow Nurse / BPA Driven Protocol    cyclobenzaprine    dextrose    dextrose    glucagon (human recombinant)    hydrALAZINE    HYDROcodone-acetaminophen    Magnesium Cardiology Dose Replacement - Follow Nurse / BPA Driven Protocol    meperidine    [] Morphine **AND** naloxone    nitroglycerin    ondansetron    oxyCODONE    Phosphorus Replacement - Follow Nurse / BPA Driven Protocol    Potassium Replacement - Follow Nurse / BPA Driven Protocol        Results Review:     I reviewed the patient's new clinical results.    CBC    Results from last 7 days   Lab Units 24  0459 08/10/24  0228 24  0253 24  1937 24  1816 24  1646 24  1540 24  1219 24  1145 24  0749 24  0822   WBC 10*3/mm3 11.50* 17.66* 12.39*   --   --   --   --   --  14.12*  --  10.11   HEMOGLOBIN g/dL 12.5* 12.6* 11.9*  --   --   --   --   --  12.6*  --  13.4   HEMOGLOBIN, POC g/dL  --   --   --  14.4 14.1 13.7 14.1   < >  --    < >  --    PLATELETS 10*3/mm3 230 239 236  --   --   --   --   --  214  --  268    < > = values in this interval not displayed.     Cr Clearance Estimated Creatinine Clearance: 120.5 mL/min (by C-G formula based on SCr of 0.85 mg/dL).  Coag   Results from last 7 days   Lab Units 08/09/24  0253 08/08/24  1145 08/05/24  0822   INR  1.09 1.10 0.98   APTT seconds  --  31.9* 32.4*     HbA1C   Lab Results   Component Value Date    HGBA1C 6.23 (H) 08/05/2024     Blood Glucose   Glucose   Date/Time Value Ref Range Status   08/11/2024 1212 122 (H) 70 - 105 mg/dL Final     Comment:     Serial Number: [id number]:  [id number]   08/11/2024 0807 144 (H) 70 - 105 mg/dL Final     Comment:     Serial Number: [id number]:  [id number]   08/10/2024 1705 166 (H) 70 - 105 mg/dL Final     Comment:     Serial Number: [id number]:  [id number]   08/10/2024 1611 159 (H) 70 - 105 mg/dL Final     Comment:     Serial Number: [id number]:  [id number]   08/10/2024 1517 118 (H) 70 - 105 mg/dL Final     Comment:     Serial Number: [id number]:  [id number]   08/10/2024 1411 131 (H) 70 - 105 mg/dL Final     Comment:     Serial Number: [id number]:  [id number]   08/10/2024 1213 162 (H) 70 - 105 mg/dL Final     Comment:     Serial Number: [id number]:  [id number]   08/10/2024 1110 169 (H) 70 - 105 mg/dL Final     Comment:     Serial Number: [id number]:  [id number]     Infection     CMP   Results from last 7 days   Lab Units 08/11/24  0459 08/10/24  0228 08/09/24  0253 08/08/24  1335 08/08/24  1219 08/08/24  1145 08/05/24  0902 08/05/24  0822   SODIUM mmol/L 136 135* 137  --   --  138  --  140   POTASSIUM mmol/L 3.8 4.3 4.2  --   --  4.1  --  3.7   CHLORIDE mmol/L 98 99 105  --   --  108*  --  106   CO2 mmol/L 27.5 27.8 23.4   "--   --  24.2  --  24.0   BUN mg/dL 13 13 12  --   --  9  --  10   CREATININE mg/dL 0.85 0.99 0.93 0.73 0.77 0.79 1.00 0.90   GLUCOSE mg/dL 121* 139* 150*  --   --  129*  --  124*   ALBUMIN g/dL  --   --  3.7  --   --  3.4*  --  3.9   BILIRUBIN mg/dL  --   --   --   --   --   --   --  0.4   ALK PHOS U/L  --   --   --   --   --   --   --  92   AST (SGOT) U/L  --   --   --   --   --   --   --  16   ALT (SGPT) U/L  --   --   --   --   --   --   --  22     ABG    Results from last 7 days   Lab Units 08/08/24  1937 08/08/24  1816 08/08/24  1646 08/08/24  1540 08/08/24  1459 08/08/24  1335 08/08/24  1219   PH, ARTERIAL pH units 7.340* 7.352 7.356 7.282* 7.345* 7.363 7.326*   PCO2, ARTERIAL mm Hg 44.3 43.6 42.4 52.8* 44.1 37.8 43.2   PO2 ART mm Hg 110.7* 129.1* 107.5 104.4 94.5 94.1 86.9   O2 SATURATION ART % 98.0 98.8* 97.9 97.1 96.9 97.1 95.8   BASE EXCESS ART mmol/L -2.0* -1.6* -1.9* -2.5* -1.8* -3.5* -3.4*     UA    Results from last 7 days   Lab Units 08/05/24  0822   NITRITE UA  Negative   WBC UA /HPF 3-5*   BACTERIA UA /HPF None Seen   SQUAM EPITHEL UA /HPF 0-2     VERN  No results found for: \"POCMETH\", \"POCAMPHET\", \"POCBARBITUR\", \"POCBENZO\", \"POCCOCAINE\", \"POCOPIATES\", \"POCOXYCODO\", \"POCPHENCYC\", \"POCPROPOXY\", \"POCTHC\", \"POCTRICYC\"  Lysis Labs   Results from last 7 days   Lab Units 08/11/24  0459 08/10/24  0228 08/09/24  0253 08/08/24  1937 08/08/24  1816 08/08/24  1646 08/08/24  1540 08/08/24  1459 08/08/24  1335 08/08/24  1219 08/08/24  1145 08/08/24  0749 08/05/24  0902 08/05/24  0822   INR   --   --  1.09  --   --   --   --   --   --   --  1.10  --   --  0.98   APTT seconds  --   --   --   --   --   --   --   --   --   --  31.9*  --   --  32.4*   HEMOGLOBIN g/dL 12.5* 12.6* 11.9*  --   --   --   --   --   --   --  12.6*  --   --  13.4   HEMOGLOBIN, POC g/dL  --   --   --  14.4 14.1 13.7 14.1   < > 13.9 12.6  --    < >  --   --    PLATELETS 10*3/mm3 230 239 236  --   --   --   --   --   --   --  214  --   --  268 "   CREATININE mg/dL 0.85 0.99 0.93  --   --   --   --   --  0.73 0.77 0.79  --  1.00 0.90    < > = values in this interval not displayed.     Radiology(recent) XR Chest 1 View    Result Date: 8/10/2024  Impression: 1.Low lung volumes with possible small pleural effusions and bibasilar opacities, as before. 2.Stable cardiomegaly. Electronically Signed: Marquis Epperson  8/10/2024 11:58 AM EDT  Workstation ID: [id number]    XR Chest 1 View    Result Date: 8/9/2024  Impression: 1. Interval removal of [place]-Nia catheter. 2. History states removal of chest tube. No pneumothorax identified. 3. Cardiomegaly with central pulmonary vascular congestion. 4. Low lung volumes with probable bibasilar atelectasis, with some improvement in the right midlung. Electronically Signed: Humza Harden MD  8/9/2024 2:20 PM EDT  Workstation ID: [id number]             X-rays, labs reviewed personally by physician.    ECG/EMG Results (most recent)       Procedure Component Value Units Date/Time    Telemetry Scan [[id number]] Resulted: 08/08/24     Updated: 08/08/24 1702    Telemetry Scan [[id number]] Resulted: 08/08/24     Updated: 08/08/24 2110    Telemetry Scan [[id number]] Resulted: 08/08/24     Updated: 08/08/24 2110    Telemetry Scan [[id number]] Resulted: 08/08/24     Updated: 08/08/24 2137    Telemetry Scan [[id number]] Resulted: 08/08/24     Updated: 08/09/24 0246    Telemetry Scan [[id number]] Resulted: 08/08/24     Updated: 08/09/24 0556    Telemetry Scan [[id number]] Resulted: 08/08/24     Updated: 08/09/24 0824    Telemetry Scan [[id number]] Resulted: 08/08/24     Updated: 08/09/24 1345    Telemetry Scan [[id number]] Resulted: 08/08/24     Updated: 08/09/24 1723    Telemetry Scan [[id number]] Resulted: 08/08/24     Updated: 08/09/24 2102    ECG 12 Lead Other; s/p CABG [[id number]] Collected: 08/09/24 0510     Updated: 08/09/24 2124     QT Interval 360 ms      QTC Interval 413 ms     Narrative:      HEART RATE=79  bpm  RR Arvxwkty=061  ms  MA  Hbmlfavu=862  ms  P Horizontal Axis=-25  deg  P Front Axis=58  deg  QRSD Interval=90  ms  QT Erqzevbv=171  ms  TMeR=551  ms  QRS Axis=36  deg  T Wave Axis=18  deg  - ABNORMAL ECG -  Sinus rhythm  Probable  left atrial enlargement  When compared with ECG of 31-Jul-2024 13:16:55,  Significant rate increase  New or worsened ischemia or infarction  Electronically Signed By: Migel Keane ([facility]) 2024-08-09 21:18:29  Date and Time of Study:2024-08-09 05:10:47    Telemetry Scan [[id number]] Resulted: 08/08/24     Updated: 08/10/24 0700    Telemetry Scan [[id number]] Resulted: 08/08/24     Updated: 08/10/24 0751    Telemetry Scan [[id number]] Resulted: 08/08/24     Updated: 08/10/24 1141    Telemetry Scan [[id number]] Resulted: 08/08/24     Updated: 08/10/24 1239    Telemetry Scan [[id number]] Resulted: 08/08/24     Updated: 08/10/24 1728    ECG 12 Lead Other; s/p CABG [[id number]] Collected: 08/10/24 0451     Updated: 08/10/24 1818     QT Interval 371 ms      QTC Interval 411 ms     Narrative:      HEART RATE=73  bpm  RR Gzyolkzo=529  ms  RI Dtxorbyg=818  ms  P Horizontal Axis=-14  deg  P Front Axis=55  deg  QRSD Interval=88  ms  QT Shbmbaem=763  ms  VSiB=330  ms  QRS Axis=30  deg  T Wave Axis=4  deg  - ABNORMAL ECG -  Sinus rhythm  Anterolateral  infarct, acute (LAD)  When compared with ECG of 09-Aug-2024 05:10:47,  New or worsened ischemia or infarction  Significant axis, voltage or hypertrophy change  Electronically Signed By: Migel Keane ([facility]) 2024-08-10 18:07:17  Date and Time of Study:2024-08-10 04:51:14    ECG 12 Lead Other; s/p CABG [[id number]] Collected: 08/08/24 1809     Updated: 08/10/24 1818     QT Interval 373 ms      QTC Interval 411 ms     Narrative:      HEART RATE=73  bpm  RR Oqljykqm=322  ms  RI Xjjwhhdo=515  ms  P Horizontal Axis=48  deg  P Front Axis=56  deg  QRSD Interval=98  ms  QT Vjqotekw=562  ms  JGlQ=260  ms  QRS Axis=36  deg  T Wave Axis=10  deg  - ABNORMAL ECG -  Sinus rhythm  ST elevation, consider  lateral injury  When compared with ECG of 2024 13:16:55,  No significant change  Electronically Signed By: Migel Keane ([place]) 2024-08-10 18:07:23  Date and Time of Study:2024 18:09:48    Telemetry Scan [[id number]] Resulted: 24     Updated: 08/10/24 2005    Telemetry Scan [[id number]] Resulted: 24     Updated: 24 0848    Telemetry Scan [[id number]] Resulted: 24     Updated: 24 0854    Telemetry Scan [[id number]] Resulted: 24     Updated: 24 0916              Medication Review:   I have reviewed the patient's current medication list  Scheduled Meds:aspirin, 81 mg, Oral, Daily  atorvastatin, 40 mg, Oral, Nightly  chlorhexidine, 15 mL, Mouth/Throat, Q12H  empagliflozin, 10 mg, Oral, Daily  enoxaparin, 40 mg, Subcutaneous, Daily  furosemide, 40 mg, Intravenous, Daily  insulin lispro, 2-7 Units, Subcutaneous, 4x Daily AC & at Bedtime  metoprolol tartrate, 12.5 mg, Oral, Q12H  mupirocin, , Each Nare, BID  pantoprazole, 40 mg, Oral, Q AM  polyethylene glycol, 17 g, Oral, BID  senna-docusate sodium, 2 tablet, Oral, BID      Continuous Infusions:sodium chloride, 30 mL/hr, Last Rate: 30 mL/hr (24 1508)      PRN Meds:.  acetaminophen **OR** acetaminophen **OR** acetaminophen    Calcium Replacement - Follow Nurse / BPA Driven Protocol    cyclobenzaprine    dextrose    dextrose    glucagon (human recombinant)    hydrALAZINE    HYDROcodone-acetaminophen    Magnesium Cardiology Dose Replacement - Follow Nurse / BPA Driven Protocol    meperidine    [] Morphine **AND** naloxone    nitroglycerin    ondansetron    oxyCODONE    Phosphorus Replacement - Follow Nurse / BPA Driven Protocol    Potassium Replacement - Follow Nurse / BPA Driven Protocol    Imaging:  Imaging Results (Last 72 Hours)       Procedure Component Value Units Date/Time    XR Chest 1 View [[id number]] Collected: 08/10/24 1156     Updated: 08/10/24 1200    Narrative:      XR CHEST 1 VW    Date of Exam:  8/10/2024 6:00 AM EDT    Indication: Post-Op Heart Surgery    Comparison:  8/9/2024    Findings:  Right IJ sheath remains. Status post median sternotomy. The heart appears enlarged. Low lung volumes. Possible small pleural effusions. Bibasilar opacities appear grossly unchanged. No definite pneumothorax.      Impression:      Impression:  1.Low lung volumes with possible small pleural effusions and bibasilar opacities, as before.  2.Stable cardiomegaly.        Electronically Signed: Marquis Zeenat    8/10/2024 11:58 AM EDT    Workstation ID: [id number]    XR Chest 1 View [[id number]] Collected: 08/09/24 1417     Updated: 08/09/24 1422    Narrative:      XR CHEST 1 VW    Date of Exam: 8/9/2024 2:00 PM EDT    Indication: Chest tube removal    Comparison: 8/9/2024    Findings:  Sternotomy wires overlie the midline. Right IJ introducer sheath again noted. [place]Nia catheter has been removed. No pneumothorax identified. There are low lung volumes. Cardiomegaly is noted. There is central pulmonary vascular congestion and bibasilar   atelectasis, similar to prior. Some interval improvement of right midlung atelectasis.      Impression:      Impression:    1. Interval removal of [place]-Nia catheter.  2. History states removal of chest tube. No pneumothorax identified.  3. Cardiomegaly with central pulmonary vascular congestion.  4. Low lung volumes with probable bibasilar atelectasis, with some improvement in the right midlung.       Electronically Signed: Humza Harden MD    8/9/2024 2:20 PM EDT    Workstation ID: [id number]    XR Chest 1 View [[id number]] Collected: 08/09/24 0706     Updated: 08/09/24 0709    Narrative:      XR CHEST 1 VW    Date of Exam: 8/9/2024 6:11 AM EDT    Indication: Post-Op Heart Surgery    Comparison: 8/8/2024    Findings:  Status post sternotomy. The patient has been extubated. The esophagogastric tube is been removed. There is a stable right IJ [place]-Nia catheter with tip overlying the main pulmonary  "arterial outflow. Mild right perihilar and linear bibasilar atelectasis   unchanged. Negative for pneumothorax. No significant effusion.      Impression:      Impression:  1. Interval extubation and removal of esophagogastric tube.  2. Stable right IJ [place]-Nia catheter.  3. No pneumothorax.  4. Mild right perihilar and linear bibasilar atelectasis unchanged.        Electronically Signed: Albert Martin MD    8/9/2024 7:07 AM EDT    Workstation ID: [id number]              Marquis Bonilla MD  08/11/24  12:37 EDT          Electronically signed by Marquis Bonilla MD at 08/11/24 1240       Salvatore Domingo MD at 08/11/24 0951           LOS: 3 days   Patient Care Team:  Dilica Spaulding APRN as PCP - General (Family Medicine)    Chief Complaint:       Subjective      Vital Signs  Temp:  [98 °F (36.7 °C)-98.7 °F (37.1 °C)] 98 °F (36.7 °C)  Heart Rate:  [71-82] 74  Resp:  [16-22] 21  BP: (112-137)/(63-84) 137/83  Body mass index is 34.54 kg/m².    Intake/Output Summary (Last 24 hours) at 8/11/2024 0951  Last data filed at 8/11/2024 0527  Gross per 24 hour   Intake 480 ml   Output 1875 ml   Net -1395 ml     No intake/output data recorded.      Wt Readings from Last 3 Encounters:   08/11/24 106 kg (234 lb)   08/05/24 104 kg (228 lb 9.6 oz)   08/02/24 102 kg (225 lb 15.5 oz)         Objective:  Vital signs: (most recent): Blood pressure 137/83, pulse 74, temperature 98 °F (36.7 °C), temperature source Oral, resp. rate 21, height 175.3 cm (69.02\"), weight 106 kg (234 lb), SpO2 92%.                Results Review:        WBC No results found for: \"WBCS\"   HGB Hemoglobin   Date Value Ref Range Status   08/11/2024 12.5 (L) 13.0 - 17.7 g/dL Final   08/10/2024 12.6 (L) 13.0 - 17.7 g/dL Final   08/09/2024 11.9 (L) 13.0 - 17.7 g/dL Final   08/08/2024 14.4 12.0 - 17.0 g/dL Final   08/08/2024 14.1 12.0 - 17.0 g/dL Final   08/08/2024 13.7 12.0 - 17.0 g/dL Final   08/08/2024 14.1 12.0 - 17.0 g/dL Final   08/08/2024 " "13.9 12.0 - 17.0 g/dL Final   08/08/2024 13.9 12.0 - 17.0 g/dL Final   08/08/2024 12.6 12.0 - 17.0 g/dL Final   08/08/2024 12.6 (L) 13.0 - 17.7 g/dL Final   08/08/2024 12.2 12.0 - 17.0 g/dL Final   08/08/2024 12.2 12.0 - 17.0 g/dL Final      HCT Hematocrit   Date Value Ref Range Status   08/11/2024 39.2 37.5 - 51.0 % Final   08/10/2024 39.6 37.5 - 51.0 % Final   08/09/2024 37.5 37.5 - 51.0 % Final   08/08/2024 42 38 - 51 % Final   08/08/2024 41 38 - 51 % Final   08/08/2024 40 38 - 51 % Final   08/08/2024 41 38 - 51 % Final   08/08/2024 41 38 - 51 % Final   08/08/2024 41 38 - 51 % Final   08/08/2024 37 (L) 38 - 51 % Final   08/08/2024 39.3 37.5 - 51.0 % Final   08/08/2024 36 (L) 38 - 51 % Final   08/08/2024 36 (L) 38 - 51 % Final      Platelets No results found for: \"LABPLAT\"     PT/INR:    Protime   Date Value Ref Range Status   08/09/2024 11.8 (H) 9.6 - 11.7 Seconds Final   08/08/2024 11.9 (H) 9.6 - 11.7 Seconds Final   /  INR   Date Value Ref Range Status   08/09/2024 1.09 0.93 - 1.10 Final   08/08/2024 1.10 0.93 - 1.10 Final       Sodium Sodium   Date Value Ref Range Status   08/11/2024 136 136 - 145 mmol/L Final   08/10/2024 135 (L) 136 - 145 mmol/L Final   08/09/2024 137 136 - 145 mmol/L Final   08/08/2024 138 136 - 145 mmol/L Final      Potassium Potassium   Date Value Ref Range Status   08/11/2024 3.8 3.5 - 5.2 mmol/L Final   08/10/2024 4.3 3.5 - 5.2 mmol/L Final   08/09/2024 4.2 3.5 - 5.2 mmol/L Final   08/08/2024 4.1 3.5 - 5.2 mmol/L Final      Chloride Chloride   Date Value Ref Range Status   08/11/2024 98 98 - 107 mmol/L Final   08/10/2024 99 98 - 107 mmol/L Final   08/09/2024 105 98 - 107 mmol/L Final   08/08/2024 108 (H) 98 - 107 mmol/L Final      Bicarbonate No results found for: \"PLASMABICARB\"   BUN BUN   Date Value Ref Range Status   08/11/2024 13 6 - 20 mg/dL Final   08/10/2024 13 6 - 20 mg/dL Final   08/09/2024 12 6 - 20 mg/dL Final   08/08/2024 9 6 - 20 mg/dL Final      Creatinine Creatinine "   Date Value Ref Range Status   08/11/2024 0.85 0.76 - 1.27 mg/dL Final   08/10/2024 0.99 0.76 - 1.27 mg/dL Final   08/09/2024 0.93 0.76 - 1.27 mg/dL Final   08/08/2024 0.73 0.60 - 1.30 mg/dL Final     Comment:     Serial Number: [id number]:  [id number]   08/08/2024 0.77 0.60 - 1.30 mg/dL Final     Comment:     Serial Number: [id number]:  [id number]   08/08/2024 0.79 0.76 - 1.27 mg/dL Final      Calcium Calcium   Date Value Ref Range Status   08/11/2024 8.8 8.6 - 10.5 mg/dL Final   08/10/2024 8.9 8.6 - 10.5 mg/dL Final   08/09/2024 8.2 (L) 8.6 - 10.5 mg/dL Final   08/08/2024 7.6 (L) 8.6 - 10.5 mg/dL Final      Magnesium Magnesium   Date Value Ref Range Status   08/09/2024 2.1 1.6 - 2.6 mg/dL Final   08/09/2024 1.7 1.6 - 2.6 mg/dL Final         .imag    aspirin, 81 mg, Oral, Daily  atorvastatin, 40 mg, Oral, Nightly  chlorhexidine, 15 mL, Mouth/Throat, Q12H  enoxaparin, 40 mg, Subcutaneous, Daily  furosemide, 40 mg, Intravenous, Daily  metoprolol tartrate, 12.5 mg, Oral, Q12H  mupirocin, , Each Nare, BID  pantoprazole, 40 mg, Oral, Q AM  polyethylene glycol, 17 g, Oral, BID  senna-docusate sodium, 2 tablet, Oral, BID      insulin, 0-100 Units/hr, Last Rate: Stopped (08/08/24 1145)  sodium chloride, 30 mL/hr, Last Rate: 30 mL/hr (08/08/24 1508)            Coronary heart disease    CAD (coronary artery disease)    Ascending aortic aneurysm      Assessment & Plan    POD# 3 CABG. Doing well. On asa/statin/ bb. Diuresed well.  Add oxycodone, pt max-ed on Tylenol and Flexeril.  Chest pain is okay.  DC wires. Do nocturnal and walking oximetry. Maybe home tomorrow.       Salvatore Domingo MD  08/11/24  09:51 EDT             Electronically signed by Salvatore Domingo MD at 08/11/24 0952       Marquis Bonilla MD at 08/10/24 1504            Cardiology Progress  Note      Patient Care Team:  Dilcia Spaulding APRN as PCP - General (Family Medicine)    PATIENT IDENTIFICATION  Name: Heladio Clark  Age: 54  "y.o.  Sex: male  :  1970  MRN: [id number]      Length of stay:    LOS: 2 days           REASON FOR FOLLOW-UP:  Multivessel coronary artery disease  Status post three-vessel CABG      INTERVAL HISTORY  Patient seen and examined, chart and labs reviewed.  Patient was extubated 1830 yesterday, sitting in bedside chair.  He is alert and oriented x 3.  He does report significant sternal wound discomfort.  Rhythm sinus, /80.  Drips: IV magnesium replacement.  Plans to DC [place]/A-line today, add BB.      SUBJECTIVE    No issues overnight  Mild shortness of breath  Surgical pain noted  Modify goal-directed medical therapy  Gentle diuresis  Pain management    Discharge planning per CV surgery    REVIEW OF SYSTEMS:  Pertinent items are noted in HPI, all other systems reviewed and negative    OBJECTIVE   Hemoglobin 11.9    ASSESSMENT  Severe multivessel coronary artery disease status post CABG  Ascending aortic aneurysm-4.2 cm per CTA 2024  Mixed hyperlipidemia  Obstructive sleep apnea  Significant paternal family history of premature coronary disease  Hyperglycemia      RECOMMENDATIONS  Continue current CTS postop protocol  Continue CV supportive care  Monitor rhythm and hemodynamics closely  Monitor and replete electrolytes as needed  Activity as tolerated  Lines and tubes managed per CV surgery, central line out and Pelaez out  No anginal chest pain  Aspirin statin low-dose beta-blocker on board, electrolytes replaced    Continue to follow    Marquis Bonilla MD, PhD          CHF Guideline Directed Medical Therapy  Beta Blocker:   ARNI/ACE/ARB:   SGLT 2 inhibitors:   Diuretics:   Aldosterone Antagonist:   Vasodilators & Nitrates:       Vital Signs  Visit Vitals  /78   Pulse 73   Temp 98.4 °F (36.9 °C) (Oral)   Resp 18   Ht 175.3 cm (69.02\")   Wt 106 kg (233 lb)   SpO2 92%   BMI 34.39 kg/m²     Oxygen Therapy  SpO2: 92 %  Pulse Oximetry Type: Continuous  Device (Oxygen Therapy): nasal cannula  Flow " "(L/min): 2  Oxygen Concentration (%): 4  Flowsheet Rows      Flowsheet Row First Filed Value   Admission Height 175.3 cm (69.02\") Documented at 08/08/2024 1900   Admission Weight 105 kg (231 lb 6.4 oz) Documented at 08/08/2024 0500          Intake & Output (last 3 days)         08/06 0701  08/07 0700 08/07 0701  08/08 0700 08/08 0701 08/09 0700 08/09 0701  08/10 0700    P.O.   150     I.V. (mL/kg)   2843 (26.8)     Blood   135     IV Piggyback   250     Total Intake(mL/kg)   3378 (31.9)     Urine (mL/kg/hr)   2690 (1.1)     Emesis/NG output   0     Stool   0     Blood   150     Chest Tube   740 70    Wound   0     Total Output   3580 70    Net   -202 -70            Stool Unmeasured Occurrence   0 x     Emesis Unmeasured Occurrence   0 x           Lines, Drains & Airways       Active LDAs       Name Placement date Placement time Site Days    CVC Double Lumen 08/08/24 Right Internal jugular 08/08/24  0705  created via procedure documentation  Internal jugular  1    Peripheral IV 08/08/24 Distal;Posterior;Right Forearm 08/08/24  --  Forearm  1    Chest Tube Mediastinal 08/08/24  --  Mediastinal  1    Urethral Catheter Temperature probe 16 Fr. 08/08/24  --  -- 1    Y Chest Tube 1 and 2 Right Pleural 28 Fr. Left Pleural 28 Fr. 08/08/24  --  -- 1    Pacer Wires --  --  Atrial and Ventricular  --                           /78   Pulse 73   Temp 98.4 °F (36.9 °C) (Oral)   Resp 18   Ht 175.3 cm (69.02\")   Wt 106 kg (233 lb)   SpO2 92%   BMI 34.39 kg/m²   Intake/Output last 3 shifts:  I/O last 3 completed shifts:  In: 3302 [P.O.:1220; I.V.:2082]  Out: 3575 [Urine:3095; Chest Tube:480]  Intake/Output this shift:  I/O this shift:  In: -   Out: 1275 [Urine:1275]    PHYSICAL EXAM:    General: Alert, cooperative, no distress, appears stated age  Head:  Normocephalic, atraumatic, mucous membranes moist  Eyes:  Conjunctivae/corneas clear, EOM's intact     Neck:  IJ right  Lungs: Diminished but clear to auscultation " bilaterally, no wheezes, rhonchi or rales are noted  Chest wall: Sternal wound dry and intact  Heart::  Regular rate and rhythm, S1 and S2 normal, no murmur, rub or gallop  Abdomen: Soft, nontender, nondistended, bowel sounds active  Extremities: No cyanosis, clubbing, or edema   Pulses: 2+ and symmetric all extremities  Skin:  No rashes or lesions  Neuro/psych: A&O x3. CN II through XII are grossly intact with appropriate affect    Change from prior encounter    Scheduled Meds:      aspirin, 81 mg, Oral, Daily  atorvastatin, 40 mg, Oral, Nightly  chlorhexidine, 15 mL, Mouth/Throat, Q12H  enoxaparin, 40 mg, Subcutaneous, Daily  furosemide, 40 mg, Intravenous, Daily  metoprolol tartrate, 12.5 mg, Oral, Q12H  mupirocin, , Each Nare, BID  pantoprazole, 40 mg, Oral, Q AM  polyethylene glycol, 17 g, Oral, BID  senna-docusate sodium, 2 tablet, Oral, BID        Continuous Infusions:    insulin, 0-100 Units/hr, Last Rate: Stopped (08/08/24 1145)  sodium chloride, 30 mL/hr, Last Rate: 30 mL/hr (08/08/24 1508)        PRN Meds:      acetaminophen **OR** acetaminophen **OR** acetaminophen    Calcium Replacement - Follow Nurse / BPA Driven Protocol    cyclobenzaprine    dextrose    dextrose    glucagon (human recombinant)    hydrALAZINE    HYDROcodone-acetaminophen    Magnesium Cardiology Dose Replacement - Follow Nurse / BPA Driven Protocol    meperidine    Morphine **AND** naloxone    nitroglycerin    ondansetron    oxyCODONE    Phosphorus Replacement - Follow Nurse / BPA Driven Protocol    Potassium Replacement - Follow Nurse / BPA Driven Protocol        Results Review:     I reviewed the patient's new clinical results.    CBC    Results from last 7 days   Lab Units 08/10/24  0228 08/09/24  0253 08/08/24  1937 08/08/24  1816 08/08/24  1646 08/08/24  1540 08/08/24  1459 08/08/24  1219 08/08/24  1145 08/08/24  0749 08/05/24  0822   WBC 10*3/mm3 17.66* 12.39*  --   --   --   --   --   --  14.12*  --  10.11   HEMOGLOBIN g/dL  12.6* 11.9*  --   --   --   --   --   --  12.6*  --  13.4   HEMOGLOBIN, POC g/dL  --   --  14.4 14.1 13.7 14.1 13.9   < >  --    < >  --    PLATELETS 10*3/mm3 239 236  --   --   --   --   --   --  214  --  268    < > = values in this interval not displayed.     Cr Clearance Estimated Creatinine Clearance: 103.5 mL/min (by C-G formula based on SCr of 0.99 mg/dL).  Coag   Results from last 7 days   Lab Units 08/09/24  0253 08/08/24  1145 08/05/24  0822   INR  1.09 1.10 0.98   APTT seconds  --  31.9* 32.4*     HbA1C   Lab Results   Component Value Date    HGBA1C 6.23 (H) 08/05/2024     Blood Glucose   Glucose   Date/Time Value Ref Range Status   08/10/2024 1411 131 (H) 70 - 105 mg/dL Final     Comment:     Serial Number: [id number]:  [id number]   08/10/2024 1213 162 (H) 70 - 105 mg/dL Final     Comment:     Serial Number: [id number]:  [id number]   08/10/2024 1110 169 (H) 70 - 105 mg/dL Final     Comment:     Serial Number: [id number]:  [id number]   08/10/2024 1023 142 (H) 70 - 105 mg/dL Final     Comment:     Serial Number: [id number]:  [id number]   08/10/2024 0900 116 (H) 70 - 105 mg/dL Final     Comment:     Serial Number: [id number]:  [id number]   08/10/2024 0730 128 (H) 70 - 105 mg/dL Final     Comment:     Serial Number: [id number]:  [id number]   08/10/2024 0537 142 (H) 70 - 105 mg/dL Final     Comment:     Serial Number: [id number]:  [id number]   08/10/2024 0052 125 (H) 70 - 105 mg/dL Final     Comment:     Serial Number: [id number]:  [id number]     Infection     CMP   Results from last 7 days   Lab Units 08/10/24  0228 08/09/24  0253 08/08/24  1335 08/08/24  1219 08/08/24  1145 08/05/24  0902 08/05/24  0822   SODIUM mmol/L 135* 137  --   --  138  --  140   POTASSIUM mmol/L 4.3 4.2  --   --  4.1  --  3.7   CHLORIDE mmol/L 99 105  --   --  108*  --  106   CO2 mmol/L 27.8 23.4  --   --  24.2  --  24.0   BUN mg/dL 13 12  --   --  9  --  10   CREATININE mg/dL 0.99 0.93  "0.73 0.77 0.79 1.00 0.90   GLUCOSE mg/dL 139* 150*  --   --  129*  --  124*   ALBUMIN g/dL  --  3.7  --   --  3.4*  --  3.9   BILIRUBIN mg/dL  --   --   --   --   --   --  0.4   ALK PHOS U/L  --   --   --   --   --   --  92   AST (SGOT) U/L  --   --   --   --   --   --  16   ALT (SGPT) U/L  --   --   --   --   --   --  22     ABG    Results from last 7 days   Lab Units 08/08/24  1937 08/08/24  1816 08/08/24  1646 08/08/24  1540 08/08/24  1459 08/08/24  1335 08/08/24  1219   PH, ARTERIAL pH units 7.340* 7.352 7.356 7.282* 7.345* 7.363 7.326*   PCO2, ARTERIAL mm Hg 44.3 43.6 42.4 52.8* 44.1 37.8 43.2   PO2 ART mm Hg 110.7* 129.1* 107.5 104.4 94.5 94.1 86.9   O2 SATURATION ART % 98.0 98.8* 97.9 97.1 96.9 97.1 95.8   BASE EXCESS ART mmol/L -2.0* -1.6* -1.9* -2.5* -1.8* -3.5* -3.4*     UA    Results from last 7 days   Lab Units 08/05/24  0822   NITRITE UA  Negative   WBC UA /HPF 3-5*   BACTERIA UA /HPF None Seen   SQUAM EPITHEL UA /HPF 0-2     VERN  No results found for: \"POCMETH\", \"POCAMPHET\", \"POCBARBITUR\", \"POCBENZO\", \"POCCOCAINE\", \"POCOPIATES\", \"POCOXYCODO\", \"POCPHENCYC\", \"POCPROPOXY\", \"POCTHC\", \"POCTRICYC\"  Lysis Labs   Results from last 7 days   Lab Units 08/10/24  0228 08/09/24  0253 08/08/24  1937 08/08/24  1816 08/08/24  1646 08/08/24  1540 08/08/24  1459 08/08/24  1335 08/08/24  1219 08/08/24  1145 08/08/24  0749 08/05/24  0902 08/05/24  0822   INR   --  1.09  --   --   --   --   --   --   --  1.10  --   --  0.98   APTT seconds  --   --   --   --   --   --   --   --   --  31.9*  --   --  32.4*   HEMOGLOBIN g/dL 12.6* 11.9*  --   --   --   --   --   --   --  12.6*  --   --  13.4   HEMOGLOBIN, POC g/dL  --   --  14.4 14.1 13.7 14.1 13.9 13.9 12.6  --    < >  --   --    PLATELETS 10*3/mm3 239 236  --   --   --   --   --   --   --  214  --   --  268   CREATININE mg/dL 0.99 0.93  --   --   --   --   --  0.73 0.77 0.79  --  1.00 0.90    < > = values in this interval not displayed.     Radiology(recent) XR Chest 1 " View    Result Date: 8/10/2024  Impression: 1.Low lung volumes with possible small pleural effusions and bibasilar opacities, as before. 2.Stable cardiomegaly. Electronically Signed: Marquis Fiorefernando  8/10/2024 11:58 AM EDT  Workstation ID: [id number]    XR Chest 1 View    Result Date: 8/9/2024  Impression: 1. Interval removal of [place]-Nia catheter. 2. History states removal of chest tube. No pneumothorax identified. 3. Cardiomegaly with central pulmonary vascular congestion. 4. Low lung volumes with probable bibasilar atelectasis, with some improvement in the right midlung. Electronically Signed: Humza Harden MD  8/9/2024 2:20 PM EDT  Workstation ID: [id number]    XR Chest 1 View    Result Date: 8/9/2024  Impression: 1. Interval extubation and removal of esophagogastric tube. 2. Stable right IJ [place]-Nia catheter. 3. No pneumothorax. 4. Mild right perihilar and linear bibasilar atelectasis unchanged. Electronically Signed: Albert Martin MD  8/9/2024 7:07 AM EDT  Workstation ID: [id number]             X-rays, labs reviewed personally by physician.    ECG/EMG Results (most recent)       Procedure Component Value Units Date/Time    Telemetry Scan [[id number]] Resulted: 08/08/24     Updated: 08/08/24 1702    Telemetry Scan [[id number]] Resulted: 08/08/24     Updated: 08/08/24 2110    Telemetry Scan [[id number]] Resulted: 08/08/24     Updated: 08/08/24 2110    Telemetry Scan [[id number]] Resulted: 08/08/24     Updated: 08/08/24 2137    Telemetry Scan [[id number]] Resulted: 08/08/24     Updated: 08/09/24 0246    Telemetry Scan [[id number]] Resulted: 08/08/24     Updated: 08/09/24 0556    Telemetry Scan [[id number]] Resulted: 08/08/24     Updated: 08/09/24 0824    Telemetry Scan [[id number]] Resulted: 08/08/24     Updated: 08/09/24 1345    Telemetry Scan [[id number]] Resulted: 08/08/24     Updated: 08/09/24 1723    Telemetry Scan [[id number]] Resulted: 08/08/24     Updated: 08/09/24 2102    ECG 12 Lead Other; s/p CABG [[id number]] Collected:  08/09/24 0510     Updated: 08/09/24 2124     QT Interval 360 ms      QTC Interval 413 ms     Narrative:      HEART RATE=79  bpm  RR Ryalaxqp=479  ms  DE Iwctnczu=556  ms  P Horizontal Axis=-25  deg  P Front Axis=58  deg  QRSD Interval=90  ms  QT Nwdhwtsk=968  ms  IYcT=730  ms  QRS Axis=36  deg  T Wave Axis=18  deg  - ABNORMAL ECG -  Sinus rhythm  Probable  left atrial enlargement  When compared with ECG of 31-Jul-2024 13:16:55,  Significant rate increase  New or worsened ischemia or infarction  Electronically Signed By: Migel Keane ([facility]) 2024-08-09 21:18:29  Date and Time of Study:2024-08-09 05:10:47    ECG 12 Lead Other; s/p CABG [[id number]] Collected: 08/10/24 0451     Updated: 08/10/24 0451     QT Interval 371 ms      QTC Interval 411 ms     Narrative:      HEART RATE=73  bpm  RR Dsabyflf=547  ms  DE Uivyotsb=406  ms  P Horizontal Axis=-14  deg  P Front Axis=55  deg  QRSD Interval=88  ms  QT Fkterwuf=536  ms  LDzO=297  ms  QRS Axis=30  deg  T Wave Axis=4  deg  - ABNORMAL ECG -  Sinus rhythm  Anterolateral infarct, acute (LAD)  Date and Time of Study:2024-08-10 04:51:14    ECG 12 Lead Other; s/p CABG [[id number]] Collected: 08/08/24 1809     Updated: 08/10/24 0605     QT Interval 373 ms      QTC Interval 411 ms     Narrative:      HEART RATE=73  bpm  RR Ofhmnvvo=329  ms  DE Wmlmktqf=897  ms  P Horizontal Axis=48  deg  P Front Axis=56  deg  QRSD Interval=98  ms  QT Jsangeuu=909  ms  LVrW=707  ms  QRS Axis=36  deg  T Wave Axis=10  deg  - ABNORMAL ECG -  Sinus rhythm  ST elevation, consider lateral injury  When compared with ECG of 31-Jul-2024 13:16:55,  No significant change  Date and Time of Study:2024-08-08 18:09:48    Telemetry Scan [[id number]] Resulted: 08/08/24     Updated: 08/10/24 0700    Telemetry Scan [[id number]] Resulted: 08/08/24     Updated: 08/10/24 0751    Telemetry Scan [[id number]] Resulted: 08/08/24     Updated: 08/10/24 1141    Telemetry Scan [[id number]] Resulted: 08/08/24     Updated: 08/10/24 1234               Medication Review:   I have reviewed the patient's current medication list  Scheduled Meds:aspirin, 81 mg, Oral, Daily  atorvastatin, 40 mg, Oral, Nightly  chlorhexidine, 15 mL, Mouth/Throat, Q12H  enoxaparin, 40 mg, Subcutaneous, Daily  furosemide, 40 mg, Intravenous, Daily  metoprolol tartrate, 12.5 mg, Oral, Q12H  mupirocin, , Each Nare, BID  pantoprazole, 40 mg, Oral, Q AM  polyethylene glycol, 17 g, Oral, BID  senna-docusate sodium, 2 tablet, Oral, BID      Continuous Infusions:insulin, 0-100 Units/hr, Last Rate: Stopped (08/08/24 1145)  sodium chloride, 30 mL/hr, Last Rate: 30 mL/hr (08/08/24 1508)      PRN Meds:.  acetaminophen **OR** acetaminophen **OR** acetaminophen    Calcium Replacement - Follow Nurse / BPA Driven Protocol    cyclobenzaprine    dextrose    dextrose    glucagon (human recombinant)    hydrALAZINE    HYDROcodone-acetaminophen    Magnesium Cardiology Dose Replacement - Follow Nurse / BPA Driven Protocol    meperidine    Morphine **AND** naloxone    nitroglycerin    ondansetron    oxyCODONE    Phosphorus Replacement - Follow Nurse / BPA Driven Protocol    Potassium Replacement - Follow Nurse / BPA Driven Protocol    Imaging:  Imaging Results (Last 72 Hours)       Procedure Component Value Units Date/Time    XR Chest 1 View [[id number]] Collected: 08/10/24 1156     Updated: 08/10/24 1200    Narrative:      XR CHEST 1 VW    Date of Exam: 8/10/2024 6:00 AM EDT    Indication: Post-Op Heart Surgery    Comparison:  8/9/2024    Findings:  Right IJ sheath remains. Status post median sternotomy. The heart appears enlarged. Low lung volumes. Possible small pleural effusions. Bibasilar opacities appear grossly unchanged. No definite pneumothorax.      Impression:      Impression:  1.Low lung volumes with possible small pleural effusions and bibasilar opacities, as before.  2.Stable cardiomegaly.        Electronically Signed: Marquis Epperson    8/10/2024 11:58 AM EDT    Workstation ID: [id number]     XR Chest 1 View [[id number]] Collected: 08/09/24 1417     Updated: 08/09/24 1422    Narrative:      XR CHEST 1 VW    Date of Exam: 8/9/2024 2:00 PM EDT    Indication: Chest tube removal    Comparison: 8/9/2024    Findings:  Sternotomy wires overlie the midline. Right IJ introducer sheath again noted. [place]-Nia catheter has been removed. No pneumothorax identified. There are low lung volumes. Cardiomegaly is noted. There is central pulmonary vascular congestion and bibasilar   atelectasis, similar to prior. Some interval improvement of right midlung atelectasis.      Impression:      Impression:    1. Interval removal of [place]-Nia catheter.  2. History states removal of chest tube. No pneumothorax identified.  3. Cardiomegaly with central pulmonary vascular congestion.  4. Low lung volumes with probable bibasilar atelectasis, with some improvement in the right midlung.       Electronically Signed: Humza Harden MD    8/9/2024 2:20 PM EDT    Workstation ID: [id number]    XR Chest 1 View [[id number]] Collected: 08/09/24 0706     Updated: 08/09/24 0709    Narrative:      XR CHEST 1 VW    Date of Exam: 8/9/2024 6:11 AM EDT    Indication: Post-Op Heart Surgery    Comparison: 8/8/2024    Findings:  Status post sternotomy. The patient has been extubated. The esophagogastric tube is been removed. There is a stable right IJ [place]-Nia catheter with tip overlying the main pulmonary arterial outflow. Mild right perihilar and linear bibasilar atelectasis   unchanged. Negative for pneumothorax. No significant effusion.      Impression:      Impression:  1. Interval extubation and removal of esophagogastric tube.  2. Stable right IJ [place]-Nia catheter.  3. No pneumothorax.  4. Mild right perihilar and linear bibasilar atelectasis unchanged.        Electronically Signed: Albert Martin MD    8/9/2024 7:07 AM EDT    Workstation ID: [id number]    XR Abdomen KUB [[id number]] Collected: 08/08/24 1219     Updated: 08/08/24 1222    Narrative:       XR ABDOMEN KUB    Date of Exam: 8/8/2024 12:00 PM EDT    Indication: OG placement verification    Comparison: None available.    Findings:  Single supine image. There is considerable artifact over the abdomen. There is an orogastric tube with its tip in the distal stomach. Visualized bowel is nondilated.      Impression:      Impression:  Orogastric tube tip in the distal stomach.      Electronically Signed: Suyapa Fitzgerald MD    8/8/2024 12:20 PM EDT    Workstation ID: [id number]    XR Chest 1 View [[id number]] Collected: 08/08/24 1219     Updated: 08/08/24 1222    Narrative:      XR CHEST 1 VW    Date of Exam: 8/8/2024 12:00 PM EDT    Indication: Post-Op Check Line & Tube Placement    Comparison: CT chest 8/5/2024, PA and lateral chest 8/5/2024    Findings:  Median sternotomy changes are present. Heart size is normal. Linear subsegmental atelectasis is seen in a bibasilar distribution. No pleural effusion or pneumothorax is identified. Right presumed bibasilar chest tubes projecting over the diaphragmatic   margins.. NG tube extends below the diaphragm with the tip not included in the field-of-view. Right IJ approach [place]-Nia catheter tip extends to the main pulmonary artery level. ET tube tip projects approximately 3.8 cm above the level of the estelita.      Impression:      Impression:    1. Support line and tube placements as described. No visible pneumothorax.  2. Bibasilar linear subsegmental atelectasis.      Electronically Signed: Roxanne Schwarz MD    8/8/2024 12:20 PM EDT    Workstation ID: [id number]              Marquis Bonilla MD  08/10/24  15:04 EDT          Electronically signed by Marquis Bonilla MD at 08/10/24 1505       Consult Notes (last 48 hours)  Notes from 08/10/24 1336 through 08/12/24 1336   No notes of this type exist for this encounter.

## 2024-08-13 ENCOUNTER — READMISSION MANAGEMENT (OUTPATIENT)
Dept: CALL CENTER | Facility: HOSPITAL | Age: 54
End: 2024-08-13
Payer: COMMERCIAL

## 2024-08-13 VITALS
SYSTOLIC BLOOD PRESSURE: 116 MMHG | TEMPERATURE: 97.8 F | RESPIRATION RATE: 19 BRPM | HEART RATE: 65 BPM | WEIGHT: 221 LBS | HEIGHT: 69 IN | DIASTOLIC BLOOD PRESSURE: 70 MMHG | OXYGEN SATURATION: 94 % | BODY MASS INDEX: 32.73 KG/M2

## 2024-08-13 LAB
ANION GAP SERPL CALCULATED.3IONS-SCNC: 10.2 MMOL/L (ref 5–15)
BUN SERPL-MCNC: 20 MG/DL (ref 6–20)
BUN/CREAT SERPL: 19 (ref 7–25)
CALCIUM SPEC-SCNC: 8.9 MG/DL (ref 8.6–10.5)
CHLORIDE SERPL-SCNC: 98 MMOL/L (ref 98–107)
CO2 SERPL-SCNC: 25.8 MMOL/L (ref 22–29)
CREAT SERPL-MCNC: 1.05 MG/DL (ref 0.76–1.27)
DEPRECATED RDW RBC AUTO: 41.8 FL (ref 37–54)
EGFRCR SERPLBLD CKD-EPI 2021: 84.9 ML/MIN/1.73
ERYTHROCYTE [DISTWIDTH] IN BLOOD BY AUTOMATED COUNT: 13.2 % (ref 12.3–15.4)
GLUCOSE BLDC GLUCOMTR-MCNC: 104 MG/DL (ref 70–105)
GLUCOSE BLDC GLUCOMTR-MCNC: 104 MG/DL (ref 70–105)
GLUCOSE SERPL-MCNC: 163 MG/DL (ref 65–99)
HCT VFR BLD AUTO: 39.4 % (ref 37.5–51)
HGB BLD-MCNC: 12.8 G/DL (ref 13–17.7)
MCH RBC QN AUTO: 27.9 PG (ref 26.6–33)
MCHC RBC AUTO-ENTMCNC: 32.5 G/DL (ref 31.5–35.7)
MCV RBC AUTO: 86 FL (ref 79–97)
PLATELET # BLD AUTO: 321 10*3/MM3 (ref 140–450)
PMV BLD AUTO: 8.5 FL (ref 6–12)
POTASSIUM SERPL-SCNC: 3.6 MMOL/L (ref 3.5–5.2)
RBC # BLD AUTO: 4.58 10*6/MM3 (ref 4.14–5.8)
SODIUM SERPL-SCNC: 134 MMOL/L (ref 136–145)
WBC NRBC COR # BLD AUTO: 8.99 10*3/MM3 (ref 3.4–10.8)

## 2024-08-13 PROCEDURE — 97530 THERAPEUTIC ACTIVITIES: CPT

## 2024-08-13 PROCEDURE — 93010 ELECTROCARDIOGRAM REPORT: CPT | Performed by: INTERNAL MEDICINE

## 2024-08-13 PROCEDURE — 80048 BASIC METABOLIC PNL TOTAL CA: CPT | Performed by: NURSE PRACTITIONER

## 2024-08-13 PROCEDURE — 99232 SBSQ HOSP IP/OBS MODERATE 35: CPT | Performed by: INTERNAL MEDICINE

## 2024-08-13 PROCEDURE — 82948 REAGENT STRIP/BLOOD GLUCOSE: CPT

## 2024-08-13 PROCEDURE — 85027 COMPLETE CBC AUTOMATED: CPT | Performed by: NURSE PRACTITIONER

## 2024-08-13 PROCEDURE — 93005 ELECTROCARDIOGRAM TRACING: CPT | Performed by: INTERNAL MEDICINE

## 2024-08-13 RX ORDER — AMIODARONE HYDROCHLORIDE 200 MG/1
200 TABLET ORAL EVERY 12 HOURS SCHEDULED
Status: DISCONTINUED | OUTPATIENT
Start: 2024-08-13 | End: 2024-08-13 | Stop reason: HOSPADM

## 2024-08-13 RX ORDER — POTASSIUM CHLORIDE 20 MEQ/1
40 TABLET, EXTENDED RELEASE ORAL EVERY 4 HOURS
Status: COMPLETED | OUTPATIENT
Start: 2024-08-13 | End: 2024-08-13

## 2024-08-13 RX ORDER — CYCLOBENZAPRINE HCL 10 MG
5 TABLET ORAL 3 TIMES DAILY PRN
Qty: 15 TABLET | Refills: 0 | Status: SHIPPED | OUTPATIENT
Start: 2024-08-13

## 2024-08-13 RX ORDER — AMIODARONE HYDROCHLORIDE 200 MG/1
TABLET ORAL
Qty: 37 TABLET | Refills: 1 | Status: SHIPPED | OUTPATIENT
Start: 2024-08-13 | End: 2024-08-22 | Stop reason: ALTCHOICE

## 2024-08-13 RX ORDER — HYDROCODONE BITARTRATE AND ACETAMINOPHEN 5; 325 MG/1; MG/1
1 TABLET ORAL EVERY 4 HOURS PRN
Qty: 25 TABLET | Refills: 0 | Status: SHIPPED | OUTPATIENT
Start: 2024-08-13

## 2024-08-13 RX ADMIN — AMIODARONE HYDROCHLORIDE 200 MG: 200 TABLET ORAL at 09:54

## 2024-08-13 RX ADMIN — MUPIROCIN 1 APPLICATION: 20 OINTMENT TOPICAL at 09:54

## 2024-08-13 RX ADMIN — METOPROLOL TARTRATE 25 MG: 25 TABLET, FILM COATED ORAL at 09:54

## 2024-08-13 RX ADMIN — CYCLOBENZAPRINE 10 MG: 10 TABLET, FILM COATED ORAL at 09:58

## 2024-08-13 RX ADMIN — CHLORHEXIDINE GLUCONATE, 0.12% ORAL RINSE 15 ML: 1.2 SOLUTION DENTAL at 09:54

## 2024-08-13 RX ADMIN — HYDROCODONE BITARTRATE AND ACETAMINOPHEN 1 TABLET: 5; 325 TABLET ORAL at 09:58

## 2024-08-13 RX ADMIN — POTASSIUM CHLORIDE 40 MEQ: 1500 TABLET, EXTENDED RELEASE ORAL at 05:16

## 2024-08-13 RX ADMIN — POTASSIUM CHLORIDE 40 MEQ: 1500 TABLET, EXTENDED RELEASE ORAL at 09:54

## 2024-08-13 RX ADMIN — ASPIRIN 81 MG: 81 TABLET, COATED ORAL at 09:54

## 2024-08-13 RX ADMIN — EMPAGLIFLOZIN 10 MG: 10 TABLET, FILM COATED ORAL at 09:54

## 2024-08-13 RX ADMIN — PANTOPRAZOLE SODIUM 40 MG: 40 TABLET, DELAYED RELEASE ORAL at 05:16

## 2024-08-13 NOTE — PROGRESS NOTES
[facility] CARDIOLOGY  [facility]        LOS:  LOS: 5 days   Patient Name: Heladio Clark  Age/Sex: 54 y.o. male  : 1970  MRN: [id number]    Day of Service: 24   Length of Stay: 5  Encounter Provider: ALEXANDRIA Tilley  Place of Service: [facility] CARDIOLOGY  Patient Care Team:  Dilcia Spaulding APRN as PCP - General (Family Medicine)    Cardiology assessment and plan        Coronary disease status post CABG x 3  Ascending aortic aneurysm measuring 4.2 cm  Postop atrial fibrillation currently in sinus rhythm currently on IV amiodarone  Low Arian vascular score  Normal LV systolic function  Hyperlipidemia  Obstructive sleep apnea  Postop day 4 status post CABG  CABG x 3 (LIMA to LAD, KIMO to Diagonal, KIMO to OM) off pump. Total arterial an aortic off pump CABG.      Denies any new complaints except for sternal wound.  Patient was in A-fib last night currently in sinus rhythm  Tmax is 97.8 pulse is 65 respirations are 19 blood pressure is 160/70 sats are 94%  Sodium is 134 potassium is 3.6 creatinine is 1.0 hemoglobin is 12.8  Twelve-lead EKG from early in the morning showing atrial fibrillation with rapid response  Current medications include IV amiodarone drip-patient is on aspirin 81 mg p.o. once a day patient is on Lipitor 40 mg p.o. once a day Lasix 40 mg once a day patient is on metoprolol 25 mg p.o. twice daily and patient is on Lovenox for DVT prophylaxis  Transition to p.o. amiodarone  Likely plans to discharge home today  Follow-up in office in 2 weeks                Subjective:     Chief Complaint:  F/U CAD, AF    Subjective:   Patient sitting up in the chair.  He has ambulated out in the hallway.  Still has some inspiratory pain.  Expresses some uneasiness about going home.      Current Medications:   Scheduled Meds:amiodarone, 200 mg, Oral, Q12H  aspirin, 81 mg, Oral, Daily  atorvastatin, 40 mg, Oral,  Nightly  chlorhexidine, 15 mL, Mouth/Throat, Q12H  empagliflozin, 10 mg, Oral, Daily  enoxaparin, 40 mg, Subcutaneous, Daily  insulin lispro, 2-7 Units, Subcutaneous, 4x Daily AC & at Bedtime  metoprolol tartrate, 25 mg, Oral, Q12H  pantoprazole, 40 mg, Oral, Q AM  polyethylene glycol, 17 g, Oral, BID  senna-docusate sodium, 2 tablet, Oral, BID      Continuous Infusions:     Allergies:  Allergies   Allergen Reactions    Nuts Anaphylaxis    Penicillins Hives       Review of Systems   Constitutional: Negative for chills, decreased appetite and malaise/fatigue.   HENT:  Negative for congestion and nosebleeds.    Eyes:  Negative for blurred vision and double vision.   Cardiovascular:  Negative for chest pain, dyspnea on exertion, irregular heartbeat, leg swelling, near-syncope, orthopnea, palpitations and paroxysmal nocturnal dyspnea.   Respiratory:  Negative for cough and shortness of breath.    Hematologic/Lymphatic: Negative for adenopathy. Does not bruise/bleed easily.   Skin:  Negative for color change and rash.   Musculoskeletal:  Negative for back pain and joint pain.   Gastrointestinal:  Negative for bloating, abdominal pain, hematemesis and hematochezia.   Genitourinary:  Negative for flank pain and hematuria.   Neurological:  Negative for dizziness and focal weakness.   Psychiatric/Behavioral:  Negative for altered mental status. The patient does not have insomnia.        Objective:     Temp:  [97.8 °F (36.6 °C)-98.4 °F (36.9 °C)] 97.8 °F (36.6 °C)  Heart Rate:  [60-75] 68  Resp:  [19] 19  BP: ()/(68-82) 104/68     Intake/Output Summary (Last 24 hours) at 8/13/2024 1257  Last data filed at 8/13/2024 0922  Gross per 24 hour   Intake 1433 ml   Output 950 ml   Net 483 ml     Body mass index is 32.62 kg/m².      08/11/24  0527 08/12/24  0500 08/13/24  0459   Weight: 106 kg (234 lb) 102 kg (224 lb 4.8 oz) 100 kg (221 lb)         Physical Exam:  Neuro:  CV:  Resp:  GI:  Ext:  Tele: AAOx3, no gross  "deficits  S1S2 RRR, no murmur  Nonlabored, CTA  BS+, abd soft  Pedal pulses palp, no edema  SR                                                   Lab Review:   Results from last 7 days   Lab Units 08/13/24  0406 08/12/24  1846 08/12/24  0008   SODIUM mmol/L 134*  --  135*   POTASSIUM mmol/L 3.6 3.9 3.6   CHLORIDE mmol/L 98  --  98   CO2 mmol/L 25.8  --  26.2   BUN mg/dL 20  --  18   CREATININE mg/dL 1.05  --  0.96   GLUCOSE mg/dL 163*  --  94   CALCIUM mg/dL 8.9  --  8.8         Results from last 7 days   Lab Units 08/13/24  0406 08/12/24  0008   WBC 10*3/mm3 8.99 10.47   HEMOGLOBIN g/dL 12.8* 12.4*   HEMATOCRIT % 39.4 38.0   PLATELETS 10*3/mm3 321 302     Results from last 7 days   Lab Units 08/09/24  0253 08/08/24  1145   INR  1.09 1.10   APTT seconds  --  31.9*     Results from last 7 days   Lab Units 08/12/24  0008 08/09/24  1122   MAGNESIUM mg/dL 2.0 2.1           Invalid input(s): \"LDLCALC\"            Recent Radiology:  Imaging Results (Most Recent)       Procedure Component Value Units Date/Time    XR Chest 1 View [[id number]] Collected: 08/10/24 1156     Updated: 08/10/24 1200    Narrative:      XR CHEST 1 VW    Date of Exam: 8/10/2024 6:00 AM EDT    Indication: Post-Op Heart Surgery    Comparison:  8/9/2024    Findings:  Right IJ sheath remains. Status post median sternotomy. The heart appears enlarged. Low lung volumes. Possible small pleural effusions. Bibasilar opacities appear grossly unchanged. No definite pneumothorax.      Impression:      Impression:  1.Low lung volumes with possible small pleural effusions and bibasilar opacities, as before.  2.Stable cardiomegaly.        Electronically Signed: Marquis Epperson    8/10/2024 11:58 AM EDT    Workstation ID: [id number]    XR Chest 1 View [[id number]] Collected: 08/09/24 1417     Updated: 08/09/24 1422    Narrative:      XR CHEST 1 VW    Date of Exam: 8/9/2024 2:00 PM EDT    Indication: Chest tube removal    Comparison: 8/9/2024    Findings:  Sternotomy wires " overlie the midline. Right IJ introducer sheath again noted. [place]-Nia catheter has been removed. No pneumothorax identified. There are low lung volumes. Cardiomegaly is noted. There is central pulmonary vascular congestion and bibasilar   atelectasis, similar to prior. Some interval improvement of right midlung atelectasis.      Impression:      Impression:    1. Interval removal of [place]-Nia catheter.  2. History states removal of chest tube. No pneumothorax identified.  3. Cardiomegaly with central pulmonary vascular congestion.  4. Low lung volumes with probable bibasilar atelectasis, with some improvement in the right midlung.       Electronically Signed: Humza Harden MD    8/9/2024 2:20 PM EDT    Workstation ID: [id number]    XR Chest 1 View [[id number]] Collected: 08/09/24 0706     Updated: 08/09/24 0709    Narrative:      XR CHEST 1 VW    Date of Exam: 8/9/2024 6:11 AM EDT    Indication: Post-Op Heart Surgery    Comparison: 8/8/2024    Findings:  Status post sternotomy. The patient has been extubated. The esophagogastric tube is been removed. There is a stable right IJ [place]-Nia catheter with tip overlying the main pulmonary arterial outflow. Mild right perihilar and linear bibasilar atelectasis   unchanged. Negative for pneumothorax. No significant effusion.      Impression:      Impression:  1. Interval extubation and removal of esophagogastric tube.  2. Stable right IJ [place]-Nia catheter.  3. No pneumothorax.  4. Mild right perihilar and linear bibasilar atelectasis unchanged.        Electronically Signed: Albert Martin MD    8/9/2024 7:07 AM EDT    Workstation ID: [id number]    XR Abdomen KUB [[id number]] Collected: 08/08/24 1219     Updated: 08/08/24 1222    Narrative:      XR ABDOMEN KUB    Date of Exam: 8/8/2024 12:00 PM EDT    Indication: OG placement verification    Comparison: None available.    Findings:  Single supine image. There is considerable artifact over the abdomen. There is an orogastric tube  with its tip in the distal stomach. Visualized bowel is nondilated.      Impression:      Impression:  Orogastric tube tip in the distal stomach.      Electronically Signed: Suyapa Fitzgerald MD    8/8/2024 12:20 PM EDT    Workstation ID: [id number]    XR Chest 1 View [[id number]] Collected: 08/08/24 1219     Updated: 08/08/24 1222    Narrative:      XR CHEST 1 VW    Date of Exam: 8/8/2024 12:00 PM EDT    Indication: Post-Op Check Line & Tube Placement    Comparison: CT chest 8/5/2024, PA and lateral chest 8/5/2024    Findings:  Median sternotomy changes are present. Heart size is normal. Linear subsegmental atelectasis is seen in a bibasilar distribution. No pleural effusion or pneumothorax is identified. Right presumed bibasilar chest tubes projecting over the diaphragmatic   margins.. NG tube extends below the diaphragm with the tip not included in the field-of-view. Right IJ approach [place]-Nia catheter tip extends to the main pulmonary artery level. ET tube tip projects approximately 3.8 cm above the level of the estelita.      Impression:      Impression:    1. Support line and tube placements as described. No visible pneumothorax.  2. Bibasilar linear subsegmental atelectasis.      Electronically Signed: Roxanne Schwarz MD    8/8/2024 12:20 PM EDT    Workstation ID: [id number]            ECHOCARDIOGRAM:    Results for orders placed in visit on 08/08/24    Intra-Op Anesthesia JELANI    Narrative  Intra-Op Anesthesia JELANI    Procedure Performed: Intra-Op Anesthesia JELANI  Start Time:  End Time:    Preanesthesia Checklist:  Patient identified, IV assessed, risks and benefits discussed, monitors and equipment assessed, procedure being performed at surgeon's request and anesthesia consent obtained.    General Procedure Information  Diagnostic Indications for Echo:  assessment of surgical repair and hemodynamic monitoring  Location performed:  OR  Intubated  Bite block placed  Heart visualized  Probe Insertion:  Easy  Probe Type:   Biplane and multiplane  Modalities:  Color flow mapping, pulse wave Doppler and continuous wave Doppler    Echocardiographic and Doppler Measurements    Ventricles    Right Ventricle:  Cavity size normal.  Hypertrophy not present.  Thrombus not present.  Global function normal.  Ejection Fraction 60%.  Left Ventricle:  Cavity size normal.  Thrombus not present.  Global Function normal.  Ejection Fraction 60%.    Ventricular Regional Function:  1- Basal Anteroseptal:  normal  2- Basal Anterior:  normal  3- Basal Anterolateral:  normal  4- Basal Inferolateral:  normal  5- Basal Inferior:  normal  6- Basal Inferoseptal:  normal  7- Mid Anteroseptal:  normal  8- Mid Anterior:  normal  9- Mid Anterolateral:  normal  10- Mid Inferolateral:  normal  11- Mid Inferior:  normal  12- Mid Inferoseptal:  normal  13- Apical Anterior:  normal  14- Apical Lateral:  normal  15- Apical Inferior:  normal  16- Apical Septal:  normal  17- [place]:  normal      Valves    Aortic Valve:  Annulus normal.  Stenosis not present.  Regurgitation mild.  Leaflets normal.  Leaflet motions restricted.    Mitral Valve:  Annulus normal.  Stenosis not present.  Regurgitation absent.  Leaflets normal.  Leaflet motions normal.    Tricuspid Valve:  Annulus normal.  Stenosis not present.  Regurgitation absent.  Leaflets normal.  Leaflet motions normal.  Pulmonic Valve:  Annulus normal.  Stenosis not present.  Regurgitation absent.      Aorta    Ascending Aorta:  Size dilated.  Diameter 4.2 cm.  Dissection not present.  Plaque thickness less than 3 mm.  Mobile plaque not present.  Aortic Arch:  Size normal.  Dissection not present.  Plaque thickness less than 3 mm.  Mobile plaque not present.  Descending Aorta:  Size normal.  Dissection not present.  Plaque thickness less than 3 mm.  Mobile plaque not present.      Atria    Right Atrium:  Size normal.  Left Atrium:  Size normal.  Spontaneous echo contrast not present.  Thrombus not present.  Tumor not  present.  Device not present.  Left atrial appendage normal.      Septa        Ventricular Septum:  Intra-ventricular septum morphology normal.        Other Findings  Pleural Effusion:  none  Pulmonary Arteries:  normal      Anesthesia Information  Performed Personally  Anesthesiologist:  Mitchel Agustin MD      Echocardiogram Comments:  JELANI placed without difficulty , lubricated , bite guard      Pre CABG JELANI  LV no WMA EF 60  RV nl SF  MV wnl  AV Mild AI, effacement of STJ asc aorta 4.2cm , nl caliber arch  TV trace Tr      S/p cabg  No change        I reviewed the patient's new clinical results.    EKG:      Assessment:       Coronary heart disease    CAD (coronary artery disease)    Ascending aortic aneurysm    S/P CABG x 3 with LIMA per Dr. Domingo 8/8/2024    Postoperative atrial fibrillation    1) CAD s/p 3v CABG  - on aspirin, beta blocker, statin     2) Ascending Aortic Aneurysm 4.2 cm per CTA 8/5/24     3) Post op Atrial Fibrillation   - converted to SR on IV amiodarone  - K 3.6, Mg 2.0  - [id number] score = 0 (1? Vascular)  - 2D echo 7/2024 showed EF 56-60% with mild AI     4) HLD     5) GUANAKITO    Plan:   No further afib in the last 24 hours.  Will transition IV amio to PO.  Continue on beta blocker.  Hopefully home soon.          Electronically signed by ALEXANDRIA Tilley, 08/13/24, 1:01 PM EDT.

## 2024-08-13 NOTE — PLAN OF CARE
Goal Outcome Evaluation:      Heladio Clark has progressed well post CABG and plans d/c home.  Issued written HEP and spent time reviewing post op precautions, HEP, progression of mobility and eventual plans for OP cardiac rehab.  Pt verbalizes understanding and all questions answered.

## 2024-08-13 NOTE — CASE MANAGEMENT/SOCIAL WORK
Case Management Discharge Note      Final Note: CM spoke with patient’s nurse and [facility] ALICIA Gomez to obtain clinical updates. Patient passed overnight oximetry. DC orders in place. No additional needs for services or DME identified. Patient to transport home via private car with spouse. No barriers.        Transportation Services  Private: Car (with spouse)    Final Discharge Disposition Code: 01 - home or self-care    Phone communication or documentation only- no physical contact with patient or family.  Cristiane Hilario RN    Office Phone: [phone]  Office Cell:     [phone]

## 2024-08-13 NOTE — THERAPY DISCHARGE NOTE
Subjective: Pt agreeable to therapeutic plan of care.  Hopes to go home today.     Objective:     Phase 1 Cardiac Rehab Initiated - Acute Care    Cardiac Level III Activities  Sitting tolerance: >10min  Standing tolerance: >10min    Precautions:  Mid-sternal incision; avoid scapular retraction and depression.  Cardiovascular impairment post-sx; encourage energy conservation strategies.    Therapeutic Exercise: issued post CABG HEP and reviewed with pt, goals for walking and progression of activity at time of d/c.     MET level equivalent: 2.0-3.0 (Unlimited sitting, ambulation on level ground <2mph, light housework)    Bed mobility - N/A or Not attempted.  Transfers - Independent  Ambulation -  has been up walking in room and [place] with nsg, no longer using AD    Vitals: WNL    Pain: 0 VAS   Location:   Intervention for pain: N/A    Education: Post-Op Precautions    Assessment: Heladio ANDREW Frazieresther has progressed well post CABG and plans d/c home.  Issued written HEP and spent time reviewing post op precautions, HEP, progression of mobility and eventual plans for OP cardiac rehab.  Pt verbalizes understanding and all questions answered.     Plan/Recommendations:   If medically appropriate, No ongoing therapy recommended post-acute care. No therapy needs. Pt requires no DME at discharge.     Pt desires Home with family assist at discharge. Pt cooperative; agreeable to therapeutic recommendations and plan of care.       Post-Tx Position: Up in Chair and Call light and personal items within reach  PPE: gloves and surgical mask

## 2024-08-14 LAB
QT INTERVAL: 434 MS
QTC INTERVAL: 467 MS

## 2024-08-14 NOTE — OUTREACH NOTE
Prep Survey      Flowsheet Row Responses   [facility] patient discharged from? Jan   Is LACE score < 7 ? No   Eligibility Readm Mgmt   Discharge diagnosis CABG   Does the patient have one of the following disease processes/diagnoses(primary or secondary)? Cardiothoracic surgery   Does the patient have Home health ordered? No   Is there a DME ordered? No   Prep survey completed? Yes            GIL GORE - Registered Nurse

## 2024-08-20 ENCOUNTER — NURSE TRIAGE (OUTPATIENT)
Dept: CALL CENTER | Facility: HOSPITAL | Age: 54
End: 2024-08-20
Payer: COMMERCIAL

## 2024-08-20 ENCOUNTER — READMISSION MANAGEMENT (OUTPATIENT)
Dept: CALL CENTER | Facility: HOSPITAL | Age: 54
End: 2024-08-20
Payer: COMMERCIAL

## 2024-08-20 NOTE — TELEPHONE ENCOUNTER
Patient reports scant bloody drainage from left drain site yesterday. Drainage has stopped. No signs of infection. Reviewed protocol with patient. Advised patient on home care.    Reason for Disposition   Surgical incision after sutures or staples removed, questions about    Additional Information   Negative: [1] Major abdominal surgical incision AND [2] wound gaping open AND [3] visible internal organs   Negative: Sounds like a life-threatening emergency to the triager   Negative: Patient has a Negative Pressure Wound Therapy device   Negative: Patient is followed by a wound clinic or wound specialist for this wound   Negative: [1] Bleeding from incision AND [2] won't stop after 10 minutes of direct pressure   Negative: [1] Bleeding (more than a few drops) from incision AND [2] tracheostomy or blood vessel surgery (e.g., carotid endarterectomy, femoral bypass graft, kidney dialysis fistula)   Negative: [1] Widespread rash AND [2] bright red, sunburn-like   Negative: Severe pain in the incision   Negative: [1] Incision gaping open AND [2] < 48 hours since wound re-opened   Negative: [1] Incision gaping open AND [2] length of opening > 2 inches (5 cm)   Negative: Patient sounds very sick or weak to the triager   Negative: Sounds like a serious complication to the triager   Negative: Fever > 100.4 F (38.0 C)   Negative: [1] Incision looks infected (spreading redness, pain) AND [2] fever > 99.5 F (37.5 C)   Negative: [1] Incision looks infected (spreading redness, pain) AND [2] large red area (> 2 in. or 5 cm)   Negative: [1] Incision looks infected (spreading redness, pain) AND [2] face wound   Negative: [1] Red streak runs from the incision AND [2] longer than 1 inch (2.5 cm)   Negative: [1] Pus or bad-smelling fluid draining from incision AND [2] no fever   Negative: [1] Post-op pain AND [2] not controlled with pain medications   Negative: Dressing soaked with blood or body fluid (e.g., drainage)   Negative: [1]  "Scant bleeding (e.g., few drops) from incision AND AND [2] tracheostomy or blood vessel surgery (e.g., carotid endarterectomy, femoral bypass graft, kidney dialysis fistula)   Negative: [1] Raised bruise and [2] size > 2 inches (5 cm) and expanding   Negative: [1] Caller has URGENT question AND [2] triager unable to answer question   Negative: [1] INCREASING pain in incision AND [2] > 2 days (48 hours) since surgery   Negative: [1] Small red area or streak AND [2] no fever   Negative: [1] Clear or blood-tinged fluid draining from wound AND [2] no fever   Negative: [1] Incision gaping open AND [2] > 48 hours since wound re-opened AND [3] length of opening > 1/2 inch (12 mm)   Negative: [1] Incision on face gaping open AND [2] > 48 hours since wound re-opened AND [3] length of opening > 1/4 inch (6 mm)   Negative: Suture or staple removal is overdue   Negative: [1] Suture or staple came out early AND [2] caller wants wound checked   Negative: [1] Caller has NON-URGENT question AND [2] triager unable to answer question   Negative: Pimple where a stitch comes through the skin   Negative: Suture (or staple) came out early   Negative: Mild bruising near incision site   Negative: Suture removal date, questions about   Negative: Skin tape (e.g., Steri-Strips) removal, questions about   Negative: Sutured or stapled surgical incision, questions about    Answer Assessment - Initial Assessment Questions  1. SYMPTOM: \"What's the main symptom you're concerned about?\" (e.g., drainage, incision opening up, pain, redness)      Drainage from drain site  2. ONSET: \"When did drainage  start?\"      Yesterday   3. SURGERY: \"What surgery did you have?\"      Open heart surgery  4. DATE of SURGERY: \"When was the surgery?\"       08/08  5. INCISION SITE: \"Where is the incision located?\"       Left chest  6. REDNESS: \"Is there any redness at the incision site?\" If Yes, ask: \"How wide across is the redness?\" (Inches, centimeters)       No   7. " "PAIN: \"Is there any pain?\" If Yes, ask: \"How bad is it?\"  (Scale 1-10; or mild, moderate, severe)    - NONE (0): no pain    - MILD (1-3): doesn't interfere with normal activities     - MODERATE (4-7): interferes with normal activities or awakens from sleep     - SEVERE (8-10): excruciating pain, unable to do any normal activities      No   8. BLEEDING: \"Is there any bleeding?\" If Yes, ask: \"How much?\" and \"Where?\"      Slight bleeding  9. DRAINAGE: \"Is there any drainage from the incision site?\" If Yes, ask: \"What color and how much?\" (e.g., red, cloudy, pus; drops, teaspoon)      Light red  10. FEVER: \"Do you have a fever?\" If Yes, ask: \"What is your temperature, how was it measured, and when did it start?\"        No   11. OTHER SYMPTOMS: \"Do you have any other symptoms?\" (e.g., dizziness, rash elsewhere on body, shaking chills, weakness)        No    Protocols used: Post-Op Incision Symptoms and Questions-ADULT-    "

## 2024-08-20 NOTE — TELEPHONE ENCOUNTER
Spoke to patient regarding drainage. Patient reports a scant amount of dark red blood from his chest tube site that has now stopped. Patient states he has been using antibacterial soap and ointment on his chest tube sites. Advised patient to stop using the antibacterial ointment and instead clean with antibacterial soap and paint with betadine twice a day. Patient verbalized understanding and agreed to plan of care.

## 2024-08-20 NOTE — OUTREACH NOTE
CT Surgery Week 1 Survey      Flowsheet Row Responses   [facility]? [facility]   Does the patient have one of the following disease processes/diagnoses(primary or secondary)? Cardiothoracic surgery   Week 1 attempt successful? No   Unsuccessful attempts Attempt 1  [attempted pt and wife]              SANGEETA AVILES - Registered Nurse

## 2024-08-21 ENCOUNTER — TELEPHONE (OUTPATIENT)
Dept: CARDIOLOGY | Facility: CLINIC | Age: 54
End: 2024-08-21

## 2024-08-22 ENCOUNTER — OFFICE VISIT (OUTPATIENT)
Dept: CARDIOLOGY | Facility: CLINIC | Age: 54
End: 2024-08-22
Payer: COMMERCIAL

## 2024-08-22 VITALS
DIASTOLIC BLOOD PRESSURE: 74 MMHG | HEIGHT: 69 IN | WEIGHT: 216 LBS | BODY MASS INDEX: 31.99 KG/M2 | HEART RATE: 64 BPM | SYSTOLIC BLOOD PRESSURE: 110 MMHG | OXYGEN SATURATION: 98 %

## 2024-08-22 DIAGNOSIS — I71.21 ANEURYSM OF ASCENDING AORTA WITHOUT RUPTURE: ICD-10-CM

## 2024-08-22 DIAGNOSIS — E78.2 MIXED HYPERLIPIDEMIA: ICD-10-CM

## 2024-08-22 DIAGNOSIS — I25.10 CORONARY ARTERY DISEASE INVOLVING NATIVE CORONARY ARTERY OF NATIVE HEART WITHOUT ANGINA PECTORIS: Primary | ICD-10-CM

## 2024-08-22 DIAGNOSIS — Z95.1 S/P CABG X 3: ICD-10-CM

## 2024-08-22 LAB
QT INTERVAL: 291 MS
QTC INTERVAL: 458 MS

## 2024-08-22 RX ORDER — ATORVASTATIN CALCIUM 40 MG/1
40 TABLET, FILM COATED ORAL DAILY
Qty: 90 TABLET | Refills: 3 | Status: SHIPPED | OUTPATIENT
Start: 2024-08-22

## 2024-08-22 RX ORDER — ROSUVASTATIN CALCIUM 20 MG/1
20 TABLET, COATED ORAL DAILY
COMMUNITY
End: 2024-08-22

## 2024-08-22 NOTE — PROGRESS NOTES
"Cardiology Office Visit      Encounter Date:  08/22/2024    Patient ID:   Heldaio Clark is a 54 y.o. male.    Reason For Followup:  Coronary artery disease    Brief Clinical History:  Dear Dilcia Adam APRN    I had the pleasure of seeing Heladio Clark today. As you are well aware, this is a 54 y.o. male with a recent diagnosis of coronary artery disease status post coronary artery bypass surgery here for follow-up    Interval History:  Denies any new cardiac symptoms  Clinically feeling better  Postsurgical pain is much improved  Denies any new exertional cardiac symptoms    Assessment & Plan    Impressions:      Coronary disease status post CABG x 3  Ascending aortic aneurysm measuring 4.2 cm  Postop atrial fibrillation currently in sinus rhythm   Low Arian vascular score  Normal LV systolic function  Hyperlipidemia  Obstructive sleep apnea  CABG x 3 (LIMA to LAD, KIMO to Diagonal, KIMO to OM) off pump.   Impaired fasting glucose and elevated abnormal hemoglobin A1c      Recommendations:  Prior workup reviewed and discussed with patient  Cardiac cath findings hospitalization medical records reviewed and discussed with patient  Continued aggressive risk factor modification  Continue current medical therapy  Finish current prescription for amiodarone and discontinue amiodarone therapy after finishing the current prescription  Schedule l for cardiac rehab  Follow-up with surgery as scheduled  Continue current medical therapy with aspirin 81 mg p.o. once a day Lipitor 40 mg p.o. once a day metoprolol 25 mg p.o. twice daily Jardiance 10 mg p.o. once a day impaired fasting glucose and elevated hemoglobin A1c  Regular exercise and lifestyle changes  Follow-up in office in 3 months      Vitals:  Vitals:    08/22/24 1414   BP: 110/74   Pulse: 64   SpO2: 98%   Weight: 98 kg (216 lb)   Height: 175.3 cm (69\")       Physical Exam:    General: Alert, cooperative, no distress, appears stated age  Head:  Normocephalic, " atraumatic, mucous membranes moist  Eyes:  Conjunctiva/corneas clear, EOM's intact     Neck:  Supple,  no adenopathy;      Lungs: Clear to auscultation bilaterally, no wheezes rhonchi rales are noted  Chest wall: No tenderness  Heart::  Regular rate and rhythm, S1 and S2 normal, no murmur, rub or gallop  Abdomen: Soft, non-tender, nondistended bowel sounds active  Extremities: No cyanosis, clubbing, or edema  Pulses: 2+ and symmetric all extremities  Skin:  No rashes or lesions  Neuro/psych: A&O x3. CN II through XII are grossly intact with appropriate affect              Lab Results   Component Value Date    GLUCOSE 163 (H) 08/13/2024    BUN 20 08/13/2024    CREATININE 1.05 08/13/2024    EGFR 84.9 08/13/2024    BCR 19.0 08/13/2024    K 3.6 08/13/2024    CO2 25.8 08/13/2024    CALCIUM 8.9 08/13/2024    ALBUMIN 3.7 08/09/2024    BILITOT 0.4 08/05/2024    AST 16 08/05/2024    ALT 22 08/05/2024     Results for orders placed in visit on 08/08/24    Intra-Op Anesthesia JELANI    Narrative  Intra-Op Anesthesia JELANI    Procedure Performed: Intra-Op Anesthesia JELANI  Start Time:  End Time:    Preanesthesia Checklist:  Patient identified, IV assessed, risks and benefits discussed, monitors and equipment assessed, procedure being performed at surgeon's request and anesthesia consent obtained.    General Procedure Information  Diagnostic Indications for Echo:  assessment of surgical repair and hemodynamic monitoring  Location performed:  OR  Intubated  Bite block placed  Heart visualized  Probe Insertion:  Easy  Probe Type:  Biplane and multiplane  Modalities:  Color flow mapping, pulse wave Doppler and continuous wave Doppler    Echocardiographic and Doppler Measurements    Ventricles    Right Ventricle:  Cavity size normal.  Hypertrophy not present.  Thrombus not present.  Global function normal.  Ejection Fraction 60%.  Left Ventricle:  Cavity size normal.  Thrombus not present.  Global Function normal.  Ejection Fraction  60%.    Ventricular Regional Function:  1- Basal Anteroseptal:  normal  2- Basal Anterior:  normal  3- Basal Anterolateral:  normal  4- Basal Inferolateral:  normal  5- Basal Inferior:  normal  6- Basal Inferoseptal:  normal  7- Mid Anteroseptal:  normal  8- Mid Anterior:  normal  9- Mid Anterolateral:  normal  10- Mid Inferolateral:  normal  11- Mid Inferior:  normal  12- Mid Inferoseptal:  normal  13- Apical Anterior:  normal  14- Apical Lateral:  normal  15- Apical Inferior:  normal  16- Apical Septal:  normal  17- [place]:  normal      Valves    Aortic Valve:  Annulus normal.  Stenosis not present.  Regurgitation mild.  Leaflets normal.  Leaflet motions restricted.    Mitral Valve:  Annulus normal.  Stenosis not present.  Regurgitation absent.  Leaflets normal.  Leaflet motions normal.    Tricuspid Valve:  Annulus normal.  Stenosis not present.  Regurgitation absent.  Leaflets normal.  Leaflet motions normal.  Pulmonic Valve:  Annulus normal.  Stenosis not present.  Regurgitation absent.      Aorta    Ascending Aorta:  Size dilated.  Diameter 4.2 cm.  Dissection not present.  Plaque thickness less than 3 mm.  Mobile plaque not present.  Aortic Arch:  Size normal.  Dissection not present.  Plaque thickness less than 3 mm.  Mobile plaque not present.  Descending Aorta:  Size normal.  Dissection not present.  Plaque thickness less than 3 mm.  Mobile plaque not present.      Atria    Right Atrium:  Size normal.  Left Atrium:  Size normal.  Spontaneous echo contrast not present.  Thrombus not present.  Tumor not present.  Device not present.  Left atrial appendage normal.      Septa        Ventricular Septum:  Intra-ventricular septum morphology normal.        Other Findings  Pleural Effusion:  none  Pulmonary Arteries:  normal      Anesthesia Information  Performed Personally  Anesthesiologist:  Mitchel Agustin MD      Echocardiogram Comments:  JELANI placed without difficulty , lubricated , bite guard      Pre CABG  JELANI  LV no WMA EF 60  RV nl SF  MV wnl  AV Mild AI, effacement of STJ asc aorta 4.2cm , nl caliber arch  TV trace Tr      S/p cabg  No change     Results for orders placed during the hospital encounter of 08/02/24    Cardiac Catheterization/Vascular Study    Conclusion  Table formatting from the original result was not included.  Cardiac Catheterization Operative Report    Heladio Clark  [id number]  8/2/2024  @PCP@    He underwent cardiac catheterization.    Indications for the procedure include: abnormal stress test.    Results for orders placed during the hospital encounter of 07/11/24    Adult Transthoracic Echo Complete W/ Cont if Necessary Per Protocol    Interpretation Summary    Left ventricular systolic function is normal. Calculated left ventricular EF = 59% Left ventricular ejection fraction appears to be 56 - 60%.    Left ventricular wall thickness is consistent with mild concentric hypertrophy.    Left ventricular diastolic function was normal.    Estimated right ventricular systolic pressure from tricuspid regurgitation is normal (<35 mmHg).    Moderate dilation of the ascending aorta is present.    Ascending aortic aneurysm measuring 4.1 cm    Results for orders placed during the hospital encounter of 07/11/24    Stress Test With Myocardial Perfusion One Day    Interpretation Summary    Exercise myocardial perfusion study with a hypertensive response and also patient noted to have a somewhat nondiagnostic EKG changes with upsloping ST segment depression with exercise.  Myocardial perfusion study shows small apical perfusion defect at rest at peak stress after the exercise there is small to moderate size severe intensity perfusion defect with  small reversible ischemia.    Left ventricular ejection fraction is normal (Calculated EF = 59%).    Findings consistent with an indeterminate ECG stress test.    Impressions are consistent with an intermediate risk study.    Clinical correlation is  recommended        Procedure Details:  The risks, benefits, complications, treatment options, and expected outcomes were discussed with the patient. The patient and/or family concurred with the proposed plan, giving informed consent.    After informed consent the patient was brought to the cath lab after appropriate IV hydration was begun and oral premedication was given. He was further sedated with fentanyl. He was prepped and draped in the usual manner. Using the modified Seldinger access technique, a 6 Maori sheath was placed in the right radial  artery. A left heart catheterization with coronary arteriography was performed. Findings are discussed below.    After the procedure was completed, sedation was stopped and the sheaths and catheters were all removed. Hemostasis was achieved per established hospital protocols.    Conscious sedation:  Conscious sedation was performed according to protocol.  I supervised and directed an independent trained observer with the assistance of monitoring the patient's level of consciousness and physiologic status throughout the procedure.  Intraoperative service time was 60 minutes.    Findings:    Hemodynamics Central artery pressure systolic 104 diastolic 72 with a mean pressure of 86 mmHg  LV end-diastolic pressure of 19 mmHg  There was no gradient across the aortic valve on the pullback of the pigtail catheter  Left Main Large-caliber vessel angiographically bifurcates into left anterior descending and left circumflex arteries  RCA Nondominant small caliber vessel  Right coronary artery is a nondominant vessel provides only RV marginal branches  Right coronary artery has a mid long segment angiographic 90% stenosis  LAD Large-caliber vessel  LAD has proximal angiographic 20 to 30% stenosis  Mid LAD after the second diagonal branch has angiographic area of calcified 99% stenosis and this lesion involves the ostium of the second diagonal branch.  Mid LAD right after the lesion  has a long segment intramyocardial bridging  First diagonal branch of the LAD is a moderate caliber vessel has a mid focal area of angiographic 80% stenosis  Second diagonal branch of the LAD is a moderate caliber vessel with ostial angiographic 70% stenosis and mid long segment angiographic 70% stenosis  Circ Large-caliber vessel dominant vessel  Left circumflex artery has a mid focal area of angiographic 30% stenosis  First marginal branch of left circumflex artery is a moderate caliber vessel has ostial angiographic 30 to 40% stenosis  Second marginal branch of left circumflex artery is a large-caliber vessel has a long segment proximal mid angiographic 90% stenosis  Third marginal branch of the left circumflex artery is a left PDA system angiographically normal  Fourth marginal branch of left circumflex artery works as left PLB system angiographically normal  LV Normal LV systolic function normal wall motion estimate LV ejection fraction of 60%  Coronary Dominance Left circumflex artery    Estimated Blood Loss:  Minimal    Specimens: None    Complications:  None; patient tolerated the procedure well.    Disposition: PACU - hemodynamically stable.    Condition: stable    Impressions:  Severe multivessel coronary artery disease  Severe stenosis involving the mid LAD after the second diagonal branch involving the ostium of the diagonal branch  Severe stenosis involving a moderate-sized first diagonal branch and moderate-sized second diagonal branch  Severe stenosis involving the marginal branch of left circumflex artery  Severe stenosis involving the nondominant right coronary artery  Normal LV systolic function normal wall motion    Recommendations:  Options for PCI versus surgical revascularization reviewed and discussed with patient and family     Lab Results   Component Value Date    CHOL 152 08/05/2024    TRIG 160 (H) 08/05/2024    HDL 35 (L) 08/05/2024    LDL 89 08/05/2024      Results for orders placed  during the hospital encounter of 07/11/24    Stress Test With Myocardial Perfusion One Day    Interpretation Summary    Exercise myocardial perfusion study with a hypertensive response and also patient noted to have a somewhat nondiagnostic EKG changes with upsloping ST segment depression with exercise.  Myocardial perfusion study shows small apical perfusion defect at rest at peak stress after the exercise there is small to moderate size severe intensity perfusion defect with  small reversible ischemia.    Left ventricular ejection fraction is normal (Calculated EF = 59%).    Findings consistent with an indeterminate ECG stress test.    Impressions are consistent with an intermediate risk study.    Clinical correlation is recommended            Objective:          Allergies:  Allergies   Allergen Reactions    Nuts Anaphylaxis    Penicillins Hives       Medication Review:     Current Outpatient Medications:     aspirin 81 MG EC tablet, Take 1 tablet by mouth Daily., Disp: , Rfl:     atorvastatin (LIPITOR) 40 MG tablet, Take 1 tablet by mouth Daily., Disp: 90 tablet, Rfl: 3    cyclobenzaprine (FLEXERIL) 10 MG tablet, Take 0.5 tablets by mouth 3 (Three) Times a Day As Needed for Muscle Spasms., Disp: 15 tablet, Rfl: 0    empagliflozin (JARDIANCE) 10 MG tablet tablet, Take 1 tablet by mouth Daily., Disp: 90 tablet, Rfl: 3    HYDROcodone-acetaminophen (NORCO) 5-325 MG per tablet, Take 1 tablet by mouth Every 4 (Four) Hours As Needed for Moderate Pain., Disp: 25 tablet, Rfl: 0    metoprolol tartrate (LOPRESSOR) 25 MG tablet, Take 1 tablet by mouth Every 12 (Twelve) Hours., Disp: 180 tablet, Rfl: 3    nitroglycerin (NITROSTAT) 0.4 MG SL tablet, 1 under the tongue as needed for angina, may repeat q5mins for up three doses, Disp: 25 tablet, Rfl: 2    Family History:  Family History   Problem Relation Age of Onset    Heart disease Father        Past Medical History:  Past Medical History:   Diagnosis Date    Hyperlipidemia         Past surgical History:  Past Surgical History:   Procedure Laterality Date    CARDIAC CATHETERIZATION N/A 8/2/2024    Procedure: Left Heart Cath possible PCI;  Surgeon: Wilmer John MD;  Location: [facility] CATH INVASIVE LOCATION;  Service: Cardiovascular;  Laterality: N/A;    TONSILLECTOMY         Social History:  Social History     Socioeconomic History    Marital status:    Tobacco Use    Smoking status: Never    Smokeless tobacco: Never   Vaping Use    Vaping status: Never Used   Substance and Sexual Activity    Alcohol use: Yes     Comment: socially    Drug use: Never    Sexual activity: Defer       Review of Systems:  The following systems were reviewed as they relate to the cardiovascular system: Constitutional, Eyes, ENT, Cardiovascular, Respiratory, Gastrointestinal, Integumentary, Neurological, Psychiatric, Hematologic, Endocrine, Musculoskeletal, and Genitourinary. The pertinent cardiovascular findings are reported above with all other cardiovascular points within those systems being negative.    Diagnostic Study Review:     Current Electrocardiogram:    ECG 12 Lead    Date/Time: 8/22/2024 3:55 PM  Performed by: Wilmer John MD    Authorized by: Fadia Lance APRN  Comparison: compared with previous ECG   Similar to previous ECG  Rhythm: sinus rhythm  Rate: normal  BPM: 68  Conduction: conduction normal  QRS axis: normal  Other findings: non-specific ST-T wave changes    Clinical impression: abnormal EKG                NOTE: The following portions of the patient's history were reviewed and updated this visit as appropriate: allergies, current medications, past family history, past medical history, past social history, past surgical history and problem list.

## 2024-08-23 ENCOUNTER — TELEPHONE (OUTPATIENT)
Dept: CARDIAC SURGERY | Facility: CLINIC | Age: 54
End: 2024-08-23
Payer: COMMERCIAL

## 2024-08-23 ENCOUNTER — READMISSION MANAGEMENT (OUTPATIENT)
Dept: CALL CENTER | Facility: HOSPITAL | Age: 54
End: 2024-08-23
Payer: COMMERCIAL

## 2024-08-23 ENCOUNTER — TELEPHONE (OUTPATIENT)
Dept: CARDIAC REHAB | Facility: HOSPITAL | Age: 54
End: 2024-08-23
Payer: COMMERCIAL

## 2024-08-23 NOTE — TELEPHONE ENCOUNTER
Notified patient that he should reach out to Dr. John's office as he performed the cath on 8/2. Patient verbalized understanding.

## 2024-08-23 NOTE — TELEPHONE ENCOUNTER
Caller: Heladio Clark     Relationship: SELF    Best call back number: [phone]     What is your medical concern? PATIENT STATES THAT HIS RIGHT WRIST HAS A KNOT AND IS SORE TO THE TOUCH. PATIENT STATES THAT IF HE USES HIS WRIST A LOT IT GETS WORSE.     How long has this issue been going on? SINCE 8/2/24    Is your provider already aware of this issue? YES     Have you been treated for this issue? HEART CATH 8/2/24

## 2024-08-23 NOTE — OUTREACH NOTE
CT Surgery Week 1 Survey      Flowsheet Row Responses   [facility] patient discharged from? Jan   Does the patient have one of the following disease processes/diagnoses(primary or secondary)? Cardiothoracic surgery   Week 1 attempt successful? Yes   Call start time 1451   Call end time 1458   Discharge diagnosis CABG   Meds reviewed with patient/caregiver? Yes   Is the patient having any side effects they believe may be caused by any medication additions or changes? No   Does the patient have all medications related to this admission filled (includes all antibiotics, pain medications, cardiac medications, etc.) Yes   Is the patient taking all medications as directed (includes completed medication regime)? Yes   Does the patient have a primary care provider?  Yes   Does the patient have an appointment scheduled with their C/T surgeon? Yes   Has the patient kept scheduled appointments due by today? N/A   Has home health visited the patient within 72 hours of discharge? N/A   Psychosocial issues? No   Did the patient receive a copy of their discharge instructions? Yes   Nursing interventions Reviewed instructions with patient   What is the patient's perception of their health status since discharge? Improving   Nursing interventions Nurse provided patient education   Is the patient /caregiver able to teach back basic post-op care? Shower daily, No tub bath, swimming, or hot tub until instructed by MD, Lifting as instructed by MD in discharge instructions, Drive as instructed by MD in discharge instructions   Is the patient/caregiver able to teach back signs and symptoms of incisional infection? Increased redness, swelling or pain at the incisonal site, Increased drainage or bleeding, Incisional warmth, Pus or odor from incision, Fever   Is the patient/caregiver able to teach back steps to recovery at home? Set small, achievable goals for return to baseline health, Eat a well-balance diet, Rest and rebuild  "strength, gradually increase activity   If the patient is a current smoker, are they able to teach back resources for cessation? Not a smoker   Is the patient/caregiver able to teach back the hierarchy of who to call/visit for symptoms/problems? PCP, Specialist, Home health nurse, Urgent Care, ED, 911 Yes   Week 1 call completed? Yes   Wrap up additional comments Pt reports that he is doing well over all and that chest wound looks fine. Pt does have issues with wrist from cath site. Pt states that it is still sore and has an area\"knot\" about the size of a quarter. Advised pt to call surgeon office at this time to inform. Pt VU . Number provided.   Call end time [id number]              DA SABA - Registered Nurse   "

## 2024-08-29 ENCOUNTER — TELEPHONE (OUTPATIENT)
Dept: CARDIAC REHAB | Facility: HOSPITAL | Age: 54
End: 2024-08-29
Payer: COMMERCIAL

## 2024-08-29 ENCOUNTER — OFFICE VISIT (OUTPATIENT)
Dept: CARDIAC SURGERY | Facility: CLINIC | Age: 54
End: 2024-08-29
Payer: COMMERCIAL

## 2024-08-29 VITALS
OXYGEN SATURATION: 97 % | SYSTOLIC BLOOD PRESSURE: 111 MMHG | HEART RATE: 55 BPM | WEIGHT: 212 LBS | DIASTOLIC BLOOD PRESSURE: 84 MMHG | RESPIRATION RATE: 18 BRPM | HEIGHT: 69 IN | BODY MASS INDEX: 31.4 KG/M2 | TEMPERATURE: 97.7 F

## 2024-08-29 DIAGNOSIS — Z95.1 S/P CABG X 3: Primary | ICD-10-CM

## 2024-08-29 PROCEDURE — 99024 POSTOP FOLLOW-UP VISIT: CPT | Performed by: NURSE PRACTITIONER

## 2024-08-29 NOTE — LETTER
"August 30, 2024     ALEXANDRIA Hylton  [address] [place] [address]    Patient: Heladio Clark   YOB: 1970   Date of Visit: 8/29/2024     Dear ALEXANDRIA Hylton:       Thank you for referring Heladio Clark to me for evaluation. Below are the relevant portions of my assessment and plan of care.    If you have questions, please do not hesitate to call me. I look forward to following Heladio along with you.         Sincerely,        ALEXANDRIA King        CC: MD Natalio Rajput Tabitha L, APRN  08/30/24 1233  Sign when Signing Visit  CARDIOVASCULAR SURGERY FOLLOW-UP PROGRESS NOTE  Chief Complaint: Post-op Follow Up        HPI:   Dear Dilcia Adam APRN and colleagues:    It was nice to see Heladio Clark in follow up today after cardiac surgery.  As you know, he is a 54 y.o. male with MV CAD, ascending aortic dilatation (4.2 cm) with mild AI, mixed HLD, GUANAKITO, prediabetes, and family history of premature CAD who underwent elective CABG x 3 with LIMA to LAD, KIMO to Diagonal, KIMO to OM at [facility] by Dr. Domingo on 8/8/2024. He did well postoperatively but did have postop rapid atrial fib.  He converted to sinus rhythm prior to discharge.  He comes in today complaining of getting bored at home.  He is not used to being at home like this.  He is also afraid he is going to disrupt his sternal healing.  His activity level has been good. He has seen cardiology.  He has not started cardiac rehab.  Cardiac rehab called him this morning.  He is alone for his appt today.    Physical Exam:         /84 (BP Location: Left arm, Patient Position: Sitting, Cuff Size: Adult)   Pulse 55   Temp 97.7 °F (36.5 °C)   Resp 18   Ht 175.3 cm (69\")   Wt 96.2 kg (212 lb)   SpO2 97%   BMI 31.31 kg/m²   Heart:  regular rate and rhythm  Lungs:  clear to auscultation bilaterally  Extremities:  no edema  Incision(s):  mid chest healing well, sternum " stable    Assessment/Plan:     S/P elective CABG x 3 with LIMA to LAD, KIMO to Diagonal, KIMO to OM . Overall, he is doing well.  He can start driving and easing back into his normal routine.  He has been sleeping on the couch or in the recliner because he was afraid he would disrupt his sternal healing if he got up out of bed by himself.  His surgical wounds are well-healed.  He should begin cardiac rehab.  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  He will also need to follow-up in the [facility] to do a surveillance of his ascending aortic dilatation.    Post-op atrial fibrillation    Keep incisions clean and dry  OK to drive if not taking narcotic pain medicine  OK to begin cardiac rehab  Follow-up as scheduled with cardiology  Follow-up as scheduled with PCP  Follow-up with CT surgery--in one month    Continue lifting restriction of 10 lbs until 6 weeks and 50 lbs until 12 weeks from the date of surgery, no excessive jarring motions or twisting motions until 12 weeks from the date of surgery.    Thank you for allowing me to participate in the care of your patient.    Regards,  AELXANDRIA King

## 2024-08-29 NOTE — PROGRESS NOTES
"CARDIOVASCULAR SURGERY FOLLOW-UP PROGRESS NOTE  Chief Complaint: Post-op Follow Up        HPI:   Dear Dilcia Adam APRN and colleagues:    It was nice to see Heladio Clark in follow up today after cardiac surgery.  As you know, he is a 54 y.o. male with MV CAD, ascending aortic dilatation (4.2 cm) with mild AI, mixed HLD, GUANAKITO, prediabetes, and family history of premature CAD who underwent elective CABG x 3 with LIMA to LAD, KIMO to Diagonal, KIMO to OM at [facility] by Dr. Domingo on 8/8/2024. He did well postoperatively but did have postop rapid atrial fib.  He converted to sinus rhythm prior to discharge.  He comes in today complaining of getting bored at home.  He is not used to being at home like this.  He is also afraid he is going to disrupt his sternal healing.  His activity level has been good. He has seen cardiology.  He has not started cardiac rehab.  Cardiac rehab called him this morning.  He is alone for his appt today.    Physical Exam:         /84 (BP Location: Left arm, Patient Position: Sitting, Cuff Size: Adult)   Pulse 55   Temp 97.7 °F (36.5 °C)   Resp 18   Ht 175.3 cm (69\")   Wt 96.2 kg (212 lb)   SpO2 97%   BMI 31.31 kg/m²   Heart:  regular rate and rhythm  Lungs:  clear to auscultation bilaterally  Extremities:  no edema  Incision(s):  mid chest healing well, sternum stable    Assessment/Plan:     S/P elective CABG x 3 with LIMA to LAD, KIMO to Diagonal, KIMO to OM . Overall, he is doing well.  He can start driving and easing back into his normal routine.  He has been sleeping on the couch or in the recliner because he was afraid he would disrupt his sternal healing if he got up out of bed by himself.  His surgical wounds are well-healed.  He should begin cardiac rehab.  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  He will also need to follow-up in the [facility] to do a surveillance of his ascending aortic " dilatation.    Post-op atrial fibrillation    Keep incisions clean and dry  OK to drive if not taking narcotic pain medicine  OK to begin cardiac rehab  Follow-up as scheduled with cardiology  Follow-up as scheduled with PCP  Follow-up with CT surgery--in one month    Continue lifting restriction of 10 lbs until 6 weeks and 50 lbs until 12 weeks from the date of surgery, no excessive jarring motions or twisting motions until 12 weeks from the date of surgery.    Thank you for allowing me to participate in the care of your patient.    Regards,  Fadia Lance, APRN

## 2024-09-03 ENCOUNTER — TRANSCRIBE ORDERS (OUTPATIENT)
Dept: CARDIAC REHAB | Facility: HOSPITAL | Age: 54
End: 2024-09-03
Payer: COMMERCIAL

## 2024-09-03 DIAGNOSIS — Z95.1 S/P CABG (CORONARY ARTERY BYPASS GRAFT): Primary | ICD-10-CM

## 2024-09-04 ENCOUNTER — READMISSION MANAGEMENT (OUTPATIENT)
Dept: CALL CENTER | Facility: HOSPITAL | Age: 54
End: 2024-09-04
Payer: COMMERCIAL

## 2024-09-04 NOTE — OUTREACH NOTE
CT Surgery Week 3 Survey      Flowsheet Row Responses   [facility] facility patient discharged from? Jan   Does the patient have one of the following disease processes/diagnoses(primary or secondary)? Cardiothoracic surgery   Week 3 attempt successful? No   Unsuccessful attempts Attempt 1   Revoke Decline to participate            Vianca REDD - Registered Nurse

## 2024-09-06 ENCOUNTER — TELEPHONE (OUTPATIENT)
Dept: CARDIAC REHAB | Facility: HOSPITAL | Age: 54
End: 2024-09-06
Payer: COMMERCIAL

## 2024-09-13 ENCOUNTER — OFFICE VISIT (OUTPATIENT)
Dept: CARDIAC REHAB | Facility: HOSPITAL | Age: 54
End: 2024-09-13
Payer: COMMERCIAL

## 2024-09-13 DIAGNOSIS — Z95.1 S/P CABG (CORONARY ARTERY BYPASS GRAFT): Primary | ICD-10-CM

## 2024-09-13 PROCEDURE — 93798 PHYS/QHP OP CAR RHAB W/ECG: CPT

## 2024-09-16 ENCOUNTER — TREATMENT (OUTPATIENT)
Dept: CARDIAC REHAB | Facility: HOSPITAL | Age: 54
End: 2024-09-16
Payer: COMMERCIAL

## 2024-09-16 ENCOUNTER — TELEPHONE (OUTPATIENT)
Dept: CARDIOLOGY | Facility: CLINIC | Age: 54
End: 2024-09-16

## 2024-09-16 DIAGNOSIS — Z95.1 S/P CABG (CORONARY ARTERY BYPASS GRAFT): ICD-10-CM

## 2024-09-16 PROCEDURE — 93798 PHYS/QHP OP CAR RHAB W/ECG: CPT

## 2024-09-16 NOTE — TELEPHONE ENCOUNTER
Caller: Heladio Clark    Relationship: Self    Best call back number: [phone]    Which medication are you concerned about:   METOPROLOL, ATORVASTATIN, EMPAGLIFLOZIN, NITROGLYCERIN    Who prescribed you this medication:  PENDYALA    What are your concerns: PATIENT WAS GIVEN MEDICATIONS IN THE HOSPITAL AND WANTS TO MAKE SURE ALL PRESCRIPTIONS ARE UP TO DATE. PLEASE ADVISE.

## 2024-09-18 ENCOUNTER — TREATMENT (OUTPATIENT)
Dept: CARDIAC REHAB | Facility: HOSPITAL | Age: 54
End: 2024-09-18
Payer: COMMERCIAL

## 2024-09-18 DIAGNOSIS — Z95.1 S/P CABG (CORONARY ARTERY BYPASS GRAFT): Primary | ICD-10-CM

## 2024-09-18 PROCEDURE — 93798 PHYS/QHP OP CAR RHAB W/ECG: CPT

## 2024-09-19 ENCOUNTER — OFFICE VISIT (OUTPATIENT)
Dept: CARDIAC SURGERY | Facility: CLINIC | Age: 54
End: 2024-09-19
Payer: COMMERCIAL

## 2024-09-19 VITALS
TEMPERATURE: 97.5 F | HEART RATE: 57 BPM | SYSTOLIC BLOOD PRESSURE: 125 MMHG | RESPIRATION RATE: 20 BRPM | DIASTOLIC BLOOD PRESSURE: 80 MMHG | BODY MASS INDEX: 32.29 KG/M2 | OXYGEN SATURATION: 96 % | WEIGHT: 218 LBS | HEIGHT: 69 IN

## 2024-09-19 DIAGNOSIS — Z95.1 S/P CABG X 3: Primary | ICD-10-CM

## 2024-09-19 PROCEDURE — 99024 POSTOP FOLLOW-UP VISIT: CPT | Performed by: NURSE PRACTITIONER

## 2024-09-20 ENCOUNTER — TREATMENT (OUTPATIENT)
Dept: CARDIAC REHAB | Facility: HOSPITAL | Age: 54
End: 2024-09-20
Payer: COMMERCIAL

## 2024-09-20 DIAGNOSIS — Z95.1 S/P CABG (CORONARY ARTERY BYPASS GRAFT): Primary | ICD-10-CM

## 2024-09-20 PROCEDURE — 93798 PHYS/QHP OP CAR RHAB W/ECG: CPT

## 2024-09-23 ENCOUNTER — TREATMENT (OUTPATIENT)
Dept: CARDIAC REHAB | Facility: HOSPITAL | Age: 54
End: 2024-09-23
Payer: COMMERCIAL

## 2024-09-23 DIAGNOSIS — Z95.1 S/P CABG (CORONARY ARTERY BYPASS GRAFT): Primary | ICD-10-CM

## 2024-09-23 DIAGNOSIS — I71.21 ANEURYSM OF ASCENDING AORTA WITHOUT RUPTURE: Primary | ICD-10-CM

## 2024-09-23 PROCEDURE — 93798 PHYS/QHP OP CAR RHAB W/ECG: CPT

## 2024-09-25 ENCOUNTER — TREATMENT (OUTPATIENT)
Dept: CARDIAC REHAB | Facility: HOSPITAL | Age: 54
End: 2024-09-25
Payer: COMMERCIAL

## 2024-09-25 DIAGNOSIS — Z95.1 S/P CABG (CORONARY ARTERY BYPASS GRAFT): Primary | ICD-10-CM

## 2024-09-25 PROCEDURE — 93798 PHYS/QHP OP CAR RHAB W/ECG: CPT

## 2024-09-27 ENCOUNTER — TREATMENT (OUTPATIENT)
Dept: CARDIAC REHAB | Facility: HOSPITAL | Age: 54
End: 2024-09-27
Payer: COMMERCIAL

## 2024-09-27 DIAGNOSIS — Z95.1 S/P CABG (CORONARY ARTERY BYPASS GRAFT): Primary | ICD-10-CM

## 2024-09-27 PROCEDURE — 93798 PHYS/QHP OP CAR RHAB W/ECG: CPT

## 2024-09-30 ENCOUNTER — TREATMENT (OUTPATIENT)
Dept: CARDIAC REHAB | Facility: HOSPITAL | Age: 54
End: 2024-09-30
Payer: COMMERCIAL

## 2024-09-30 DIAGNOSIS — Z95.1 S/P CABG (CORONARY ARTERY BYPASS GRAFT): Primary | ICD-10-CM

## 2024-09-30 PROCEDURE — 93798 PHYS/QHP OP CAR RHAB W/ECG: CPT

## 2024-10-02 ENCOUNTER — TREATMENT (OUTPATIENT)
Dept: CARDIAC REHAB | Facility: HOSPITAL | Age: 54
End: 2024-10-02
Payer: COMMERCIAL

## 2024-10-02 DIAGNOSIS — Z95.1 S/P CABG (CORONARY ARTERY BYPASS GRAFT): Primary | ICD-10-CM

## 2024-10-02 PROCEDURE — 93798 PHYS/QHP OP CAR RHAB W/ECG: CPT

## 2024-10-04 ENCOUNTER — TREATMENT (OUTPATIENT)
Dept: CARDIAC REHAB | Facility: HOSPITAL | Age: 54
End: 2024-10-04
Payer: COMMERCIAL

## 2024-10-04 DIAGNOSIS — Z95.1 S/P CABG (CORONARY ARTERY BYPASS GRAFT): Primary | ICD-10-CM

## 2024-10-04 PROCEDURE — 93798 PHYS/QHP OP CAR RHAB W/ECG: CPT

## 2024-10-07 ENCOUNTER — TREATMENT (OUTPATIENT)
Dept: CARDIAC REHAB | Facility: HOSPITAL | Age: 54
End: 2024-10-07
Payer: COMMERCIAL

## 2024-10-07 DIAGNOSIS — Z95.1 S/P CABG (CORONARY ARTERY BYPASS GRAFT): Primary | ICD-10-CM

## 2024-10-07 PROCEDURE — 93798 PHYS/QHP OP CAR RHAB W/ECG: CPT

## 2024-10-09 ENCOUNTER — TREATMENT (OUTPATIENT)
Dept: CARDIAC REHAB | Facility: HOSPITAL | Age: 54
End: 2024-10-09
Payer: COMMERCIAL

## 2024-10-09 DIAGNOSIS — Z95.1 S/P CABG (CORONARY ARTERY BYPASS GRAFT): Primary | ICD-10-CM

## 2024-10-09 PROCEDURE — 93798 PHYS/QHP OP CAR RHAB W/ECG: CPT

## 2024-10-11 ENCOUNTER — OFFICE (AMBULATORY)
Age: 54
End: 2024-10-11
Payer: COMMERCIAL

## 2024-10-11 ENCOUNTER — TREATMENT (OUTPATIENT)
Dept: CARDIAC REHAB | Facility: HOSPITAL | Age: 54
End: 2024-10-11
Payer: COMMERCIAL

## 2024-10-11 ENCOUNTER — OFFICE (AMBULATORY)
Dept: URBAN - METROPOLITAN AREA CLINIC 64 | Facility: CLINIC | Age: 54
End: 2024-10-11
Payer: COMMERCIAL

## 2024-10-11 VITALS
WEIGHT: 224 LBS | SYSTOLIC BLOOD PRESSURE: 136 MMHG | DIASTOLIC BLOOD PRESSURE: 71 MMHG | DIASTOLIC BLOOD PRESSURE: 71 MMHG | WEIGHT: 224 LBS | HEIGHT: 69 IN | HEIGHT: 69 IN | HEART RATE: 61 BPM | DIASTOLIC BLOOD PRESSURE: 71 MMHG | SYSTOLIC BLOOD PRESSURE: 136 MMHG | HEIGHT: 69 IN | WEIGHT: 224 LBS | SYSTOLIC BLOOD PRESSURE: 136 MMHG | HEART RATE: 61 BPM | DIASTOLIC BLOOD PRESSURE: 71 MMHG | HEART RATE: 61 BPM | DIASTOLIC BLOOD PRESSURE: 71 MMHG | SYSTOLIC BLOOD PRESSURE: 136 MMHG | HEIGHT: 69 IN | HEART RATE: 61 BPM | HEART RATE: 61 BPM | WEIGHT: 224 LBS | SYSTOLIC BLOOD PRESSURE: 136 MMHG | SYSTOLIC BLOOD PRESSURE: 136 MMHG | WEIGHT: 224 LBS | DIASTOLIC BLOOD PRESSURE: 71 MMHG | DIASTOLIC BLOOD PRESSURE: 71 MMHG | WEIGHT: 224 LBS | HEIGHT: 69 IN | SYSTOLIC BLOOD PRESSURE: 136 MMHG | WEIGHT: 224 LBS | HEART RATE: 61 BPM | HEIGHT: 69 IN | HEART RATE: 61 BPM | HEIGHT: 69 IN

## 2024-10-11 DIAGNOSIS — R13.10 DYSPHAGIA, UNSPECIFIED: ICD-10-CM

## 2024-10-11 DIAGNOSIS — Z95.1 S/P CABG (CORONARY ARTERY BYPASS GRAFT): Primary | ICD-10-CM

## 2024-10-11 PROCEDURE — 99203 OFFICE O/P NEW LOW 30 MIN: CPT | Performed by: INTERNAL MEDICINE

## 2024-10-11 PROCEDURE — 93798 PHYS/QHP OP CAR RHAB W/ECG: CPT

## 2024-10-14 ENCOUNTER — TREATMENT (OUTPATIENT)
Dept: CARDIAC REHAB | Facility: HOSPITAL | Age: 54
End: 2024-10-14
Payer: COMMERCIAL

## 2024-10-14 DIAGNOSIS — Z95.1 S/P CABG (CORONARY ARTERY BYPASS GRAFT): Primary | ICD-10-CM

## 2024-10-14 PROCEDURE — 93798 PHYS/QHP OP CAR RHAB W/ECG: CPT

## 2024-10-16 ENCOUNTER — TREATMENT (OUTPATIENT)
Dept: CARDIAC REHAB | Facility: HOSPITAL | Age: 54
End: 2024-10-16
Payer: COMMERCIAL

## 2024-10-16 DIAGNOSIS — Z95.1 S/P CABG (CORONARY ARTERY BYPASS GRAFT): Primary | ICD-10-CM

## 2024-10-16 PROCEDURE — 93798 PHYS/QHP OP CAR RHAB W/ECG: CPT

## 2024-10-18 ENCOUNTER — TREATMENT (OUTPATIENT)
Dept: CARDIAC REHAB | Facility: HOSPITAL | Age: 54
End: 2024-10-18
Payer: COMMERCIAL

## 2024-10-18 DIAGNOSIS — Z95.1 S/P CABG (CORONARY ARTERY BYPASS GRAFT): Primary | ICD-10-CM

## 2024-10-18 PROCEDURE — 93798 PHYS/QHP OP CAR RHAB W/ECG: CPT

## 2024-10-21 ENCOUNTER — TREATMENT (OUTPATIENT)
Dept: CARDIAC REHAB | Facility: HOSPITAL | Age: 54
End: 2024-10-21
Payer: COMMERCIAL

## 2024-10-21 DIAGNOSIS — Z95.1 S/P CABG (CORONARY ARTERY BYPASS GRAFT): Primary | ICD-10-CM

## 2024-10-21 PROCEDURE — 93798 PHYS/QHP OP CAR RHAB W/ECG: CPT

## 2024-10-25 ENCOUNTER — TREATMENT (OUTPATIENT)
Dept: CARDIAC REHAB | Facility: HOSPITAL | Age: 54
End: 2024-10-25
Payer: COMMERCIAL

## 2024-10-25 DIAGNOSIS — Z95.1 S/P CABG (CORONARY ARTERY BYPASS GRAFT): Primary | ICD-10-CM

## 2024-10-25 PROCEDURE — 93798 PHYS/QHP OP CAR RHAB W/ECG: CPT

## 2024-10-28 ENCOUNTER — TREATMENT (OUTPATIENT)
Dept: CARDIAC REHAB | Facility: HOSPITAL | Age: 54
End: 2024-10-28
Payer: COMMERCIAL

## 2024-10-28 DIAGNOSIS — Z95.1 S/P CABG (CORONARY ARTERY BYPASS GRAFT): Primary | ICD-10-CM

## 2024-10-28 PROCEDURE — 93798 PHYS/QHP OP CAR RHAB W/ECG: CPT

## 2024-10-30 ENCOUNTER — TREATMENT (OUTPATIENT)
Dept: CARDIAC REHAB | Facility: HOSPITAL | Age: 54
End: 2024-10-30
Payer: COMMERCIAL

## 2024-10-30 ENCOUNTER — TELEPHONE (OUTPATIENT)
Dept: CARDIOLOGY | Facility: CLINIC | Age: 54
End: 2024-10-30

## 2024-10-30 DIAGNOSIS — Z95.1 S/P CABG (CORONARY ARTERY BYPASS GRAFT): Primary | ICD-10-CM

## 2024-10-30 PROCEDURE — 93798 PHYS/QHP OP CAR RHAB W/ECG: CPT

## 2024-10-31 ENCOUNTER — ON CAMPUS - OUTPATIENT (AMBULATORY)
Dept: URBAN - METROPOLITAN AREA HOSPITAL 2 | Facility: HOSPITAL | Age: 54
End: 2024-10-31
Payer: COMMERCIAL

## 2024-10-31 ENCOUNTER — OFFICE (AMBULATORY)
Age: 54
End: 2024-10-31
Payer: COMMERCIAL

## 2024-10-31 ENCOUNTER — ON CAMPUS - OUTPATIENT (AMBULATORY)
Age: 54
End: 2024-10-31

## 2024-10-31 ENCOUNTER — OFFICE (AMBULATORY)
Dept: URBAN - METROPOLITAN AREA PATHOLOGY 19 | Facility: PATHOLOGY | Age: 54
End: 2024-10-31
Payer: COMMERCIAL

## 2024-10-31 VITALS
SYSTOLIC BLOOD PRESSURE: 111 MMHG | RESPIRATION RATE: 16 BRPM | DIASTOLIC BLOOD PRESSURE: 86 MMHG | SYSTOLIC BLOOD PRESSURE: 119 MMHG | HEIGHT: 69 IN | RESPIRATION RATE: 18 BRPM | OXYGEN SATURATION: 99 % | SYSTOLIC BLOOD PRESSURE: 111 MMHG | HEART RATE: 64 BPM | HEART RATE: 75 BPM | DIASTOLIC BLOOD PRESSURE: 84 MMHG | DIASTOLIC BLOOD PRESSURE: 73 MMHG | HEART RATE: 65 BPM | TEMPERATURE: 97.1 F | SYSTOLIC BLOOD PRESSURE: 100 MMHG | DIASTOLIC BLOOD PRESSURE: 61 MMHG | SYSTOLIC BLOOD PRESSURE: 114 MMHG | SYSTOLIC BLOOD PRESSURE: 129 MMHG | HEART RATE: 75 BPM | SYSTOLIC BLOOD PRESSURE: 123 MMHG | DIASTOLIC BLOOD PRESSURE: 78 MMHG | SYSTOLIC BLOOD PRESSURE: 129 MMHG | RESPIRATION RATE: 16 BRPM | DIASTOLIC BLOOD PRESSURE: 107 MMHG | RESPIRATION RATE: 18 BRPM | HEIGHT: 69 IN | SYSTOLIC BLOOD PRESSURE: 114 MMHG | OXYGEN SATURATION: 96 % | SYSTOLIC BLOOD PRESSURE: 100 MMHG | WEIGHT: 223 LBS | HEART RATE: 66 BPM | SYSTOLIC BLOOD PRESSURE: 119 MMHG | DIASTOLIC BLOOD PRESSURE: 73 MMHG | HEART RATE: 65 BPM | HEART RATE: 64 BPM | OXYGEN SATURATION: 99 % | DIASTOLIC BLOOD PRESSURE: 84 MMHG | OXYGEN SATURATION: 96 % | DIASTOLIC BLOOD PRESSURE: 107 MMHG | SYSTOLIC BLOOD PRESSURE: 120 MMHG | SYSTOLIC BLOOD PRESSURE: 120 MMHG | HEART RATE: 66 BPM | HEART RATE: 67 BPM | OXYGEN SATURATION: 98 % | SYSTOLIC BLOOD PRESSURE: 123 MMHG | DIASTOLIC BLOOD PRESSURE: 61 MMHG | DIASTOLIC BLOOD PRESSURE: 78 MMHG | OXYGEN SATURATION: 98 % | DIASTOLIC BLOOD PRESSURE: 86 MMHG | TEMPERATURE: 97.1 F | HEART RATE: 67 BPM | WEIGHT: 223 LBS

## 2024-10-31 DIAGNOSIS — R13.10 DYSPHAGIA, UNSPECIFIED: ICD-10-CM

## 2024-10-31 DIAGNOSIS — K44.9 DIAPHRAGMATIC HERNIA WITHOUT OBSTRUCTION OR GANGRENE: ICD-10-CM

## 2024-10-31 DIAGNOSIS — K51.40 INFLAMMATORY POLYPS OF COLON WITHOUT COMPLICATIONS: ICD-10-CM

## 2024-10-31 DIAGNOSIS — K29.50 UNSPECIFIED CHRONIC GASTRITIS WITHOUT BLEEDING: ICD-10-CM

## 2024-10-31 DIAGNOSIS — D12.3 BENIGN NEOPLASM OF TRANSVERSE COLON: ICD-10-CM

## 2024-10-31 DIAGNOSIS — K20.0 EOSINOPHILIC ESOPHAGITIS: ICD-10-CM

## 2024-10-31 DIAGNOSIS — Z12.11 ENCOUNTER FOR SCREENING FOR MALIGNANT NEOPLASM OF COLON: ICD-10-CM

## 2024-10-31 DIAGNOSIS — K63.5 POLYP OF COLON: ICD-10-CM

## 2024-10-31 PROBLEM — K20.80 OTHER ESOPHAGITIS WITHOUT BLEEDING: Status: ACTIVE | Noted: 2024-10-31

## 2024-10-31 PROBLEM — K22.89 OTHER SPECIFIED DISEASE OF ESOPHAGUS: Status: ACTIVE | Noted: 2024-10-31

## 2024-10-31 PROBLEM — K29.70 GASTRITIS, UNSPECIFIED, WITHOUT BLEEDING: Status: ACTIVE | Noted: 2024-10-31

## 2024-10-31 LAB
GI HISTOLOGY: A. STOMACH ANTRUM: (no result)
GI HISTOLOGY: A. STOMACH ANTRUM: (no result)
GI HISTOLOGY: B. MID ESOPHAGUS R/O EOE: (no result)
GI HISTOLOGY: B. MID ESOPHAGUS R/O EOE: (no result)
GI HISTOLOGY: C. TRANSVERSE COLON: (no result)
GI HISTOLOGY: C. TRANSVERSE COLON: (no result)
GI HISTOLOGY: D. CECUM: (no result)
GI HISTOLOGY: D. CECUM: (no result)
GI HISTOLOGY: PDF REPORT: (no result)
GI HISTOLOGY: PDF REPORT: (no result)

## 2024-10-31 PROCEDURE — 45385 COLONOSCOPY W/LESION REMOVAL: CPT | Mod: 33 | Performed by: INTERNAL MEDICINE

## 2024-10-31 PROCEDURE — 43450 DILATE ESOPHAGUS 1/MULT PASS: CPT | Performed by: INTERNAL MEDICINE

## 2024-10-31 PROCEDURE — 43239 EGD BIOPSY SINGLE/MULTIPLE: CPT | Performed by: INTERNAL MEDICINE

## 2024-10-31 PROCEDURE — 88305 TISSUE EXAM BY PATHOLOGIST: CPT | Performed by: PATHOLOGY

## 2024-10-31 RX ORDER — FAMOTIDINE 40 MG/1
TABLET, FILM COATED ORAL
Qty: 90 | Refills: 3 | Status: ACTIVE
Start: 2024-10-31

## 2024-11-01 ENCOUNTER — APPOINTMENT (OUTPATIENT)
Dept: CARDIAC REHAB | Facility: HOSPITAL | Age: 54
End: 2024-11-01
Payer: COMMERCIAL

## 2024-11-04 ENCOUNTER — TREATMENT (OUTPATIENT)
Dept: CARDIAC REHAB | Facility: HOSPITAL | Age: 54
End: 2024-11-04
Payer: COMMERCIAL

## 2024-11-04 DIAGNOSIS — Z95.1 S/P CABG (CORONARY ARTERY BYPASS GRAFT): Primary | ICD-10-CM

## 2024-11-04 PROCEDURE — 93798 PHYS/QHP OP CAR RHAB W/ECG: CPT

## 2024-11-06 ENCOUNTER — TREATMENT (OUTPATIENT)
Dept: CARDIAC REHAB | Facility: HOSPITAL | Age: 54
End: 2024-11-06
Payer: COMMERCIAL

## 2024-11-06 DIAGNOSIS — Z95.1 S/P CABG (CORONARY ARTERY BYPASS GRAFT): Primary | ICD-10-CM

## 2024-11-06 PROCEDURE — 93798 PHYS/QHP OP CAR RHAB W/ECG: CPT

## 2024-11-08 ENCOUNTER — APPOINTMENT (OUTPATIENT)
Dept: CARDIAC REHAB | Facility: HOSPITAL | Age: 54
End: 2024-11-08
Payer: COMMERCIAL

## 2024-11-11 ENCOUNTER — TREATMENT (OUTPATIENT)
Dept: CARDIAC REHAB | Facility: HOSPITAL | Age: 54
End: 2024-11-11
Payer: COMMERCIAL

## 2024-11-11 DIAGNOSIS — Z95.1 S/P CABG (CORONARY ARTERY BYPASS GRAFT): Primary | ICD-10-CM

## 2024-11-11 PROCEDURE — 93798 PHYS/QHP OP CAR RHAB W/ECG: CPT

## 2024-11-13 ENCOUNTER — APPOINTMENT (OUTPATIENT)
Dept: CARDIAC REHAB | Facility: HOSPITAL | Age: 54
End: 2024-11-13
Payer: COMMERCIAL

## 2024-11-15 ENCOUNTER — TREATMENT (OUTPATIENT)
Dept: CARDIAC REHAB | Facility: HOSPITAL | Age: 54
End: 2024-11-15
Payer: COMMERCIAL

## 2024-11-15 DIAGNOSIS — Z95.1 S/P CABG (CORONARY ARTERY BYPASS GRAFT): Primary | ICD-10-CM

## 2024-11-15 PROCEDURE — 93798 PHYS/QHP OP CAR RHAB W/ECG: CPT

## 2024-11-18 ENCOUNTER — APPOINTMENT (OUTPATIENT)
Dept: CARDIAC REHAB | Facility: HOSPITAL | Age: 54
End: 2024-11-18
Payer: COMMERCIAL

## 2024-11-20 ENCOUNTER — APPOINTMENT (OUTPATIENT)
Dept: CARDIAC REHAB | Facility: HOSPITAL | Age: 54
End: 2024-11-20
Payer: COMMERCIAL

## 2024-11-22 ENCOUNTER — APPOINTMENT (OUTPATIENT)
Dept: CARDIAC REHAB | Facility: HOSPITAL | Age: 54
End: 2024-11-22
Payer: COMMERCIAL

## 2024-11-25 ENCOUNTER — APPOINTMENT (OUTPATIENT)
Dept: CARDIAC REHAB | Facility: HOSPITAL | Age: 54
End: 2024-11-25
Payer: COMMERCIAL

## 2024-11-27 ENCOUNTER — APPOINTMENT (OUTPATIENT)
Dept: CARDIAC REHAB | Facility: HOSPITAL | Age: 54
End: 2024-11-27
Payer: COMMERCIAL

## 2024-11-29 ENCOUNTER — APPOINTMENT (OUTPATIENT)
Dept: CARDIAC REHAB | Facility: HOSPITAL | Age: 54
End: 2024-11-29
Payer: COMMERCIAL

## 2024-12-10 ENCOUNTER — OFFICE VISIT (OUTPATIENT)
Dept: CARDIOLOGY | Facility: CLINIC | Age: 54
End: 2024-12-10
Payer: COMMERCIAL

## 2024-12-10 VITALS
HEIGHT: 69 IN | OXYGEN SATURATION: 95 % | DIASTOLIC BLOOD PRESSURE: 80 MMHG | SYSTOLIC BLOOD PRESSURE: 119 MMHG | HEART RATE: 62 BPM | BODY MASS INDEX: 33.98 KG/M2 | WEIGHT: 229.4 LBS

## 2024-12-10 DIAGNOSIS — Z95.1 S/P CABG X 3: ICD-10-CM

## 2024-12-10 DIAGNOSIS — I25.10 CAD, MULTIPLE VESSEL: Primary | ICD-10-CM

## 2024-12-10 DIAGNOSIS — R01.1 HEART MURMUR: ICD-10-CM

## 2024-12-10 DIAGNOSIS — E78.5 HYPERLIPIDEMIA LDL GOAL <100: ICD-10-CM

## 2024-12-10 DIAGNOSIS — I71.21 ANEURYSM OF ASCENDING AORTA WITHOUT RUPTURE: ICD-10-CM

## 2024-12-10 PROCEDURE — 99214 OFFICE O/P EST MOD 30 MIN: CPT | Performed by: INTERNAL MEDICINE

## 2024-12-10 NOTE — PROGRESS NOTES
"Cardiology Office Visit      Encounter Date:  12/10/2024    Patient ID:   Heladio Clark is a 54 y.o. male.    Reason For Followup:  Coronary artery disease    Brief Clinical History:  Dear Dilcia Adam APRN    I had the pleasure of seeing Heladio Clark today. As you are well aware, this is a 54 y.o. male with a recent diagnosis of coronary artery disease status post coronary artery bypass surgery here for follow-up    Interval History:  Denies any new cardiac symptoms  Clinically feeling better  Postsurgical pain is much improved  Denies any new exertional cardiac symptoms    Assessment & Plan    Impressions:    Coronary disease status post CABG x 3  Ascending aortic aneurysm measuring 4.2 cm  Postop atrial fibrillation currently in sinus rhythm   Low Arian vascular score  Normal LV systolic function  Hyperlipidemia  Obstructive sleep apnea  CABG x 3 (LIMA to LAD, KIMO to Diagonal, KIMO to OM) off pump.   Impaired fasting glucose       Recommendations:  Prior workup reviewed and discussed with patient  Cardiac cath findings hospitalization medical records reviewed and discussed with patient  Continued aggressive risk factor modification  Continue current medical therapy  Finish current prescription for amiodarone and discontinue amiodarone therapy after finishing the current prescription  Schedule l for cardiac rehab  Follow-up with surgery as scheduled  Continue current medical therapy with aspirin 81 mg p.o. once a day Lipitor 40 mg p.o. once a day metoprolol 25 mg p.o. twice daily Jardiance 10 mg p.o. once a day   Prior workup labs and medications reviewed and discussed with patient  Check labs including CBC CMP lipids and thyroid profile  Patient is scheduled for follow-up CT chest to follow-up on the size of the ascending aortic aneurysm  Follow-up in office in 6 months        Vitals:  Vitals:    12/10/24 1415   BP: 119/80   Pulse: 62   SpO2: 95%   Weight: 104 kg (229 lb 6.4 oz)   Height: 175.3 cm (69\") "       Physical Exam:    General: Alert, cooperative, no distress, appears stated age  Head:  Normocephalic, atraumatic, mucous membranes moist  Eyes:  Conjunctiva/corneas clear, EOM's intact     Neck:  Supple,  no adenopathy;      Lungs: Clear to auscultation bilaterally, no wheezes rhonchi rales are noted  Chest wall: No tenderness  Heart::  Regular rate and rhythm, S1 and S2 normal, no murmur, rub or gallop  Abdomen: Soft, non-tender, nondistended bowel sounds active  Extremities: No cyanosis, clubbing, or edema  Pulses: 2+ and symmetric all extremities  Skin:  No rashes or lesions  Neuro/psych: A&O x3. CN II through XII are grossly intact with appropriate affect              Lab Results   Component Value Date    GLUCOSE 163 (H) 08/13/2024    BUN 20 08/13/2024    CREATININE 1.05 08/13/2024    EGFR 84.9 08/13/2024    BCR 19.0 08/13/2024    K 3.6 08/13/2024    CO2 25.8 08/13/2024    CALCIUM 8.9 08/13/2024    ALBUMIN 3.7 08/09/2024    BILITOT 0.4 08/05/2024    AST 16 08/05/2024    ALT 22 08/05/2024     Results for orders placed in visit on 08/08/24    Intra-Op Anesthesia JELANI    Narrative  Intra-Op Anesthesia JELANI    Procedure Performed: Intra-Op Anesthesia JELANI  Start Time:  End Time:    Preanesthesia Checklist:  Patient identified, IV assessed, risks and benefits discussed, monitors and equipment assessed, procedure being performed at surgeon's request and anesthesia consent obtained.    General Procedure Information  Diagnostic Indications for Echo:  assessment of surgical repair and hemodynamic monitoring  Location performed:  OR  Intubated  Bite block placed  Heart visualized  Probe Insertion:  Easy  Probe Type:  Biplane and multiplane  Modalities:  Color flow mapping, pulse wave Doppler and continuous wave Doppler    Echocardiographic and Doppler Measurements    Ventricles    Right Ventricle:  Cavity size normal.  Hypertrophy not present.  Thrombus not present.  Global function normal.  Ejection Fraction  Sabrina MATA 60%.  Left Ventricle:  Cavity size normal.  Thrombus not present.  Global Function normal.  Ejection Fraction 60%.    Ventricular Regional Function:  1- Basal Anteroseptal:  normal  2- Basal Anterior:  normal  3- Basal Anterolateral:  normal  4- Basal Inferolateral:  normal  5- Basal Inferior:  normal  6- Basal Inferoseptal:  normal  7- Mid Anteroseptal:  normal  8- Mid Anterior:  normal  9- Mid Anterolateral:  normal  10- Mid Inferolateral:  normal  11- Mid Inferior:  normal  12- Mid Inferoseptal:  normal  13- Apical Anterior:  normal  14- Apical Lateral:  normal  15- Apical Inferior:  normal  16- Apical Septal:  normal  17- [place]:  normal      Valves    Aortic Valve:  Annulus normal.  Stenosis not present.  Regurgitation mild.  Leaflets normal.  Leaflet motions restricted.    Mitral Valve:  Annulus normal.  Stenosis not present.  Regurgitation absent.  Leaflets normal.  Leaflet motions normal.    Tricuspid Valve:  Annulus normal.  Stenosis not present.  Regurgitation absent.  Leaflets normal.  Leaflet motions normal.  Pulmonic Valve:  Annulus normal.  Stenosis not present.  Regurgitation absent.      Aorta    Ascending Aorta:  Size dilated.  Diameter 4.2 cm.  Dissection not present.  Plaque thickness less than 3 mm.  Mobile plaque not present.  Aortic Arch:  Size normal.  Dissection not present.  Plaque thickness less than 3 mm.  Mobile plaque not present.  Descending Aorta:  Size normal.  Dissection not present.  Plaque thickness less than 3 mm.  Mobile plaque not present.      Atria    Right Atrium:  Size normal.  Left Atrium:  Size normal.  Spontaneous echo contrast not present.  Thrombus not present.  Tumor not present.  Device not present.  Left atrial appendage normal.      Septa        Ventricular Septum:  Intra-ventricular septum morphology normal.        Other Findings  Pleural Effusion:  none  Pulmonary Arteries:  normal      Anesthesia Information  Performed Personally  Anesthesiologist:  Nikole  MD Mitchel      Echocardiogram Comments:  JELANI placed without difficulty , lubricated , bite guard      Pre CABG JELANI  LV no WMA EF 60  RV nl SF  MV wnl  AV Mild AI, effacement of STJ asc aorta 4.2cm , nl caliber arch  TV trace Tr      S/p cabg  No change     Results for orders placed during the hospital encounter of 08/02/24    Cardiac Catheterization/Vascular Study    Conclusion  Table formatting from the original result was not included.  Cardiac Catheterization Operative Report    Heladio Clark  [id number]  8/2/2024  @PCP@    He underwent cardiac catheterization.    Indications for the procedure include: abnormal stress test.    Results for orders placed during the hospital encounter of 07/11/24    Adult Transthoracic Echo Complete W/ Cont if Necessary Per Protocol    Interpretation Summary    Left ventricular systolic function is normal. Calculated left ventricular EF = 59% Left ventricular ejection fraction appears to be 56 - 60%.    Left ventricular wall thickness is consistent with mild concentric hypertrophy.    Left ventricular diastolic function was normal.    Estimated right ventricular systolic pressure from tricuspid regurgitation is normal (<35 mmHg).    Moderate dilation of the ascending aorta is present.    Ascending aortic aneurysm measuring 4.1 cm    Results for orders placed during the hospital encounter of 07/11/24    Stress Test With Myocardial Perfusion One Day    Interpretation Summary    Exercise myocardial perfusion study with a hypertensive response and also patient noted to have a somewhat nondiagnostic EKG changes with upsloping ST segment depression with exercise.  Myocardial perfusion study shows small apical perfusion defect at rest at peak stress after the exercise there is small to moderate size severe intensity perfusion defect with  small reversible ischemia.    Left ventricular ejection fraction is normal (Calculated EF = 59%).    Findings consistent with an indeterminate ECG stress  test.    Impressions are consistent with an intermediate risk study.    Clinical correlation is recommended        Procedure Details:  The risks, benefits, complications, treatment options, and expected outcomes were discussed with the patient. The patient and/or family concurred with the proposed plan, giving informed consent.    After informed consent the patient was brought to the cath lab after appropriate IV hydration was begun and oral premedication was given. He was further sedated with fentanyl. He was prepped and draped in the usual manner. Using the modified Seldinger access technique, a 6 Maori sheath was placed in the right radial  artery. A left heart catheterization with coronary arteriography was performed. Findings are discussed below.    After the procedure was completed, sedation was stopped and the sheaths and catheters were all removed. Hemostasis was achieved per established hospital protocols.    Conscious sedation:  Conscious sedation was performed according to protocol.  I supervised and directed an independent trained observer with the assistance of monitoring the patient's level of consciousness and physiologic status throughout the procedure.  Intraoperative service time was 60 minutes.    Findings:    Hemodynamics Central artery pressure systolic 104 diastolic 72 with a mean pressure of 86 mmHg  LV end-diastolic pressure of 19 mmHg  There was no gradient across the aortic valve on the pullback of the pigtail catheter  Left Main Large-caliber vessel angiographically bifurcates into left anterior descending and left circumflex arteries  RCA Nondominant small caliber vessel  Right coronary artery is a nondominant vessel provides only RV marginal branches  Right coronary artery has a mid long segment angiographic 90% stenosis  LAD Large-caliber vessel  LAD has proximal angiographic 20 to 30% stenosis  Mid LAD after the second diagonal branch has angiographic area of calcified 99% stenosis  and this lesion involves the ostium of the second diagonal branch.  Mid LAD right after the lesion has a long segment intramyocardial bridging  First diagonal branch of the LAD is a moderate caliber vessel has a mid focal area of angiographic 80% stenosis  Second diagonal branch of the LAD is a moderate caliber vessel with ostial angiographic 70% stenosis and mid long segment angiographic 70% stenosis  Circ Large-caliber vessel dominant vessel  Left circumflex artery has a mid focal area of angiographic 30% stenosis  First marginal branch of left circumflex artery is a moderate caliber vessel has ostial angiographic 30 to 40% stenosis  Second marginal branch of left circumflex artery is a large-caliber vessel has a long segment proximal mid angiographic 90% stenosis  Third marginal branch of the left circumflex artery is a left PDA system angiographically normal  Fourth marginal branch of left circumflex artery works as left PLB system angiographically normal  LV Normal LV systolic function normal wall motion estimate LV ejection fraction of 60%  Coronary Dominance Left circumflex artery    Estimated Blood Loss:  Minimal    Specimens: None    Complications:  None; patient tolerated the procedure well.    Disposition: PACU - hemodynamically stable.    Condition: stable    Impressions:  Severe multivessel coronary artery disease  Severe stenosis involving the mid LAD after the second diagonal branch involving the ostium of the diagonal branch  Severe stenosis involving a moderate-sized first diagonal branch and moderate-sized second diagonal branch  Severe stenosis involving the marginal branch of left circumflex artery  Severe stenosis involving the nondominant right coronary artery  Normal LV systolic function normal wall motion    Recommendations:  Options for PCI versus surgical revascularization reviewed and discussed with patient and family     Lab Results   Component Value Date    CHOL 152 08/05/2024    TRIG  160 (H) 08/05/2024    HDL 35 (L) 08/05/2024    LDL 89 08/05/2024      Results for orders placed during the hospital encounter of 07/11/24    Stress Test With Myocardial Perfusion One Day    Interpretation Summary    Exercise myocardial perfusion study with a hypertensive response and also patient noted to have a somewhat nondiagnostic EKG changes with upsloping ST segment depression with exercise.  Myocardial perfusion study shows small apical perfusion defect at rest at peak stress after the exercise there is small to moderate size severe intensity perfusion defect with  small reversible ischemia.    Left ventricular ejection fraction is normal (Calculated EF = 59%).    Findings consistent with an indeterminate ECG stress test.    Impressions are consistent with an intermediate risk study.    Clinical correlation is recommended            Objective:          Allergies:  Allergies   Allergen Reactions    Nuts Anaphylaxis    Penicillins Hives       Medication Review:     Current Outpatient Medications:     aspirin 81 MG EC tablet, Take 1 tablet by mouth Daily., Disp: , Rfl:     atorvastatin (LIPITOR) 40 MG tablet, Take 1 tablet by mouth Daily., Disp: 90 tablet, Rfl: 3    empagliflozin (JARDIANCE) 10 MG tablet tablet, Take 1 tablet by mouth Daily., Disp: 90 tablet, Rfl: 3    metoprolol tartrate (LOPRESSOR) 25 MG tablet, Take 1 tablet by mouth Every 12 (Twelve) Hours., Disp: 180 tablet, Rfl: 3    nitroglycerin (NITROSTAT) 0.4 MG SL tablet, 1 under the tongue as needed for angina, may repeat q5mins for up three doses, Disp: 25 tablet, Rfl: 2    Family History:  Family History   Problem Relation Age of Onset    Heart disease Father        Past Medical History:  Past Medical History:   Diagnosis Date    Coronary artery disease     Hyperlipidemia        Past surgical History:  Past Surgical History:   Procedure Laterality Date    CARDIAC CATHETERIZATION N/A 8/2/2024    Procedure: Left Heart Cath possible PCI;  Surgeon:  Wilmer John MD;  Location: [facility] CATH INVASIVE LOCATION;  Service: Cardiovascular;  Laterality: N/A;    CORONARY ARTERY BYPASS GRAFT N/A 8/8/2024    Procedure: CORONARY ARTERY BYPASS GRAFTING;  Surgeon: Salvatore Domingo MD;  Location: [facility] CVOR;  Service: Cardiothoracic;  Laterality: N/A;  Off Pump CABG x 3 (KIMO T-grafted to LIMA, LIMA sequenced)    TONSILLECTOMY         Social History:  Social History     Socioeconomic History    Marital status:    Tobacco Use    Smoking status: Never    Smokeless tobacco: Never   Vaping Use    Vaping status: Never Used   Substance and Sexual Activity    Alcohol use: Yes     Comment: socially    Drug use: Never    Sexual activity: Defer       Review of Systems:  The following systems were reviewed as they relate to the cardiovascular system: Constitutional, Eyes, ENT, Cardiovascular, Respiratory, Gastrointestinal, Integumentary, Neurological, Psychiatric, Hematologic, Endocrine, Musculoskeletal, and Genitourinary. The pertinent cardiovascular findings are reported above with all other cardiovascular points within those systems being negative.    Diagnostic Study Review:     Current Electrocardiogram:  Procedures          NOTE: The following portions of the patient's history were reviewed and updated this visit as appropriate: allergies, current medications, past family history, past medical history, past social history, past surgical history and problem list.

## 2025-02-03 NOTE — Clinical Note
The physician has confirmed that the patient has been reassessed and is appropriate for moderate sedation Detail Level: Detailed Depth Of Biopsy: dermis Was A Bandage Applied: Yes Size Of Lesion In Cm: 0.6 X Size Of Lesion In Cm: 0 Biopsy Type: H and E Biopsy Method: Dermablade Anesthesia Type: 1% lidocaine with epinephrine Anesthesia Volume In Cc: 0.5 Hemostasis: Drysol Wound Care: Petrolatum Dressing: bandage Destruction After The Procedure: No Type Of Destruction Used: Curettage Curettage Text: The wound bed was treated with curettage after the biopsy was performed. Cryotherapy Text: The wound bed was treated with cryotherapy after the biopsy was performed. Electrodesiccation Text: The wound bed was treated with electrodesiccation after the biopsy was performed. Electrodesiccation And Curettage Text: The wound bed was treated with electrodesiccation and curettage after the biopsy was performed. Silver Nitrate Text: The wound bed was treated with silver nitrate after the biopsy was performed. Lab: [id number] Lab Facility: [id number] Medical Necessity Information: It is in your best interest to select a reason for this procedure from the list below. All of these items fulfill various CMS LCD requirements except the new and changing color options. Consent: Written consent was obtained and risks were reviewed including but not limited to scarring, infection, bleeding, scabbing, incomplete removal, nerve damage and allergy to anesthesia. Post-Care Instructions: I reviewed with the patient in detail post-care instructions. Patient is to keep the biopsy site dry overnight, and then apply bacitracin twice daily until healed. Patient may apply hydrogen peroxide soaks to remove any crusting. Notification Instructions: Patient will be notified of biopsy results. However, patient instructed to call the office if not contacted within 2 weeks. Billing Type: Third-Party Bill Information: Selecting Yes will display possible errors in your note based on the variables you have selected. This validation is only offered as a suggestion for you. PLEASE NOTE THAT THE VALIDATION TEXT WILL BE REMOVED WHEN YOU FINALIZE YOUR NOTE. IF YOU WANT TO FAX A PRELIMINARY NOTE YOU WILL NEED TO TOGGLE THIS TO 'NO' IF YOU DO NOT WANT IT IN YOUR FAXED NOTE.

## 2025-02-27 ENCOUNTER — TELEPHONE (OUTPATIENT)
Dept: CARDIAC SURGERY | Facility: CLINIC | Age: 55
End: 2025-02-27
Payer: COMMERCIAL

## 2025-02-27 NOTE — TELEPHONE ENCOUNTER
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 Fax report under media tab.   
pt walking with steady gait in ED
no

## 2025-06-10 ENCOUNTER — OFFICE VISIT (OUTPATIENT)
Dept: CARDIOLOGY | Facility: CLINIC | Age: 55
End: 2025-06-10
Payer: COMMERCIAL

## 2025-06-10 VITALS
WEIGHT: 236.6 LBS | HEART RATE: 70 BPM | BODY MASS INDEX: 35.04 KG/M2 | HEIGHT: 69 IN | DIASTOLIC BLOOD PRESSURE: 86 MMHG | SYSTOLIC BLOOD PRESSURE: 136 MMHG | OXYGEN SATURATION: 96 %

## 2025-06-10 DIAGNOSIS — I25.118 CORONARY ARTERY DISEASE OF NATIVE ARTERY OF NATIVE HEART WITH STABLE ANGINA PECTORIS: Primary | ICD-10-CM

## 2025-06-10 DIAGNOSIS — E78.2 MIXED HYPERLIPIDEMIA: ICD-10-CM

## 2025-06-10 DIAGNOSIS — I25.10 CORONARY ARTERY DISEASE INVOLVING NATIVE CORONARY ARTERY OF NATIVE HEART WITHOUT ANGINA PECTORIS: ICD-10-CM

## 2025-06-10 DIAGNOSIS — E78.5 HYPERLIPIDEMIA LDL GOAL <100: ICD-10-CM

## 2025-06-10 DIAGNOSIS — I71.21 ANEURYSM OF ASCENDING AORTA WITHOUT RUPTURE: ICD-10-CM

## 2025-06-10 DIAGNOSIS — Z95.1 S/P CABG X 3: ICD-10-CM

## 2025-06-10 DIAGNOSIS — R94.39 ABNORMAL NUCLEAR STRESS TEST: ICD-10-CM

## 2025-06-10 NOTE — PROGRESS NOTES
Cardiology Office Visit      Encounter Date:  06/10/2025    Patient ID:   Heladio Clark is a 55 y.o. male.    Reason For Followup:  Coronary artery disease    Brief Clinical History:  Dear Dilcia Adam APRN    I had the pleasure of seeing Heladio Clark today. As you are well aware, this is a 55 y.o. male with a recent diagnosis of coronary artery disease status post coronary artery bypass surgery here for follow-up    Interval History:  Denies any new cardiac symptoms  Clinically feeling better  Postsurgical pain is much improved  Denies any new exertional cardiac symptoms    Assessment & Plan    Impressions:    Coronary disease status post CABG x 3  Ascending aortic aneurysm measuring 4.2 cm  Postop atrial fibrillation currently in sinus rhythm   Low Arian vascular score  Normal LV systolic function  Hyperlipidemia  Obstructive sleep apnea  CABG x 3 (LIMA to LAD, KIMO to Diagonal, KIMO to OM) off pump.   Impaired fasting glucose       Recommendations:  Prior workup reviewed and discussed with patient  Cardiac cath findings hospitalization medical records reviewed and discussed with patient  Continued aggressive risk factor modification  Continue current medical therapy  Finish current prescription for amiodarone and discontinue amiodarone therapy after finishing the current prescription  Schedule l for cardiac rehab  Follow-up with surgery as scheduled  Continue current medical therapy with aspirin 81 mg p.o. once a day Lipitor 40 mg p.o. once a day metoprolol 25 mg p.o. twice daily Jardiance 10 mg p.o. once a day   Prior workup labs and medications reviewed and discussed with patient  Check labs including CBC CMP lipids and thyroid profile  Patient is scheduled for follow-up CT chest to follow-up on the size of the ascending aortic aneurysm  Regular exercise and weight loss reviewed and discussed with patient  Follow-up in office in 6 months        Vitals:  Vitals:    06/10/25 1335   BP: 136/86   BP  "Location: Left arm   Patient Position: Sitting   Cuff Size: Adult   Pulse: 70   SpO2: 96%   Weight: 107 kg (236 lb 9.6 oz)   Height: 175.3 cm (69\")       Physical Exam:    General: Alert, cooperative, no distress, appears stated age  Head:  Normocephalic, atraumatic, mucous membranes moist  Eyes:  Conjunctiva/corneas clear, EOM's intact     Neck:  Supple,  no adenopathy;      Lungs: Clear to auscultation bilaterally, no wheezes rhonchi rales are noted  Chest wall: No tenderness  Heart::  Regular rate and rhythm, S1 and S2 normal, no murmur, rub or gallop  Abdomen: Soft, non-tender, nondistended bowel sounds active  Extremities: No cyanosis, clubbing, or edema  Pulses: 2+ and symmetric all extremities  Skin:  No rashes or lesions  Neuro/psych: A&O x3. CN II through XII are grossly intact with appropriate affect              Lab Results   Component Value Date    GLUCOSE 163 (H) 08/13/2024    BUN 20 08/13/2024    CREATININE 1.05 08/13/2024    EGFR 84.9 08/13/2024    BCR 19.0 08/13/2024    K 3.6 08/13/2024    CO2 25.8 08/13/2024    CALCIUM 8.9 08/13/2024    ALBUMIN 3.7 08/09/2024    BILITOT 0.4 08/05/2024    AST 16 08/05/2024    ALT 22 08/05/2024     Results for orders placed in visit on 08/08/24    Intra-Op Anesthesia JELANI    Narrative  Intra-Op Anesthesia JELANI    Procedure Performed: Intra-Op Anesthesia JELANI  Start Time:  End Time:    Preanesthesia Checklist:  Patient identified, IV assessed, risks and benefits discussed, monitors and equipment assessed, procedure being performed at surgeon's request and anesthesia consent obtained.    General Procedure Information  Diagnostic Indications for Echo:  assessment of surgical repair and hemodynamic monitoring  Location performed:  OR  Intubated  Bite block placed  Heart visualized  Probe Insertion:  Easy  Probe Type:  Biplane and multiplane  Modalities:  Color flow mapping, pulse wave Doppler and continuous wave Doppler    Echocardiographic and Doppler " Measurements    Ventricles    Right Ventricle:  Cavity size normal.  Hypertrophy not present.  Thrombus not present.  Global function normal.  Ejection Fraction 60%.  Left Ventricle:  Cavity size normal.  Thrombus not present.  Global Function normal.  Ejection Fraction 60%.    Ventricular Regional Function:  1- Basal Anteroseptal:  normal  2- Basal Anterior:  normal  3- Basal Anterolateral:  normal  4- Basal Inferolateral:  normal  5- Basal Inferior:  normal  6- Basal Inferoseptal:  normal  7- Mid Anteroseptal:  normal  8- Mid Anterior:  normal  9- Mid Anterolateral:  normal  10- Mid Inferolateral:  normal  11- Mid Inferior:  normal  12- Mid Inferoseptal:  normal  13- Apical Anterior:  normal  14- Apical Lateral:  normal  15- Apical Inferior:  normal  16- Apical Septal:  normal  17- [place]:  normal      Valves    Aortic Valve:  Annulus normal.  Stenosis not present.  Regurgitation mild.  Leaflets normal.  Leaflet motions restricted.    Mitral Valve:  Annulus normal.  Stenosis not present.  Regurgitation absent.  Leaflets normal.  Leaflet motions normal.    Tricuspid Valve:  Annulus normal.  Stenosis not present.  Regurgitation absent.  Leaflets normal.  Leaflet motions normal.  Pulmonic Valve:  Annulus normal.  Stenosis not present.  Regurgitation absent.      Aorta    Ascending Aorta:  Size dilated.  Diameter 4.2 cm.  Dissection not present.  Plaque thickness less than 3 mm.  Mobile plaque not present.  Aortic Arch:  Size normal.  Dissection not present.  Plaque thickness less than 3 mm.  Mobile plaque not present.  Descending Aorta:  Size normal.  Dissection not present.  Plaque thickness less than 3 mm.  Mobile plaque not present.      Atria    Right Atrium:  Size normal.  Left Atrium:  Size normal.  Spontaneous echo contrast not present.  Thrombus not present.  Tumor not present.  Device not present.  Left atrial appendage normal.      Septa        Ventricular Septum:  Intra-ventricular septum morphology  normal.        Other Findings  Pleural Effusion:  none  Pulmonary Arteries:  normal      Anesthesia Information  Performed Personally  Anesthesiologist:  Mitchel Agustin MD      Echocardiogram Comments:  JELANI placed without difficulty , lubricated , bite guard      Pre CABG JELANI  LV no WMA EF 60  RV nl SF  MV wnl  AV Mild AI, effacement of STJ asc aorta 4.2cm , nl caliber arch  TV trace Tr      S/p cabg  No change     Results for orders placed during the hospital encounter of 08/02/24    Cardiac Catheterization/Vascular Study    Conclusion  Table formatting from the original result was not included.  Cardiac Catheterization Operative Report    Heladio Clark  [id number]  8/2/2024  @PCP@    He underwent cardiac catheterization.    Indications for the procedure include: abnormal stress test.    Results for orders placed during the hospital encounter of 07/11/24    Adult Transthoracic Echo Complete W/ Cont if Necessary Per Protocol    Interpretation Summary    Left ventricular systolic function is normal. Calculated left ventricular EF = 59% Left ventricular ejection fraction appears to be 56 - 60%.    Left ventricular wall thickness is consistent with mild concentric hypertrophy.    Left ventricular diastolic function was normal.    Estimated right ventricular systolic pressure from tricuspid regurgitation is normal (<35 mmHg).    Moderate dilation of the ascending aorta is present.    Ascending aortic aneurysm measuring 4.1 cm    Results for orders placed during the hospital encounter of 07/11/24    Stress Test With Myocardial Perfusion One Day    Interpretation Summary    Exercise myocardial perfusion study with a hypertensive response and also patient noted to have a somewhat nondiagnostic EKG changes with upsloping ST segment depression with exercise.  Myocardial perfusion study shows small apical perfusion defect at rest at peak stress after the exercise there is small to moderate size severe intensity perfusion  defect with  small reversible ischemia.    Left ventricular ejection fraction is normal (Calculated EF = 59%).    Findings consistent with an indeterminate ECG stress test.    Impressions are consistent with an intermediate risk study.    Clinical correlation is recommended        Procedure Details:  The risks, benefits, complications, treatment options, and expected outcomes were discussed with the patient. The patient and/or family concurred with the proposed plan, giving informed consent.    After informed consent the patient was brought to the cath lab after appropriate IV hydration was begun and oral premedication was given. He was further sedated with fentanyl. He was prepped and draped in the usual manner. Using the modified Seldinger access technique, a 6 [place] sheath was placed in the right radial  artery. A left heart catheterization with coronary arteriography was performed. Findings are discussed below.    After the procedure was completed, sedation was stopped and the sheaths and catheters were all removed. Hemostasis was achieved per established hospital protocols.    Conscious sedation:  Conscious sedation was performed according to protocol.  I supervised and directed an independent trained observer with the assistance of monitoring the patient's level of consciousness and physiologic status throughout the procedure.  Intraoperative service time was 60 minutes.    Findings:    Hemodynamics Central artery pressure systolic 104 diastolic 72 with a mean pressure of 86 mmHg  LV end-diastolic pressure of 19 mmHg  There was no gradient across the aortic valve on the pullback of the pigtail catheter  Left Main Large-caliber vessel angiographically bifurcates into left anterior descending and left circumflex arteries  RCA Nondominant small caliber vessel  Right coronary artery is a nondominant vessel provides only RV marginal branches  Right coronary artery has a mid long segment angiographic 90%  stenosis  LAD Large-caliber vessel  LAD has proximal angiographic 20 to 30% stenosis  Mid LAD after the second diagonal branch has angiographic area of calcified 99% stenosis and this lesion involves the ostium of the second diagonal branch.  Mid LAD right after the lesion has a long segment intramyocardial bridging  First diagonal branch of the LAD is a moderate caliber vessel has a mid focal area of angiographic 80% stenosis  Second diagonal branch of the LAD is a moderate caliber vessel with ostial angiographic 70% stenosis and mid long segment angiographic 70% stenosis  Circ Large-caliber vessel dominant vessel  Left circumflex artery has a mid focal area of angiographic 30% stenosis  First marginal branch of left circumflex artery is a moderate caliber vessel has ostial angiographic 30 to 40% stenosis  Second marginal branch of left circumflex artery is a large-caliber vessel has a long segment proximal mid angiographic 90% stenosis  Third marginal branch of the left circumflex artery is a left PDA system angiographically normal  Fourth marginal branch of left circumflex artery works as left PLB system angiographically normal  LV Normal LV systolic function normal wall motion estimate LV ejection fraction of 60%  Coronary Dominance Left circumflex artery    Estimated Blood Loss:  Minimal    Specimens: None    Complications:  None; patient tolerated the procedure well.    Disposition: PACU - hemodynamically stable.    Condition: stable    Impressions:  Severe multivessel coronary artery disease  Severe stenosis involving the mid LAD after the second diagonal branch involving the ostium of the diagonal branch  Severe stenosis involving a moderate-sized first diagonal branch and moderate-sized second diagonal branch  Severe stenosis involving the marginal branch of left circumflex artery  Severe stenosis involving the nondominant right coronary artery  Normal LV systolic function normal wall  motion    Recommendations:  Options for PCI versus surgical revascularization reviewed and discussed with patient and family     Lab Results   Component Value Date    CHOL 152 08/05/2024    TRIG 160 (H) 08/05/2024    HDL 35 (L) 08/05/2024    LDL 89 08/05/2024      Results for orders placed during the hospital encounter of 07/11/24    Stress Test With Myocardial Perfusion One Day    Interpretation Summary    Exercise myocardial perfusion study with a hypertensive response and also patient noted to have a somewhat nondiagnostic EKG changes with upsloping ST segment depression with exercise.  Myocardial perfusion study shows small apical perfusion defect at rest at peak stress after the exercise there is small to moderate size severe intensity perfusion defect with  small reversible ischemia.    Left ventricular ejection fraction is normal (Calculated EF = 59%).    Findings consistent with an indeterminate ECG stress test.    Impressions are consistent with an intermediate risk study.    Clinical correlation is recommended            Objective:          Allergies:  Allergies   Allergen Reactions    Nuts Anaphylaxis    Penicillins Hives       Medication Review:     Current Outpatient Medications:     aspirin 81 MG EC tablet, Take 1 tablet by mouth Daily., Disp: , Rfl:     atorvastatin (LIPITOR) 40 MG tablet, Take 1 tablet by mouth Daily., Disp: 90 tablet, Rfl: 3    empagliflozin (JARDIANCE) 10 MG tablet tablet, Take 1 tablet by mouth Daily., Disp: 90 tablet, Rfl: 3    metoprolol tartrate (LOPRESSOR) 25 MG tablet, Take 1 tablet by mouth Every 12 (Twelve) Hours. (Patient taking differently: Take 1 tablet by mouth Daily.), Disp: 180 tablet, Rfl: 3    nitroglycerin (NITROSTAT) 0.4 MG SL tablet, 1 under the tongue as needed for angina, may repeat q5mins for up three doses, Disp: 25 tablet, Rfl: 2    Family History:  Family History   Problem Relation Age of Onset    Heart disease Father        Past Medical History:  Past  Medical History:   Diagnosis Date    Coronary artery disease     Hyperlipidemia        Past surgical History:  Past Surgical History:   Procedure Laterality Date    CARDIAC CATHETERIZATION N/A 8/2/2024    Procedure: Left Heart Cath possible PCI;  Surgeon: Wilmer John MD;  Location: [facility] CATH INVASIVE LOCATION;  Service: Cardiovascular;  Laterality: N/A;    CORONARY ARTERY BYPASS GRAFT N/A 8/8/2024    Procedure: CORONARY ARTERY BYPASS GRAFTING;  Surgeon: Salvatore Domingo MD;  Location: [facility] CVOR;  Service: Cardiothoracic;  Laterality: N/A;  Off Pump CABG x 3 (KIMO T-grafted to LIMA, LIMA sequenced)    TONSILLECTOMY         Social History:  Social History     Socioeconomic History    Marital status:    Tobacco Use    Smoking status: Never    Smokeless tobacco: Never   Vaping Use    Vaping status: Never Used   Substance and Sexual Activity    Alcohol use: Yes     Comment: socially    Drug use: Never    Sexual activity: Defer       Review of Systems:  The following systems were reviewed as they relate to the cardiovascular system: Constitutional, Eyes, ENT, Cardiovascular, Respiratory, Gastrointestinal, Integumentary, Neurological, Psychiatric, Hematologic, Endocrine, Musculoskeletal, and Genitourinary. The pertinent cardiovascular findings are reported above with all other cardiovascular points within those systems being negative.    Diagnostic Study Review:     Current Electrocardiogram:  Procedures          NOTE: The following portions of the patient's history were reviewed and updated this visit as appropriate: allergies, current medications, past family history, past medical history, past social history, past surgical history and problem list.

## 2025-06-12 ENCOUNTER — OFFICE VISIT (OUTPATIENT)
Dept: ORTHOPEDIC SURGERY | Facility: CLINIC | Age: 55
End: 2025-06-12
Payer: COMMERCIAL

## 2025-06-12 VITALS — HEIGHT: 69 IN | BODY MASS INDEX: 34.63 KG/M2 | WEIGHT: 233.8 LBS | HEART RATE: 63 BPM

## 2025-06-12 DIAGNOSIS — M25.511 ACUTE PAIN OF RIGHT SHOULDER: Primary | ICD-10-CM

## 2025-06-12 DIAGNOSIS — M75.01 ADHESIVE CAPSULITIS OF RIGHT SHOULDER: ICD-10-CM

## 2025-06-12 RX ORDER — TRIAMCINOLONE ACETONIDE 40 MG/ML
80 INJECTION, SUSPENSION INTRA-ARTICULAR; INTRAMUSCULAR ONCE
Status: COMPLETED | OUTPATIENT
Start: 2025-06-12 | End: 2025-06-12

## 2025-06-12 RX ADMIN — TRIAMCINOLONE ACETONIDE 80 MG: 40 INJECTION, SUSPENSION INTRA-ARTICULAR; INTRAMUSCULAR at 15:35

## 2025-06-12 NOTE — PROGRESS NOTES
"     Patient ID: Heladio Clark is a 55 y.o. male.    Chief Complaint:    Chief Complaint   Patient presents with    Right Shoulder - Pain, Initial Evaluation     PAIN 5        HPI:  This is a 55-year-old gentleman here send Dilcia Spaulding with several months of right shoulder pain he had coronary artery bypass surgery in August of last year pain has progressed since that time.  He has loss of motion especially overhead and behind his back pain is over the anterior aspect of the shoulder.  No prior shoulder surgery.  Has therapy scheduled to start next week  Past Medical History:   Diagnosis Date    Coronary artery disease     Hyperlipidemia        Past Surgical History:   Procedure Laterality Date    CARDIAC CATHETERIZATION N/A 8/2/2024    Procedure: Left Heart Cath possible PCI;  Surgeon: Wilmer John MD;  Location: [facility] CATH INVASIVE LOCATION;  Service: Cardiovascular;  Laterality: N/A;    CORONARY ARTERY BYPASS GRAFT N/A 8/8/2024    Procedure: CORONARY ARTERY BYPASS GRAFTING;  Surgeon: Salvatore Domingo MD;  Location: [facility] CVOR;  Service: Cardiothoracic;  Laterality: N/A;  Off Pump CABG x 3 (KIMO T-grafted to LIMA, LIMA sequenced)    TONSILLECTOMY         Family History   Problem Relation Age of Onset    Heart disease Father           Social History     Occupational History    Not on file   Tobacco Use    Smoking status: Never    Smokeless tobacco: Never   Vaping Use    Vaping status: Never Used   Substance and Sexual Activity    Alcohol use: Yes     Comment: socially    Drug use: Never    Sexual activity: Defer      Review of Systems   Cardiovascular:  Negative for chest pain.   Musculoskeletal:  Positive for arthralgias.       Objective:    Pulse 63   Ht 175.3 cm (69\")   Wt 106 kg (233 lb 12.8 oz)   BMI 34.53 kg/m²     Physical Examination:  Right shoulder has intact skin mild pain over the bicep groove passive elevation 150 abduction 120 external rotation 20 internal rotation S1 versus " L3 on the left he has mild pain but no weakness on [place] and supraspinatus testing.  Belly press and liftoff are 5/5.  Sensory and motor exam are intact all distributions. Radial pulse is palpable and capillary refill is less than two seconds to all digits.    Imaging:  right Shoulder X-Ray  Indication: shoulder pain  AP Y and Lateral views  Findings: well maintained joint spaces no significant impingement  no bony lesion  Soft tissues normal  normal joint spaces  Hardware appropriately positioned not applicable      no prior studies available for comparison  MRI of the shoulder demonstrates partial supraspinatus tear degenerative labral tearing with significant capsulitis  Assessment:  Frozen shoulder right side    Plan:    Options discussed I recommend continued conservative treatment, I recommend injection after today's evaluation. Risks and benefits of the injection were discussed. Under sterile technique and after timeout and verbal consent I injected 80 mg of Kenalog and 2 mL of 1% Lidocaine in the shoulder and subacromial space. It was well tolerated.  Begin therapy see me in 2 months    Procedures         Disclaimer: Part of this note may be an electronic transcription/translation of spoken language to printed text using the Dragon Dictation System

## 2025-06-18 ENCOUNTER — RESULTS FOLLOW-UP (OUTPATIENT)
Dept: CARDIOLOGY | Facility: CLINIC | Age: 55
End: 2025-06-18
Payer: COMMERCIAL

## 2025-06-18 DIAGNOSIS — E78.2 MIXED HYPERLIPIDEMIA: Primary | ICD-10-CM

## 2025-06-18 NOTE — TELEPHONE ENCOUNTER
Called and LM for the patient to call the office back. About lab results commented below. I will also be sending a message to the patient's MyChart.     ----- Message from Wilmer John sent at 6/16/2025  9:32 AM EDT -----  Labs look okay except for elevated lipids and low vitamin D  Follow-up with primary care physician for low vitamin D  Advised to increase the dose of Lipitor from 40 mg to 80 mg p.o. once a day  Recheck lipids in 3 months  ----- Message -----  From: Rachelle, Jenna Incoming  Sent: 6/16/2025   8:12 AM EDT  To: Wilmer John MD

## 2025-06-18 NOTE — TELEPHONE ENCOUNTER
----- Message from Wilmer John sent at 6/16/2025  9:32 AM EDT -----  Labs look okay except for elevated lipids and low vitamin D  Follow-up with primary care physician for low vitamin D  Advised to increase the dose of Lipitor from 40 mg to 80 mg p.o. once a day  Recheck lipids in 3 months  ----- Message -----  From: Jenna Bush Incoming  Sent: 6/16/2025   8:12 AM EDT  To: Wilmer John MD

## 2025-08-26 ENCOUNTER — HOSPITAL ENCOUNTER (OUTPATIENT)
Dept: CT IMAGING | Facility: HOSPITAL | Age: 55
Discharge: HOME | End: 2025-08-26
Admitting: NURSE PRACTITIONER
Payer: COMMERCIAL

## 2025-08-26 DIAGNOSIS — I71.21 ANEURYSM OF ASCENDING AORTA WITHOUT RUPTURE: ICD-10-CM

## 2025-08-26 PROCEDURE — 71250 CT THORAX DX C-: CPT

## (undated) DEVICE — DGW .035 FC J3MM 260CM TEF: Brand: EMERALD

## (undated) DEVICE — CATH DIAG IMPULSE FR4 6F 100CM

## (undated) DEVICE — TR BAND RADIAL ARTERY COMPRESSION DEVICE: Brand: TR BAND

## (undated) DEVICE — PK TRY HEART CATH 50

## (undated) DEVICE — GLIDESHEATH SLENDER ACCESS KIT: Brand: GLIDESHEATH SLENDER

## (undated) DEVICE — CATH DIAG IMPULSE FL3.5 6F 100CM